# Patient Record
Sex: FEMALE | Race: WHITE | NOT HISPANIC OR LATINO | Employment: FULL TIME | URBAN - NONMETROPOLITAN AREA
[De-identification: names, ages, dates, MRNs, and addresses within clinical notes are randomized per-mention and may not be internally consistent; named-entity substitution may affect disease eponyms.]

---

## 2017-04-07 ENCOUNTER — OFFICE VISIT (OUTPATIENT)
Dept: FAMILY MEDICINE | Facility: OTHER | Age: 32
End: 2017-04-07
Attending: FAMILY MEDICINE
Payer: COMMERCIAL

## 2017-04-07 VITALS
BODY MASS INDEX: 23.64 KG/M2 | HEART RATE: 66 BPM | HEIGHT: 68 IN | TEMPERATURE: 96.3 F | SYSTOLIC BLOOD PRESSURE: 121 MMHG | DIASTOLIC BLOOD PRESSURE: 70 MMHG | OXYGEN SATURATION: 99 % | RESPIRATION RATE: 17 BRPM | WEIGHT: 156 LBS

## 2017-04-07 DIAGNOSIS — N92.6 IRREGULAR MENSTRUAL CYCLE: ICD-10-CM

## 2017-04-07 DIAGNOSIS — Z23 NEED FOR VACCINATION: Primary | ICD-10-CM

## 2017-04-07 PROCEDURE — 90715 TDAP VACCINE 7 YRS/> IM: CPT | Performed by: FAMILY MEDICINE

## 2017-04-07 PROCEDURE — 99202 OFFICE O/P NEW SF 15 MIN: CPT | Mod: 25 | Performed by: FAMILY MEDICINE

## 2017-04-07 PROCEDURE — 90471 IMMUNIZATION ADMIN: CPT | Performed by: FAMILY MEDICINE

## 2017-04-07 ASSESSMENT — ANXIETY QUESTIONNAIRES
2. NOT BEING ABLE TO STOP OR CONTROL WORRYING: NOT AT ALL
6. BECOMING EASILY ANNOYED OR IRRITABLE: NOT AT ALL
5. BEING SO RESTLESS THAT IT IS HARD TO SIT STILL: NOT AT ALL
1. FEELING NERVOUS, ANXIOUS, OR ON EDGE: NOT AT ALL
3. WORRYING TOO MUCH ABOUT DIFFERENT THINGS: NOT AT ALL
GAD7 TOTAL SCORE: 0
7. FEELING AFRAID AS IF SOMETHING AWFUL MIGHT HAPPEN: NOT AT ALL
IF YOU CHECKED OFF ANY PROBLEMS ON THIS QUESTIONNAIRE, HOW DIFFICULT HAVE THESE PROBLEMS MADE IT FOR YOU TO DO YOUR WORK, TAKE CARE OF THINGS AT HOME, OR GET ALONG WITH OTHER PEOPLE: NOT DIFFICULT AT ALL

## 2017-04-07 ASSESSMENT — PAIN SCALES - GENERAL: PAINLEVEL: NO PAIN (0)

## 2017-04-07 ASSESSMENT — PATIENT HEALTH QUESTIONNAIRE - PHQ9: 5. POOR APPETITE OR OVEREATING: NOT AT ALL

## 2017-04-07 NOTE — PROGRESS NOTES
"  SUBJECTIVE:                                                    Viki Busch is a 31 year old female who presents to clinic today for the following health issues:        Vaginal Bleeding (Dysmenorrhea)      Onset: tracking x 1 year, worse last 3 years, after getting off birth control     Description:  Duration of bleeding episodes: 5-6 days of spotting leading up to cycle, 1 week   Frequency between periods:  27-28 cycle   Describe bleeding/flow:   Clots: YES-   Number of pads/hour: 2, for first 2 days, and lightens after that   Cramping: during- first 2 days     Intensity:  mild    Accompanying signs and symptoms:     History (similar episodes/previous evaluation): None    Precipitating or alleviating factors: None    Therapies tried and outcome: None       Problem list and histories reviewed & adjusted, as indicated.  Additional history: as documented    There is no problem list on file for this patient.    Past Surgical History:   Procedure Laterality Date     HC TOOTH EXTRACTION W/FORCEP Bilateral 06/01/2016       Social History   Substance Use Topics     Smoking status: Former Smoker     Packs/day: 0.20     Years: 6.00     Types: Cigarettes     Smokeless tobacco: Not on file      Comment: quit 2010     Alcohol use Yes      Comment: 2-3x/month     Family History   Problem Relation Age of Onset     Thyroid Cancer Mother      Hyperlipidemia Father      Other - See Comments Maternal Grandmother      depression     Other - See Comments Maternal Grandfather      old age     Other - See Comments Paternal Grandmother      old age     Other - See Comments Paternal Grandfather      old age         No current outpatient prescriptions on file.     No Known Allergies    ROS:  Constitutional, HEENT, cardiovascular, pulmonary, gi and gu systems are negative, except as otherwise noted.    OBJECTIVE:                                                    /70  Pulse 66  Temp 96.3  F (35.7  C)  Resp 17  Ht 5' 8\" (1.727 " m)  Wt 156 lb (70.8 kg)  LMP 04/07/2017 (Exact Date)  SpO2 99%  Breastfeeding? No  BMI 23.72 kg/m2  Body mass index is 23.72 kg/(m^2).  GENERAL APPEARANCE: healthy, alert and no distress  RESP: lungs clear to auscultation - no rales, rhonchi or wheezes  CV: regular rates and rhythm, normal S1 S2, no S3 or S4 and no murmur, click or rub  SKIN: acne  PSYCH: mentation appears normal and affect normal/bright       ASSESSMENT/PLAN:                                                    1. Need for vaccination    - 1st  Administration  [76857]  - TDAP VACCINE (ADACEL) [79548.002]    2. Irregular menstrual cycle  F/u 10 days for results and will likely schedule THP  - Lutropin; Future  - Follicle stimulating hormone; Future  - Testosterone Free and Total; Future  - DHEA sulfate; Future  - Androstenedione; Future  - Prolactin; Future  - TSH with free T4 reflex; Future  - Insulin level; Future  - US Pelvis Complete without Transvaginal; Future  - US Transvaginal Non OB; Future  - US Pelvis Complete without Transvaginal  - US Transvaginal Non OB    3.  Establish care -- will obtain records from [facility] for gerardo crawford    Patient was agreeable to this plan and had no further questions.  See Patient Instructions    Ludy Reza MD  [facility]

## 2017-04-07 NOTE — NURSING NOTE
"Chief Complaint   Patient presents with     Establish Care     Abnormal Bleeding Problem       Initial /70  Pulse 66  Temp 96.3  F (35.7  C)  Resp 17  Ht 5' 8\" (1.727 m)  Wt 156 lb (70.8 kg)  LMP 04/07/2017 (Exact Date)  SpO2 99%  Breastfeeding? No  BMI 23.72 kg/m2 Estimated body mass index is 23.72 kg/(m^2) as calculated from the following:    Height as of this encounter: 5' 8\" (1.727 m).    Weight as of this encounter: 156 lb (70.8 kg).  Medication Reconciliation: complete  "

## 2017-04-07 NOTE — MR AVS SNAPSHOT
After Visit Summary   4/7/2017    Viki Busch    MRN: [id number]           Patient Information     Date Of Birth          1985        Visit Information        Provider Department      4/7/2017 10:30 AM Ludy Re[facility] MD [facility]        Today's Diagnoses     Need for vaccination    -  1    Irregular menstrual cycle          Care Instructions    1.  optivite PMT  1 tablet 2x/day  2.  Fish oil 2000mg daily  Nordic naturals is most pure  3.  Probiotic or kefir daily  4oz/day  Or kombucha        Follow-ups after your visit        Your next 10 appointments already scheduled     Apr 19, 2017 11:00 AM CDT   (Arrive by 10:45 AM)   SHORT with Ludy Reza MD   [facility] [place] ([facility] [place] [facility])    [address]  [place] [address]   [phone]              Future tests that were ordered for you today     Open Future Orders        Priority Expected Expires Ordered    Lutropin Routine 4/9/2017 4/6/2018 4/7/2017    Follicle stimulating hormone Routine 4/9/2017 4/6/2018 4/7/2017    Testosterone Free and Total Routine 4/9/2017 4/6/2018 4/7/2017    DHEA sulfate Routine 4/9/2017 4/6/2018 4/7/2017    Androstenedione Routine 4/9/2017 4/6/2018 4/7/2017    Prolactin Routine 4/9/2017 4/6/2018 4/7/2017    TSH with free T4 reflex Routine 4/9/2017 4/6/2018 4/7/2017    Insulin level Routine 4/9/2017 4/6/2018 4/7/2017            Who to contact     If you have questions or need follow up information about today's clinic visit or your schedule please contact [facility] directly at [phone].  Normal or non-critical lab and imaging results will be communicated to you by MyChart, letter or phone within 4 business days after the clinic has received the results. If you do not hear from us within 7 days, please contact the clinic through MyChart or phone. If you have a critical or abnormal lab result, we will notify you by phone as soon as possible.  Submit refill  "requests through GroupStream or call your pharmacy and they will forward the refill request to us. Please allow 3 business days for your refill to be completed.          Additional Information About Your Visit        oragenicshart Information     GroupStream gives you secure access to your electronic health record. If you see a primary care provider, you can also send messages to your care team and make appointments. If you have questions, please call your primary care clinic.  If you do not have a primary care provider, please call [phone] and they will assist you.        Care EveryWhere ID     This is your Care EveryWhere ID. This could be used by other organizations to access your [place] medical records  [id number]        Your Vitals Were     Pulse Temperature Respirations Height Last Period Pulse Oximetry    66 96.3  F (35.7  C) 17 5' 8\" (1.727 m) 04/07/2017 (Exact Date) 99%    Breastfeeding? BMI (Body Mass Index)                No 23.72 kg/m2           Blood Pressure from Last 3 Encounters:   04/07/17 121/70    Weight from Last 3 Encounters:   04/07/17 156 lb (70.8 kg)              We Performed the Following     1st  Administration  [98576]     TDAP VACCINE (ADACEL) [16004.002]     US Pelvis Complete without Transvaginal     US Transvaginal Non OB        Primary Care Provider    None Specified       No primary provider on file.        Thank you!     [facility]  [facility]. Our goal is always to provide you with excellent care. Hearing back from our patients is one way we can continue to improve our services. Please take a few minutes to complete the written survey that you may receive in the mail after your visit with us. Thank you!             Your Updated Medication List - Protect others around you: Learn how to safely use, store and throw away your medicines at www.disposemymeds.org.      Notice  As of 4/7/2017 11:16 AM    You have not been prescribed any medications.      "

## 2017-04-07 NOTE — PATIENT INSTRUCTIONS
1.  optivite PMT  1 tablet 2x/day  2.  Fish oil 2000mg daily  Nordic naturals is most pure  3.  Probiotic or kefir daily  4oz/day  Or kombucha

## 2017-04-08 ASSESSMENT — PATIENT HEALTH QUESTIONNAIRE - PHQ9: SUM OF ALL RESPONSES TO PHQ QUESTIONS 1-9: 3

## 2017-04-08 ASSESSMENT — ANXIETY QUESTIONNAIRES: GAD7 TOTAL SCORE: 0

## 2017-04-09 DIAGNOSIS — N92.6 IRREGULAR MENSTRUAL CYCLE: ICD-10-CM

## 2017-04-09 LAB — TSH SERPL DL<=0.005 MIU/L-ACNC: 3.61 MU/L (ref 0.4–4)

## 2017-04-09 PROCEDURE — 82627 DEHYDROEPIANDROSTERONE: CPT | Performed by: FAMILY MEDICINE

## 2017-04-09 PROCEDURE — 84443 ASSAY THYROID STIM HORMONE: CPT | Performed by: FAMILY MEDICINE

## 2017-04-09 PROCEDURE — 84270 ASSAY OF SEX HORMONE GLOBUL: CPT | Performed by: FAMILY MEDICINE

## 2017-04-09 PROCEDURE — 83002 ASSAY OF GONADOTROPIN (LH): CPT | Performed by: FAMILY MEDICINE

## 2017-04-09 PROCEDURE — 82157 ASSAY OF ANDROSTENEDIONE: CPT | Performed by: FAMILY MEDICINE

## 2017-04-09 PROCEDURE — 83525 ASSAY OF INSULIN: CPT | Performed by: FAMILY MEDICINE

## 2017-04-09 PROCEDURE — 83001 ASSAY OF GONADOTROPIN (FSH): CPT | Performed by: FAMILY MEDICINE

## 2017-04-09 PROCEDURE — 36415 COLL VENOUS BLD VENIPUNCTURE: CPT | Performed by: FAMILY MEDICINE

## 2017-04-09 PROCEDURE — 84403 ASSAY OF TOTAL TESTOSTERONE: CPT | Performed by: FAMILY MEDICINE

## 2017-04-09 PROCEDURE — 84146 ASSAY OF PROLACTIN: CPT | Performed by: FAMILY MEDICINE

## 2017-04-10 LAB
FSH SERPL-ACNC: 4.5 IU/L
INSULIN SERPL-ACNC: 10.7 MU/L (ref 3–25)
LH SERPL-ACNC: 8 IU/L
PROLACTIN SERPL-MCNC: 18 UG/L (ref 3–27)

## 2017-04-11 LAB — DHEA-S SERPL-MCNC: 83 UG/DL (ref 35–430)

## 2017-04-13 LAB — ANDROST SERPL-MCNC: 0.91 NG/ML

## 2017-04-14 LAB
SHBG SERPL-SCNC: 117 NMOL/L (ref 30–135)
TESTOST FREE SERPL-MCNC: 0.21 NG/DL (ref 0.13–0.92)
TESTOST SERPL-MCNC: 34 NG/DL (ref 8–60)

## 2017-04-17 ENCOUNTER — HOSPITAL ENCOUNTER (OUTPATIENT)
Dept: ULTRASOUND IMAGING | Facility: HOSPITAL | Age: 32
Discharge: HOME | End: 2017-04-17
Attending: FAMILY MEDICINE | Admitting: FAMILY MEDICINE
Payer: COMMERCIAL

## 2017-04-17 PROCEDURE — 76856 US EXAM PELVIC COMPLETE: CPT | Mod: TC

## 2017-04-17 PROCEDURE — 76830 TRANSVAGINAL US NON-OB: CPT | Mod: TC

## 2017-04-19 ENCOUNTER — OFFICE VISIT (OUTPATIENT)
Dept: FAMILY MEDICINE | Facility: OTHER | Age: 32
End: 2017-04-19
Attending: FAMILY MEDICINE
Payer: COMMERCIAL

## 2017-04-19 VITALS
HEART RATE: 64 BPM | DIASTOLIC BLOOD PRESSURE: 63 MMHG | WEIGHT: 151 LBS | BODY MASS INDEX: 22.96 KG/M2 | OXYGEN SATURATION: 100 % | TEMPERATURE: 97.3 F | SYSTOLIC BLOOD PRESSURE: 108 MMHG | RESPIRATION RATE: 17 BRPM

## 2017-04-19 DIAGNOSIS — E28.2 PCOS (POLYCYSTIC OVARIAN SYNDROME): Primary | ICD-10-CM

## 2017-04-19 DIAGNOSIS — N94.3 PREMENSTRUAL SYNDROME: ICD-10-CM

## 2017-04-19 PROCEDURE — 99214 OFFICE O/P EST MOD 30 MIN: CPT | Performed by: FAMILY MEDICINE

## 2017-04-19 ASSESSMENT — PAIN SCALES - GENERAL: PAINLEVEL: NO PAIN (0)

## 2017-04-19 NOTE — MR AVS SNAPSHOT
After Visit Summary   4/19/2017    Viki Busch    MRN: [id number]           Patient Information     Date Of Birth          1985        Visit Information        Provider Department      4/19/2017 11:00 AM Ludy Reza MD [facility]        Care Instructions    1.  ashwaghanda  (jessica)  1 capsule   2.  N acetyl cysteine -- 1 tablet (500-600mg)  2x/day        Follow-ups after your visit        Who to contact     If you have questions or need follow up information about today's clinic visit or your schedule please contact [facility] directly at [phone].  Normal or non-critical lab and imaging results will be communicated to you by MyChart, letter or phone within 4 business days after the clinic has received the results. If you do not hear from us within 7 days, please contact the clinic through Rootlesshart or phone. If you have a critical or abnormal lab result, we will notify you by phone as soon as possible.  Submit refill requests through Rentabilities or call your pharmacy and they will forward the refill request to us. Please allow 3 business days for your refill to be completed.          Additional Information About Your Visit        MyChart Information     Rentabilities gives you secure access to your electronic health record. If you see a primary care provider, you can also send messages to your care team and make appointments. If you have questions, please call your primary care clinic.  If you do not have a primary care provider, please call [phone] and they will assist you.        Care EveryWhere ID     This is your Care EveryWhere ID. This could be used by other organizations to access your [place] medical records  [id number]        Your Vitals Were     Pulse Temperature Respirations Last Period Pulse Oximetry BMI (Body Mass Index)    64 97.3  F (36.3  C) 17 04/07/2017 (Exact Date) 100% 22.96 kg/m2       Blood Pressure from Last 3 Encounters:   04/19/17  108/63   04/07/17 121/70    Weight from Last 3 Encounters:   04/19/17 151 lb (68.5 kg)   04/07/17 156 lb (70.8 kg)              Today, you had the following     No orders found for display       Primary Care Provider Office Phone # Fax #    Ludy Reza [phone]       [facility] [place] [address] [place], [place] [address] [address]        Thank you!     Thank you for choosing [facility]  for your care. Our goal is always to provide you with excellent care. Hearing back from our patients is one way we can continue to improve our services. Please take a few minutes to complete the written survey that you may receive in the mail after your visit with us. Thank you!             Your Updated Medication List - Protect others around you: Learn how to safely use, store and throw away your medicines at www.disposemymeds.org.      Notice  As of 4/19/2017 11:30 AM    You have not been prescribed any medications.

## 2017-04-19 NOTE — PROGRESS NOTES
SUBJECTIVE:                                                    Viki Busch is a 31 year old female who presents to clinic today for the following health issues:        dysmenorrhea       Duration: 10 day follow up     Description (location/character/radiation): n/a    Intensity:  mild    Accompanying signs and symptoms:     History (similar episodes/previous evaluation): None    Precipitating or alleviating factors: None    Therapies tried and outcome: None     Has PMS and pre menstrual spotting  Did u/s and labs recently    Problem list and histories reviewed & adjusted, as indicated.  Additional history: as documented    Current Outpatient Prescriptions   Medication Sig Dispense Refill     acetylcysteine (N-ACETYL CYSTEINE) 600 MG CAPS capsule Take 1 capsule (600 mg) by mouth 2 times daily 60 capsule 1     Labs reviewed in EPIC    ROS:  Constitutional, HEENT, cardiovascular, pulmonary, gi and gu systems are negative, except as otherwise noted.    OBJECTIVE:                                                    /63  Pulse 64  Temp 97.3  F (36.3  C)  Resp 17  Wt 151 lb (68.5 kg)  LMP 04/07/2017 (Exact Date)  SpO2 100%  BMI 22.96 kg/m2  Body mass index is 22.96 kg/(m^2).  GENERAL APPEARANCE: healthy, alert and no distress  RESP: lungs clear to auscultation - no rales, rhonchi or wheezes  CV: regular rates and rhythm, normal S1 S2, no S3 or S4 and no murmur, click or rub  PSYCH: mentation appears normal and affect normal/bright       ASSESSMENT/PLAN:                                                    1. PCOS (polycystic ovarian syndrome)  F/u 1 week after THP  Also recommend ashwaanda for sub hypothyroidism  - acetylcysteine (N-ACETYL CYSTEINE) 600 MG CAPS capsule; Take 1 capsule (600 mg) by mouth 2 times daily  Dispense: 60 capsule; Refill: 1    2. Premenstrual syndrome  THP in next 1-2 weeks and f/u 1 week after  May need prometrium supplementation.    Patient was agreeable to this plan and had no  further questions.  See Patient Instructions    Ludy Reza MD  [facility]

## 2017-04-19 NOTE — NURSING NOTE
"Chief Complaint   Patient presents with     Abnormal Bleeding Problem       Initial /63  Pulse 64  Temp 97.3  F (36.3  C)  Resp 17  Wt 151 lb (68.5 kg)  LMP 04/07/2017 (Exact Date)  SpO2 100%  BMI 22.96 kg/m2 Estimated body mass index is 22.96 kg/(m^2) as calculated from the following:    Height as of 4/7/17: 5' 8\" (1.727 m).    Weight as of this encounter: 151 lb (68.5 kg).  Medication Reconciliation: complete  "

## 2017-04-25 ENCOUNTER — TELEPHONE (OUTPATIENT)
Dept: FAMILY MEDICINE | Facility: OTHER | Age: 32
End: 2017-04-25
Payer: COMMERCIAL

## 2017-04-25 ENCOUNTER — APPOINTMENT (OUTPATIENT)
Dept: LAB | Facility: OTHER | Age: 32
End: 2017-04-25
Attending: FAMILY MEDICINE
Payer: COMMERCIAL

## 2017-04-25 DIAGNOSIS — E28.2 PCOS (POLYCYSTIC OVARIAN SYNDROME): Primary | ICD-10-CM

## 2017-04-25 DIAGNOSIS — N94.3 PREMENSTRUAL SYNDROME: ICD-10-CM

## 2017-04-25 LAB — ESTRADIOL SERPL-MCNC: 100 PG/ML

## 2017-04-25 PROCEDURE — 36415 COLL VENOUS BLD VENIPUNCTURE: CPT | Performed by: FAMILY MEDICINE

## 2017-04-25 PROCEDURE — 82670 ASSAY OF TOTAL ESTRADIOL: CPT | Mod: 90 | Performed by: FAMILY MEDICINE

## 2017-04-25 PROCEDURE — 99000 SPECIMEN HANDLING OFFICE-LAB: CPT | Performed by: FAMILY MEDICINE

## 2017-04-25 PROCEDURE — 84144 ASSAY OF PROGESTERONE: CPT | Mod: 90 | Performed by: FAMILY MEDICINE

## 2017-04-26 LAB — PROGEST SERPL-MCNC: 9.8 NG/ML

## 2017-04-27 ENCOUNTER — MYC MEDICAL ADVICE (OUTPATIENT)
Dept: FAMILY MEDICINE | Facility: OTHER | Age: 32
End: 2017-04-27

## 2017-04-27 DIAGNOSIS — E28.2 PCOS (POLYCYSTIC OVARIAN SYNDROME): ICD-10-CM

## 2017-04-27 DIAGNOSIS — N94.3 PREMENSTRUAL SYNDROME: ICD-10-CM

## 2017-04-27 DIAGNOSIS — E28.2 PCOS (POLYCYSTIC OVARIAN SYNDROME): Primary | ICD-10-CM

## 2017-04-27 LAB
ESTRADIOL SERPL-MCNC: 133 PG/ML
PROGEST SERPL-MCNC: 12.7 NG/ML

## 2017-04-27 PROCEDURE — 84144 ASSAY OF PROGESTERONE: CPT | Mod: 90 | Performed by: FAMILY MEDICINE

## 2017-04-27 PROCEDURE — 82670 ASSAY OF TOTAL ESTRADIOL: CPT | Mod: 90 | Performed by: FAMILY MEDICINE

## 2017-04-27 PROCEDURE — 36415 COLL VENOUS BLD VENIPUNCTURE: CPT | Performed by: FAMILY MEDICINE

## 2017-04-27 PROCEDURE — 99000 SPECIMEN HANDLING OFFICE-LAB: CPT | Performed by: FAMILY MEDICINE

## 2017-04-29 DIAGNOSIS — N94.3 PREMENSTRUAL SYNDROME: ICD-10-CM

## 2017-04-29 DIAGNOSIS — E28.2 PCOS (POLYCYSTIC OVARIAN SYNDROME): ICD-10-CM

## 2017-04-29 PROCEDURE — 82670 ASSAY OF TOTAL ESTRADIOL: CPT | Performed by: FAMILY MEDICINE

## 2017-04-29 PROCEDURE — 84144 ASSAY OF PROGESTERONE: CPT | Performed by: FAMILY MEDICINE

## 2017-04-29 PROCEDURE — 36415 COLL VENOUS BLD VENIPUNCTURE: CPT | Performed by: FAMILY MEDICINE

## 2017-05-01 DIAGNOSIS — E28.2 PCOS (POLYCYSTIC OVARIAN SYNDROME): ICD-10-CM

## 2017-05-01 DIAGNOSIS — N94.3 PREMENSTRUAL SYNDROME: ICD-10-CM

## 2017-05-01 LAB
ESTRADIOL SERPL-MCNC: 100 PG/ML
ESTRADIOL SERPL-MCNC: 111 PG/ML
PROGEST SERPL-MCNC: 11.5 NG/ML
PROGEST SERPL-MCNC: 8.4 NG/ML

## 2017-05-01 PROCEDURE — 82670 ASSAY OF TOTAL ESTRADIOL: CPT | Mod: 90 | Performed by: FAMILY MEDICINE

## 2017-05-01 PROCEDURE — 36415 COLL VENOUS BLD VENIPUNCTURE: CPT | Performed by: FAMILY MEDICINE

## 2017-05-01 PROCEDURE — 99000 SPECIMEN HANDLING OFFICE-LAB: CPT | Performed by: FAMILY MEDICINE

## 2017-05-01 PROCEDURE — 84144 ASSAY OF PROGESTERONE: CPT | Mod: 90 | Performed by: FAMILY MEDICINE

## 2017-05-03 DIAGNOSIS — N94.3 PREMENSTRUAL SYNDROME: ICD-10-CM

## 2017-05-03 DIAGNOSIS — E28.2 PCOS (POLYCYSTIC OVARIAN SYNDROME): ICD-10-CM

## 2017-05-03 LAB
ESTRADIOL SERPL-MCNC: 100 PG/ML
PROGEST SERPL-MCNC: 6 NG/ML

## 2017-05-03 PROCEDURE — 82670 ASSAY OF TOTAL ESTRADIOL: CPT | Mod: 90 | Performed by: FAMILY MEDICINE

## 2017-05-03 PROCEDURE — 84144 ASSAY OF PROGESTERONE: CPT | Mod: 90 | Performed by: FAMILY MEDICINE

## 2017-05-03 PROCEDURE — 36415 COLL VENOUS BLD VENIPUNCTURE: CPT | Performed by: FAMILY MEDICINE

## 2017-05-03 PROCEDURE — 99000 SPECIMEN HANDLING OFFICE-LAB: CPT | Performed by: FAMILY MEDICINE

## 2017-05-10 ENCOUNTER — OFFICE VISIT (OUTPATIENT)
Dept: FAMILY MEDICINE | Facility: OTHER | Age: 32
End: 2017-05-10
Attending: FAMILY MEDICINE
Payer: COMMERCIAL

## 2017-05-10 VITALS
HEART RATE: 62 BPM | DIASTOLIC BLOOD PRESSURE: 70 MMHG | RESPIRATION RATE: 17 BRPM | BODY MASS INDEX: 22.81 KG/M2 | TEMPERATURE: 96.3 F | OXYGEN SATURATION: 100 % | WEIGHT: 150 LBS | SYSTOLIC BLOOD PRESSURE: 116 MMHG

## 2017-05-10 DIAGNOSIS — R79.89 LOW SERUM PROGESTERONE: ICD-10-CM

## 2017-05-10 DIAGNOSIS — N94.3 PMS (PREMENSTRUAL SYNDROME): Primary | ICD-10-CM

## 2017-05-10 PROCEDURE — 99213 OFFICE O/P EST LOW 20 MIN: CPT | Performed by: FAMILY MEDICINE

## 2017-05-10 RX ORDER — EVE PRIMROSE/LINOLEIC/G-LENIC 1000 MG
0.5 CAPSULE ORAL 2 TIMES DAILY
COMMUNITY
End: 2017-07-07

## 2017-05-10 RX ORDER — CHLORAL HYDRATE 500 MG
2 CAPSULE ORAL 2 TIMES DAILY
COMMUNITY
End: 2019-08-05

## 2017-05-10 ASSESSMENT — PAIN SCALES - GENERAL: PAINLEVEL: NO PAIN (0)

## 2017-05-10 NOTE — PROGRESS NOTES
SUBJECTIVE:                                                    Viki Busch is a 31 year old female who presents to clinic today for the following health issues:        PCOS       Duration: 3-4 week follow up     Description (location/character/radiation): n/a     Intensity:  mild    Accompanying signs and symptoms:     History (similar episodes/previous evaluation): None    Precipitating or alleviating factors: NAC    Therapies tried and outcome: NAC     Had THP done    Problem list and histories reviewed & adjusted, as indicated.  Additional history: as documented    Current Outpatient Prescriptions   Medication Sig Dispense Refill     Multiple Vitamins-Minerals (OPTIVITE PO) Take 1 tablet by mouth 2 times daily       fish oil-omega-3 fatty acids 1000 MG capsule Take 2 g by mouth 2 times daily       Ashwagandha 125 MG CAPS Take 1 tablet by mouth daily       Evening Primrose Oil 1000 MG CAPS Take 0.5 tablets by mouth 2 times daily       progesterone (PROMETRIUM) 200 MG capsule Take 1 capsule (200 mg) by mouth At Bedtime for 10 days 30 capsule 1     acetylcysteine (N-ACETYL CYSTEINE) 600 MG CAPS capsule Take 1 capsule (600 mg) by mouth 2 times daily 60 capsule 1     Labs reviewed in EPIC    ROS:  Constitutional, HEENT, cardiovascular, pulmonary, gi and gu systems are negative, except as otherwise noted.    OBJECTIVE:                                                    /70  Pulse 62  Temp 96.3  F (35.7  C)  Resp 17  Wt 150 lb (68 kg)  SpO2 100%  BMI 22.81 kg/m2  Body mass index is 22.81 kg/(m^2).  GENERAL APPEARANCE: healthy, alert and no distress  PSYCH: mentation appears normal and affect normal/bright       ASSESSMENT/PLAN:                                                    1. PMS (premenstrual syndrome)  Start prometrium and recheck peak +7 labs in 2 mos  - progesterone (PROMETRIUM) 200 MG capsule; Take 1 capsule (200 mg) by mouth At Bedtime for 10 days  Dispense: 30 capsule; Refill: 1  -  Estradiol; Standing  - Progesterone; Standing    2. Low serum progesterone    - Estradiol; Standing  - Progesterone; Standing    Patient was agreeable to this plan and had no further questions.  See Patient Instructions    Ludy Reza MD  [facility]

## 2017-05-10 NOTE — NURSING NOTE
"No chief complaint on file.      Initial /70  Pulse 62  Temp 96.3  F (35.7  C)  Resp 17  Wt 150 lb (68 kg)  SpO2 100%  BMI 22.81 kg/m2 Estimated body mass index is 22.81 kg/(m^2) as calculated from the following:    Height as of 4/7/17: 5' 8\" (1.727 m).    Weight as of this encounter: 150 lb (68 kg).  Medication Reconciliation: complete  "

## 2017-05-10 NOTE — MR AVS SNAPSHOT
After Visit Summary   5/10/2017    Viki Busch    MRN: [id number]           Patient Information     Date Of Birth          1985        Visit Information        Provider Department      5/10/2017 9:45 AM [facility]        Today's Diagnoses     PMS (premenstrual syndrome)    -  1    Low serum progesterone           Follow-ups after your visit        Future tests that were ordered for you today     Open Standing Orders        Priority Remaining Interval Expires Ordered    Estradiol Routine 5/5  5/9/2018 5/10/2017    Progesterone Routine 5/5  5/9/2018 5/10/2017            Who to contact     If you have questions or need follow up information about today's clinic visit or your schedule please [facility] at [phone].  Normal or non-critical lab and imaging results will be communicated to you by MyChart, letter or phone within 4 business days after the clinic has received the results. If you do not hear from us within 7 days, please contact the clinic through MyChart or phone. If you have a critical or abnormal lab result, we will notify you by phone as soon as possible.  Submit refill requests through Exie or call your pharmacy and they will forward the refill request to us. Please allow 3 business days for your refill to be completed.          Additional Information About Your Visit        MyChart Information     Exie gives you secure access to your electronic health record. If you see a primary care provider, you can also send messages to your care team and make appointments. If you have questions, please call your primary care clinic.  If you do not have a primary care provider, please call [phone] and they will assist you.        Care EveryWhere ID     This is your Care EveryWhere ID. This could be used by other organizations to access your [place] medical records  [id number]        Your Vitals Were     Pulse  Temperature Respirations Pulse Oximetry BMI (Body Mass Index)       62 96.3  F (35.7  C) 17 100% 22.81 kg/m2        Blood Pressure from Last 3 Encounters:   05/10/17 116/70   04/19/17 108/63   04/07/17 121/70    Weight from Last 3 Encounters:   05/10/17 150 lb (68 kg)   04/19/17 151 lb (68.5 kg)   04/07/17 156 lb (70.8 kg)                 Today's Medication Changes          These changes are accurate as of: 5/10/17 10:36 AM.  If you have any questions, ask your nurse or doctor.               Start taking these medicines.        Dose/Directions    progesterone 200 MG capsule   Commonly known as:  PROMETRIUM   Used for:  PMS (premenstrual syndrome)   Started by:  Ludy Reza MD        Dose:  200 mg   Take 1 capsule (200 mg) by mouth At Bedtime for 10 days   Quantity:  30 capsule   Refills:  1            Where to get your medicines      These medications were sent to [facility] [address] [place] [address] [place] - [address] AT [facility] [address], [place] [address]     Phone:  [phone]     progesterone 200 MG capsule                Primary Care Provider Office Phone # Fax #    Ludy Reza [phone]       [facility] [place] [address]  [place] [address]        Thank you!     Thank you for choosing [facility]  for your care. Our goal is always to provide you with excellent care. Hearing back from our patients is one way we can continue to improve our services. Please take a few minutes to complete the written survey that you may receive in the mail after your visit with us. Thank you!             Your Updated Medication List - Protect others around you: Learn how to safely use, store and throw away your medicines at www.disposemymeds.org.          This list is accurate as of: 5/10/17 10:36 AM.  Always use your most recent med list.                   Brand Name Dispense Instructions for use    acetylcysteine 600 MG Caps capsule    N-ACETYL  CYSTEINE    60 capsule    Take 1 capsule (600 mg) by mouth 2 times daily       Ashwagandha 125 MG Caps      Take 1 tablet by mouth daily       Evening Primrose Oil 1000 MG Caps      Take 0.5 tablets by mouth 2 times daily       fish oil-omega-3 fatty acids 1000 MG capsule      Take 2 g by mouth 2 times daily       OPTIVITE PO      Take 1 tablet by mouth 2 times daily       progesterone 200 MG capsule    PROMETRIUM    30 capsule    Take 1 capsule (200 mg) by mouth At Bedtime for 10 days

## 2017-06-03 ENCOUNTER — MYC MEDICAL ADVICE (OUTPATIENT)
Dept: FAMILY MEDICINE | Facility: OTHER | Age: 32
End: 2017-06-03

## 2017-06-29 ENCOUNTER — TELEPHONE (OUTPATIENT)
Dept: FAMILY MEDICINE | Facility: OTHER | Age: 32
End: 2017-06-29

## 2017-06-29 NOTE — TELEPHONE ENCOUNTER
10:30 AM    Reason for Call: Phone Call    Description: Patient is supposed to come in to do labs but it would have to be on 7-1-17, so should she go to the hospital lab or just wait until next month? If you could call back at your earliest convenience [phone]    Was an appointment offered for this call? No    Preferred method for responding to this message: Telephone Call    If we cannot reach you directly, may we leave a detailed response at the number you provided? Yes    Can this message wait until your PCP/provider returns, if available today? YES    Sumi Luna

## 2017-07-03 DIAGNOSIS — R79.89 LOW SERUM PROGESTERONE: ICD-10-CM

## 2017-07-03 DIAGNOSIS — N94.3 PMS (PREMENSTRUAL SYNDROME): ICD-10-CM

## 2017-07-03 LAB
ESTRADIOL SERPL-MCNC: 125 PG/ML
PROGEST SERPL-MCNC: 26.2 NG/ML

## 2017-07-03 PROCEDURE — 99000 SPECIMEN HANDLING OFFICE-LAB: CPT | Performed by: FAMILY MEDICINE

## 2017-07-03 PROCEDURE — 36415 COLL VENOUS BLD VENIPUNCTURE: CPT | Performed by: FAMILY MEDICINE

## 2017-07-03 PROCEDURE — 84144 ASSAY OF PROGESTERONE: CPT | Mod: 90 | Performed by: FAMILY MEDICINE

## 2017-07-03 PROCEDURE — 82670 ASSAY OF TOTAL ESTRADIOL: CPT | Mod: 90 | Performed by: FAMILY MEDICINE

## 2017-07-07 ENCOUNTER — OFFICE VISIT (OUTPATIENT)
Dept: FAMILY MEDICINE | Facility: OTHER | Age: 32
End: 2017-07-07
Attending: FAMILY MEDICINE
Payer: COMMERCIAL

## 2017-07-07 VITALS
RESPIRATION RATE: 16 BRPM | HEIGHT: 68 IN | WEIGHT: 154 LBS | DIASTOLIC BLOOD PRESSURE: 82 MMHG | SYSTOLIC BLOOD PRESSURE: 114 MMHG | OXYGEN SATURATION: 96 % | HEART RATE: 67 BPM | TEMPERATURE: 98.6 F | BODY MASS INDEX: 23.34 KG/M2

## 2017-07-07 DIAGNOSIS — N91.2 AMENORRHEA: ICD-10-CM

## 2017-07-07 DIAGNOSIS — N91.2 AMENORRHEA: Primary | ICD-10-CM

## 2017-07-07 DIAGNOSIS — R79.89 LOW SERUM PROGESTERONE: ICD-10-CM

## 2017-07-07 DIAGNOSIS — O26.90: ICD-10-CM

## 2017-07-07 DIAGNOSIS — Z34.80 SUPERVISION OF OTHER NORMAL PREGNANCY, ANTEPARTUM: ICD-10-CM

## 2017-07-07 LAB
HCG UR QL: POSITIVE
PROGEST SERPL-MCNC: 31.9 NG/ML

## 2017-07-07 PROCEDURE — 99000 SPECIMEN HANDLING OFFICE-LAB: CPT | Performed by: FAMILY MEDICINE

## 2017-07-07 PROCEDURE — 81025 URINE PREGNANCY TEST: CPT | Performed by: FAMILY MEDICINE

## 2017-07-07 PROCEDURE — 36415 COLL VENOUS BLD VENIPUNCTURE: CPT | Performed by: FAMILY MEDICINE

## 2017-07-07 PROCEDURE — 99213 OFFICE O/P EST LOW 20 MIN: CPT | Performed by: FAMILY MEDICINE

## 2017-07-07 PROCEDURE — 84144 ASSAY OF PROGESTERONE: CPT | Mod: 90 | Performed by: FAMILY MEDICINE

## 2017-07-07 ASSESSMENT — ANXIETY QUESTIONNAIRES
2. NOT BEING ABLE TO STOP OR CONTROL WORRYING: NOT AT ALL
GAD7 TOTAL SCORE: 1
4. TROUBLE RELAXING: NOT AT ALL
IF YOU CHECKED OFF ANY PROBLEMS ON THIS QUESTIONNAIRE, HOW DIFFICULT HAVE THESE PROBLEMS MADE IT FOR YOU TO DO YOUR WORK, TAKE CARE OF THINGS AT HOME, OR GET ALONG WITH OTHER PEOPLE: NOT DIFFICULT AT ALL
3. WORRYING TOO MUCH ABOUT DIFFERENT THINGS: NOT AT ALL
5. BEING SO RESTLESS THAT IT IS HARD TO SIT STILL: NOT AT ALL
7. FEELING AFRAID AS IF SOMETHING AWFUL MIGHT HAPPEN: NOT AT ALL
1. FEELING NERVOUS, ANXIOUS, OR ON EDGE: NOT AT ALL
6. BECOMING EASILY ANNOYED OR IRRITABLE: SEVERAL DAYS

## 2017-07-07 ASSESSMENT — PAIN SCALES - GENERAL: PAINLEVEL: NO PAIN (0)

## 2017-07-07 NOTE — NURSING NOTE
"Chief Complaint   Patient presents with     Prenatal Care     amenorrhea       Initial /82 (BP Location: Left arm, Patient Position: Sitting, Cuff Size: Adult Regular)  Pulse 67  Temp 98.6  F (37  C) (Tympanic)  Resp 16  Ht 5' 8\" (1.727 m)  Wt 154 lb (69.9 kg)  SpO2 96%  BMI 23.42 kg/m2 Estimated body mass index is 23.42 kg/(m^2) as calculated from the following:    Height as of this encounter: 5' 8\" (1.727 m).    Weight as of this encounter: 154 lb (69.9 kg).  Medication Reconciliation: complete   Effie Hendrickson      "

## 2017-07-07 NOTE — MR AVS SNAPSHOT
After Visit Summary   7/7/2017    Viki Busch    MRN: [id number]           Patient Information     Date Of Birth          1985        Visit Information        Provider Department      7/7/2017 9:30 AM Ludy Reza MD [facility]        Today's Diagnoses     Amenorrhea    -  1    Complication of pregnancy, unspecified trimester        Supervision of other normal pregnancy, antepartum        Low serum progesterone           Follow-ups after your visit        Your next 10 appointments already scheduled     Jul 19, 2017  9:45 AM CDT   (Arrive by 9:30 AM)   SHORT with Ludy Reza MD   [facility] ([facility] )    [address] [address]   [phone]            Jul 31, 2017  8:00 AM CDT   Radiology with [facility] ULTRASOUND 2   [facility] ([facility] )    [address] [facility] [address]   [phone]              Future tests that were ordered for you today     Open Standing Orders        Priority Remaining Interval Expires Ordered    Progesterone Routine 10/10  7/7/2018 7/7/2017            Who to contact     If you have questions or need follow up information about today's clinic visit or your schedule please contact [facility] directly at [phone].  Normal or non-critical lab and imaging results will be communicated to you by MyChart, letter or phone within 4 business days after the clinic has received the results. If you do not hear from us within 7 days, please contact the clinic through MyChart or phone. If you have a critical or abnormal lab result, we will notify you by phone as soon as possible.  Submit refill requests through [employer] or call your pharmacy and they will forward the refill request to us. Please allow 3 business days for your refill to be completed.          Additional Information About Your Visit        KydaemosharASCENDANT MDX Information     [employer] gives you secure access to  "your electronic health record. If you see a primary care provider, you can also send messages to your care team and make appointments. If you have questions, please call your primary care clinic.  If you do not have a primary care provider, please call [phone] and they will assist you.        Care EveryWhere ID     This is your Care EveryWhere ID. This could be used by other organizations to access your [place] medical records  [id number]        Your Vitals Were     Pulse Temperature Respirations Height Pulse Oximetry BMI (Body Mass Index)    67 98.6  F (37  C) (Tympanic) 16 5' 8\" (1.727 m) 96% 23.42 kg/m2       Blood Pressure from Last 3 Encounters:   07/07/17 114/82   05/10/17 116/70   04/19/17 108/63    Weight from Last 3 Encounters:   07/07/17 154 lb (69.9 kg)   05/10/17 150 lb (68 kg)   04/19/17 151 lb (68.5 kg)              We Performed the Following     US OB < 14 Weeks Single     US OB Transvaginal Only          Today's Medication Changes          These changes are accurate as of: 7/7/17  3:28 PM.  If you have any questions, ask your nurse or doctor.               Start taking these medicines.        Dose/Directions    PRENATAL FORMULA 27-1 MG Tabs   Used for:  Supervision of other normal pregnancy, antepartum   Started by:  Ludy Reza MD        Take 1 tablet daily   Quantity:  90 tablet   Refills:  3         These medicines have changed or have updated prescriptions.        Dose/Directions    progesterone 100 MG capsule   Commonly known as:  PROMETRIUM   This may have changed:    - how much to take  - when to take this  - additional instructions   Used for:  Supervision of other normal pregnancy, antepartum, Low serum progesterone   Changed by:  Ludy Reza MD        Dose:  100 mg   Take 1 capsule (100 mg) by mouth At Bedtime And take 1 capsule vaginally at bedtime   Quantity:  60 capsule   Refills:  1            Where to get your medicines      These medications were sent to " [id number] - [place], [place] - [address] AT [facility] [address]  [address], [place] [address]     Phone:  [phone]     PRENATAL FORMULA 27-1 MG Tabs    progesterone 100 MG capsule                Primary Care Provider Office Phone # Fax #    Ludy CONCEPCION Reza [phone]       [facility] [place] [address]  [place] [address]        Equal Access to Services     LORRAINE SELF AH: Hadii aad ku hadasho Soomaali, waaxda luqadaha, qaybta kaalmada adeegyada, waxay idiin hayaan adeeg kharash chauncey . So [facility] [phone].    ATENCIÓN: Si habla español, tiene a guerrero disposición servicios gratuitos de asistencia lingüística. [facility] [phone].    We comply with applicable federal civil rights laws and [place] laws. We do not discriminate on the basis of race, color, national origin, age, disability sex, sexual orientation or gender identity.            Thank you!     Thank you for choosing [facility]  for your care. Our goal is always to provide you with excellent care. Hearing back from our patients is one way we can continue to improve our services. Please take a few minutes to complete the written survey that you may receive in the mail after your visit with us. Thank you!             Your Updated Medication List - Protect others around you: Learn how to safely use, store and throw away your medicines at www.disposemymeds.org.          This list is accurate as of: 7/7/17  3:28 PM.  Always use your most recent med list.                   Brand Name Dispense Instructions for use Diagnosis    acetylcysteine 600 MG Caps capsule    N-ACETYL CYSTEINE    60 capsule    Take 1 capsule (600 mg) by mouth 2 times daily    PCOS (polycystic ovarian syndrome)       fish oil-omega-3 fatty acids 1000 MG capsule      Take 2 g by mouth 2 times daily        PRENATAL FORMULA 27-1 MG Tabs     90 tablet    Take 1 tablet daily    Supervision of other normal pregnancy,  antepartum       progesterone 100 MG capsule    PROMETRIUM    60 capsule    Take 1 capsule (100 mg) by mouth At Bedtime And take 1 capsule vaginally at bedtime    Supervision of other normal pregnancy, antepartum, Low serum progesterone

## 2017-07-07 NOTE — PROGRESS NOTES
"  SUBJECTIVE:                                                    Viki Busch is a 32 year old female who presents to clinic today for the following health issues:      Amenorrhea      Duration: 2 days    Description (location/character/radiation): 2 positive pregnancy tests at home    Intensity:      Accompanying signs and symptoms: slightly nauseous    History (similar episodes/previous evaluation): previous 2 pregnancies    Precipitating or alleviating factors:     Therapies tried and outcome:      Problem list and histories reviewed & adjusted, as indicated.  Additional history: as documented    Current Outpatient Prescriptions   Medication Sig Dispense Refill     progesterone (PROMETRIUM) 100 MG capsule Take 1 capsule (100 mg) by mouth At Bedtime And take 1 capsule vaginally at bedtime 60 capsule 1     Prenatal Vit-Fe Fumarate-FA (PRENATAL FORMULA) 27-1 MG TABS Take 1 tablet daily 90 tablet 3     fish oil-omega-3 fatty acids 1000 MG capsule Take 2 g by mouth 2 times daily       acetylcysteine (N-ACETYL CYSTEINE) 600 MG CAPS capsule Take 1 capsule (600 mg) by mouth 2 times daily 60 capsule 1     Labs reviewed in EPIC    Reviewed and updated as needed this visit by clinical staff  Tobacco  Allergies  Meds  Med Hx  Surg Hx  Fam Hx  Soc Hx      Reviewed and updated as needed this visit by Provider         ROS:  Constitutional, HEENT, cardiovascular, pulmonary, gi and gu systems are negative, except as otherwise noted.    OBJECTIVE:                                                    /82 (BP Location: Left arm, Patient Position: Sitting, Cuff Size: Adult Regular)  Pulse 67  Temp 98.6  F (37  C) (Tympanic)  Resp 16  Ht 5' 8\" (1.727 m)  Wt 154 lb (69.9 kg)  SpO2 96%  BMI 23.42 kg/m2  Body mass index is 23.42 kg/(m^2).  GENERAL APPEARANCE: healthy, alert and no distress  RESP: lungs clear to auscultation - no rales, rhonchi or wheezes  CV: regular rates and rhythm, normal S1 S2, no S3 or S4 and no " murmur, click or rub  PSYCH: mentation appears normal and affect normal/bright       ASSESSMENT/PLAN:                                                    1. Amenorrhea    - HCG qualitative urine; Future    2. Complication of pregnancy, unspecified trimester    - Progesterone; Future    3. Supervision of other normal pregnancy, antepartum  approx 4 w 3d  LUCINDA 3/13/17  - progesterone (PROMETRIUM) 100 MG capsule; Take 1 capsule (100 mg) by mouth At Bedtime And take 1 capsule vaginally at bedtime  Dispense: 60 capsule; Refill: 1  - Prenatal Vit-Fe Fumarate-FA (PRENATAL FORMULA) 27-1 MG TABS; Take 1 tablet daily  Dispense: 90 tablet; Refill: 3  - US OB < 14 Weeks Single  - US OB Transvaginal Only    4. Low serum progesterone  Repeat labs one month  - progesterone (PROMETRIUM) 100 MG capsule; Take 1 capsule (100 mg) by mouth At Bedtime And take 1 capsule vaginally at bedtime  Dispense: 60 capsule; Refill: 1    Patient was agreeable to this plan and had no further questions.  See Patient Instructions    Ludy Reza MD  [facility]

## 2017-07-08 ASSESSMENT — PATIENT HEALTH QUESTIONNAIRE - PHQ9: SUM OF ALL RESPONSES TO PHQ QUESTIONS 1-9: 7

## 2017-07-08 ASSESSMENT — ANXIETY QUESTIONNAIRES: GAD7 TOTAL SCORE: 1

## 2017-07-31 ENCOUNTER — HOSPITAL ENCOUNTER (OUTPATIENT)
Dept: ULTRASOUND IMAGING | Facility: HOSPITAL | Age: 32
Discharge: HOME | End: 2017-07-31
Attending: FAMILY MEDICINE | Admitting: FAMILY MEDICINE
Payer: COMMERCIAL

## 2017-07-31 PROCEDURE — 76817 TRANSVAGINAL US OBSTETRIC: CPT | Mod: TC

## 2017-07-31 PROCEDURE — 76801 OB US < 14 WKS SINGLE FETUS: CPT | Mod: TC

## 2017-08-04 DIAGNOSIS — Z34.80 ENCOUNTER FOR SUPERVISION OF OTHER NORMAL PREGNANCY: Primary | ICD-10-CM

## 2017-08-07 DIAGNOSIS — Z34.80 ENCOUNTER FOR SUPERVISION OF OTHER NORMAL PREGNANCY: ICD-10-CM

## 2017-08-07 LAB
ABO + RH BLD: NORMAL
ABO + RH BLD: NORMAL
ALBUMIN UR-MCNC: 10 MG/DL
APPEARANCE UR: CLEAR
BILIRUB UR QL STRIP: NEGATIVE
BLD GP AB SCN SERPL QL: NORMAL
BLOOD BANK CMNT PATIENT-IMP: NORMAL
COLOR UR AUTO: YELLOW
ERYTHROCYTE [DISTWIDTH] IN BLOOD BY AUTOMATED COUNT: 12.1 % (ref 10–15)
GLUCOSE UR STRIP-MCNC: NEGATIVE MG/DL
HBV SURFACE AG SERPL QL IA: NONREACTIVE
HCT VFR BLD AUTO: 40.3 % (ref 35–47)
HGB BLD-MCNC: 14.4 G/DL (ref 11.7–15.7)
HGB UR QL STRIP: NEGATIVE
HIV 1+2 AB+HIV1 P24 AG SERPL QL IA: NORMAL
KETONES UR STRIP-MCNC: NEGATIVE MG/DL
LEUKOCYTE ESTERASE UR QL STRIP: NEGATIVE
MCH RBC QN AUTO: 30.9 PG (ref 26.5–33)
MCHC RBC AUTO-ENTMCNC: 35.7 G/DL (ref 31.5–36.5)
MCV RBC AUTO: 87 FL (ref 78–100)
NITRATE UR QL: NEGATIVE
PH UR STRIP: 5.5 PH (ref 4.7–8)
PLATELET # BLD AUTO: 226 10E9/L (ref 150–450)
RBC # BLD AUTO: 4.66 10E12/L (ref 3.8–5.2)
SP GR UR STRIP: 1.02 (ref 1–1.03)
SPECIMEN EXP DATE BLD: NORMAL
TSH SERPL DL<=0.005 MIU/L-ACNC: 2.23 MU/L (ref 0.4–4)
URN SPEC COLLECT METH UR: ABNORMAL
UROBILINOGEN UR STRIP-MCNC: NORMAL MG/DL (ref 0–2)
WBC # BLD AUTO: 5.5 10E9/L (ref 4–11)

## 2017-08-07 PROCEDURE — 86850 RBC ANTIBODY SCREEN: CPT | Performed by: FAMILY MEDICINE

## 2017-08-07 PROCEDURE — 86780 TREPONEMA PALLIDUM: CPT | Mod: 90 | Performed by: FAMILY MEDICINE

## 2017-08-07 PROCEDURE — 84443 ASSAY THYROID STIM HORMONE: CPT | Performed by: FAMILY MEDICINE

## 2017-08-07 PROCEDURE — 85027 COMPLETE CBC AUTOMATED: CPT | Performed by: FAMILY MEDICINE

## 2017-08-07 PROCEDURE — 99000 SPECIMEN HANDLING OFFICE-LAB: CPT | Performed by: FAMILY MEDICINE

## 2017-08-07 PROCEDURE — 86900 BLOOD TYPING SEROLOGIC ABO: CPT | Performed by: FAMILY MEDICINE

## 2017-08-07 PROCEDURE — 86901 BLOOD TYPING SEROLOGIC RH(D): CPT | Performed by: FAMILY MEDICINE

## 2017-08-07 PROCEDURE — 86762 RUBELLA ANTIBODY: CPT | Mod: 90 | Performed by: FAMILY MEDICINE

## 2017-08-07 PROCEDURE — 86787 VARICELLA-ZOSTER ANTIBODY: CPT | Mod: 90 | Performed by: FAMILY MEDICINE

## 2017-08-07 PROCEDURE — 87340 HEPATITIS B SURFACE AG IA: CPT | Mod: 90 | Performed by: FAMILY MEDICINE

## 2017-08-07 PROCEDURE — 87086 URINE CULTURE/COLONY COUNT: CPT | Performed by: FAMILY MEDICINE

## 2017-08-07 PROCEDURE — 36415 COLL VENOUS BLD VENIPUNCTURE: CPT | Performed by: FAMILY MEDICINE

## 2017-08-07 PROCEDURE — 87389 HIV-1 AG W/HIV-1&-2 AB AG IA: CPT | Mod: 90 | Performed by: FAMILY MEDICINE

## 2017-08-07 PROCEDURE — 81003 URINALYSIS AUTO W/O SCOPE: CPT | Performed by: FAMILY MEDICINE

## 2017-08-08 LAB — T PALLIDUM IGG+IGM SER QL: NEGATIVE

## 2017-08-09 LAB
BACTERIA SPEC CULT: ABNORMAL
MICRO REPORT STATUS: ABNORMAL
SPECIMEN SOURCE: ABNORMAL

## 2017-08-10 LAB
RUBV IGG SERPL IA-ACNC: 8 IU/ML
VZV IGG SER QL IA: 2.9 AI (ref 0–0.8)

## 2017-08-31 ENCOUNTER — PRENATAL OFFICE VISIT (OUTPATIENT)
Dept: FAMILY MEDICINE | Facility: OTHER | Age: 32
End: 2017-08-31
Attending: FAMILY MEDICINE
Payer: COMMERCIAL

## 2017-08-31 VITALS
HEART RATE: 82 BPM | HEIGHT: 68 IN | WEIGHT: 151 LBS | TEMPERATURE: 96.5 F | BODY MASS INDEX: 22.88 KG/M2 | DIASTOLIC BLOOD PRESSURE: 68 MMHG | OXYGEN SATURATION: 100 % | SYSTOLIC BLOOD PRESSURE: 104 MMHG

## 2017-08-31 DIAGNOSIS — Z34.80 ENCOUNTER FOR SUPERVISION OF OTHER NORMAL PREGNANCY: ICD-10-CM

## 2017-08-31 DIAGNOSIS — R79.89 LOW SERUM PROGESTERONE: ICD-10-CM

## 2017-08-31 LAB
PROGEST SERPL-MCNC: NORMAL NG/ML
SPECIMEN SOURCE: NORMAL
WET PREP SPEC: NORMAL

## 2017-08-31 PROCEDURE — 99207 ZZC FIRST OB VISIT: CPT | Performed by: FAMILY MEDICINE

## 2017-08-31 PROCEDURE — 87591 N.GONORRHOEAE DNA AMP PROB: CPT | Mod: 90 | Performed by: FAMILY MEDICINE

## 2017-08-31 PROCEDURE — 99000 SPECIMEN HANDLING OFFICE-LAB: CPT | Performed by: FAMILY MEDICINE

## 2017-08-31 PROCEDURE — 87210 SMEAR WET MOUNT SALINE/INK: CPT | Performed by: FAMILY MEDICINE

## 2017-08-31 PROCEDURE — 88142 CYTOPATH C/V THIN LAYER: CPT | Performed by: FAMILY MEDICINE

## 2017-08-31 PROCEDURE — 87491 CHLMYD TRACH DNA AMP PROBE: CPT | Mod: 90 | Performed by: FAMILY MEDICINE

## 2017-08-31 PROCEDURE — 87624 HPV HI-RISK TYP POOLED RSLT: CPT | Mod: 90 | Performed by: FAMILY MEDICINE

## 2017-08-31 ASSESSMENT — ANXIETY QUESTIONNAIRES
3. WORRYING TOO MUCH ABOUT DIFFERENT THINGS: NOT AT ALL
1. FEELING NERVOUS, ANXIOUS, OR ON EDGE: NOT AT ALL
5. BEING SO RESTLESS THAT IT IS HARD TO SIT STILL: NOT AT ALL
4. TROUBLE RELAXING: NOT AT ALL
IF YOU CHECKED OFF ANY PROBLEMS ON THIS QUESTIONNAIRE, HOW DIFFICULT HAVE THESE PROBLEMS MADE IT FOR YOU TO DO YOUR WORK, TAKE CARE OF THINGS AT HOME, OR GET ALONG WITH OTHER PEOPLE: NOT DIFFICULT AT ALL
7. FEELING AFRAID AS IF SOMETHING AWFUL MIGHT HAPPEN: NOT AT ALL
6. BECOMING EASILY ANNOYED OR IRRITABLE: SEVERAL DAYS
2. NOT BEING ABLE TO STOP OR CONTROL WORRYING: NOT AT ALL
GAD7 TOTAL SCORE: 1

## 2017-08-31 ASSESSMENT — PATIENT HEALTH QUESTIONNAIRE - PHQ9: SUM OF ALL RESPONSES TO PHQ QUESTIONS 1-9: 2

## 2017-08-31 ASSESSMENT — PAIN SCALES - GENERAL: PAINLEVEL: NO PAIN (0)

## 2017-08-31 NOTE — NURSING NOTE
"Chief Complaint   Patient presents with     Prenatal Care       Initial /68 (BP Location: Left arm, Patient Position: Chair, Cuff Size: Adult Regular)  Pulse 82  Temp 96.5  F (35.8  C) (Tympanic)  Ht 5' 8\" (1.727 m)  Wt 151 lb (68.5 kg)  LMP 04/07/2017 (Exact Date)  SpO2 100%  BMI 22.96 kg/m2 Estimated body mass index is 22.96 kg/(m^2) as calculated from the following:    Height as of this encounter: 5' 8\" (1.727 m).    Weight as of this encounter: 151 lb (68.5 kg).  Medication Reconciliation: complete     Lena Monterroso     "

## 2017-08-31 NOTE — PROGRESS NOTES
Female History and Physical for New OB     Viki Busch MRN# [id number]   YOB: 1985 Age: 32 year old     Primary care provider: Ludy Reza                 Chief Complaint:     History is obtained from the patient         History of Present Illness:   This patient is a 32 year old female who presents for NOB exam             Past Medical History:     Past Medical History:   Diagnosis Date     Abnormal cervical Papanicolaou smear 2010     PCOS (polycystic ovarian syndrome) 2017     Premenstrual syndrome 2017             Past Surgical History:     Past Surgical History:   Procedure Laterality Date     HC TOOTH EXTRACTION W/FORCEP Bilateral 06/01/2016              Gynecologic History:   Patient's last menstrual period was 06/10/2017.          Obstetrical History:   See Epic         Social History:     Social History   Substance Use Topics     Smoking status: Former Smoker     Packs/day: 0.20     Years: 6.00     Types: Cigarettes     Smokeless tobacco: Not on file      Comment: quit 2010     Alcohol use Yes      Comment: 2-3x/month             Family History:     Family History   Problem Relation Age of Onset     Thyroid Cancer Mother      Hyperlipidemia Father      Other - See Comments Maternal Grandmother      depression     Other - See Comments Maternal Grandfather      old age     Other - See Comments Paternal Grandmother      old age     Other - See Comments Paternal Grandfather      old age             Immunizations:     Immunization History   Administered Date(s) Administered     TDAP Vaccine (Adacel) 04/07/2017            Allergies:   No Known Allergies          Medications:     Current Outpatient Prescriptions:      progesterone (PROMETRIUM) 100 MG capsule, Take 1 capsule (100 mg) by mouth At Bedtime And take 1 capsule vaginally at bedtime, Disp: 60 capsule, Rfl: 1     Prenatal Vit-Fe Fumarate-FA (PRENATAL FORMULA) 27-1 MG TABS, Take 1 tablet daily, Disp: 90 tablet, Rfl: 3     fish  "oil-omega-3 fatty acids 1000 MG capsule, Take 2 g by mouth 2 times daily, Disp: , Rfl:      acetylcysteine (N-ACETYL CYSTEINE) 600 MG CAPS capsule, Take 1 capsule (600 mg) by mouth 2 times daily, Disp: 60 capsule, Rfl: 1            Review of Systems:   A comprehensive, 10 point review of systems was performed and found to be negative               Physical Exam:   Vitals were reviewed  Blood pressure 104/68, pulse 82, temperature 96.5  F (35.8  C), temperature source Tympanic, height 5' 8\" (1.727 m), weight 151 lb (68.5 kg), last menstrual period 06/10/2017, SpO2 100 %, not currently breastfeeding.  Constitutional:   awake, alert, cooperative, no apparent distress, and appears stated age   Eyes:   Lids and lashes normal, pupils equal, round and reactive to light, extra ocular muscles intact, sclera clear, conjunctiva normal   ENT:   Normocephalic, without obvious abnormality, atramatic, external ears without lesions, oral pharynx with moist mucus membranes, tonsils without erythema or exudates, gums normal and good dentition.   Neck:   Supple, symmetrical, trachea midline, no adenopathy, thyroid symmetric, not enlarged and no tenderness, skin normal   Hematologic / Lymphatic:   no cervical lymphadenopathy   Lungs:   No increased work of breathing, good air exchange, clear to auscultation bilaterally, no crackles or wheezing   Cardiovascular:   Normal apical impulse, regular rate and rhythm, normal S1 and S2, no S3 or S4, and no murmur noted   Abdomen:   No scars, normal bowel sounds, soft, non-distended, non-tender, no masses palpated, no hepatosplenomegally   Chest / Breast:   Breasts symmetrical, skin without lesion(s), no nipple retraction or dimpling, no nipple discharge, no masses palpated, no axillary or supraclavicular adenopathy   Genitounirinary:   External Genitalia:  General appearance; normal  Urethra:  Fullness absent  Bladder:  Fullness absent, Masses absent  Vagina:  General appearance normal  Cervix: "  General appearance normal  Uterus:  Size normal, for 12 wk GA  Adenexa:  Masses absent, Tenderness absent   Musculoskeletal:   There is no redness, warmth, or swelling of the joints.  Full range of motion noted.  Motor strength is 5 out of 5 all extremities bilaterally.  Tone is normal.   Neurologic:   Awake, alert, oriented to name, place and time.  Cranial nerves II-XII are grossly intact.  Motor is 5 out of 5 bilaterally.  Sensory is intact.  and gait is normal.   Neuropsychiatric:   General: normal, calm and normal eye contact  Level of consciousness: alert / normal  Affect: normal and pleasant  Orientation: oriented to self, place, time and situation  Memory and insight: normal, memory for past and recent events intact and thought process normal   Skin:   no bruising or bleeding, Body Locations:   No rashes          Data:     Prenatal Office Visit on 08/31/2017   Component Date Value Ref Range Status     Progesterone 08/31/2017 Canceled, Test credited  ng/mL Final    NOT NEEDED PER DR ZAPATA     Specimen Description 08/31/2017 Vagina   Final     Wet Prep 08/31/2017    Final                    Value:Few  WBC'S seen       Wet Prep 08/31/2017 No Trichomonas seen   Final     Wet Prep 08/31/2017 No clue cells seen   Final     Wet Prep 08/31/2017 No yeast seen   Final   ]         Assessment and Plan:   (R79.89) Low serum progesterone  Comment:   Plan: will do labs next week    (Z34.80) Encounter for supervision of other normal pregnancy  Comment:   Plan: A pap thin layer screen with  HPV - recommended        age 30 - 65 years (select HPV order below), HPV        High Risk Types DNA Cervical, NEISSERIA         GONORRHOEA PCR, CHLAMYDIA TRACHOMATIS PCR        F/u 4 wks  Did not discuss quad screen       Patient was agreeable to this plan and had no further questions.    Ludy Zapata MD  8/31/2017  1:13 PM

## 2017-08-31 NOTE — MR AVS SNAPSHOT
After Visit Summary   8/31/2017    Viki Busch    MRN: [id number]           Patient Information     Date Of Birth          1985        Visit Information        Provider Department      8/31/2017 8:00 AM Ludy Reza MD [facility] [place]        Today's Diagnoses     Low serum progesterone        Encounter for supervision of other normal pregnancy           Follow-ups after your visit        Your next 10 appointments already scheduled     Sep 29, 2017  9:30 AM CDT   (Arrive by 9:15 AM)   ESTABLISHED PRENATAL with Ludy Reza MD   [facility] [place] ([facility] )    [address] [place] [address]   [phone]              Who to contact     If you have questions or need follow up information about today's clinic visit or your schedule please contact [facility] [place] directly at [phone].  Normal or non-critical lab and imaging results will be communicated to you by MyChart, letter or phone within 4 business days after the clinic has received the results. If you do not hear from us within 7 days, please contact the clinic through [employer] or phone. If you have a critical or abnormal lab result, we will notify you by phone as soon as possible.  Submit refill requests through [employer] or call your pharmacy and they will forward the refill request to us. Please allow 3 business days for your refill to be completed.          Additional Information About Your Visit        MyChart Information     [employer] gives you secure access to your electronic health record. If you see a primary care provider, you can also send messages to your care team and make appointments. If you have questions, please call your primary care clinic.  If you do not have a primary care provider, please call [phone] and they will assist you.        Care EveryWhere ID     This is your Care EveryWhere ID. This could be used by other organizations to access your  "[place] medical records  [id number]        Your Vitals Were     Pulse Temperature Height Last Period Pulse Oximetry BMI (Body Mass Index)    82 96.5  F (35.8  C) (Tympanic) 5' 8\" (1.727 m) 06/10/2017 100% 22.96 kg/m2       Blood Pressure from Last 3 Encounters:   08/31/17 104/68   07/07/17 114/82   05/10/17 116/70    Weight from Last 3 Encounters:   08/31/17 151 lb (68.5 kg)   07/07/17 154 lb (69.9 kg)   05/10/17 150 lb (68 kg)              We Performed the Following     A pap thin layer screen with  HPV - recommended age 30 - 65 years (select HPV order below)     CHLAMYDIA TRACHOMATIS PCR     HPV High Risk Types DNA Cervical     NEISSERIA GONORRHOEA PCR     Progesterone        Primary Care Provider Office Phone # Fax #    Ludy Reza [phone]       [facility] [place] [address]  [place] [address]        Equal Access to Services     [facility] AH: Hadii aad ku hadasho Soomaali, waaxda luqadaha, qaybta kaalmada adeegyada, waxay idiin hayvandanan ryan grossman . So [facility] [phone].    ATENCIÓN: Si habla español, tiene a guerrero disposición servicios gratuitos de asistencia lingüística. Llame al [phone].    We comply with applicable federal civil rights laws and [place] laws. We do not discriminate on the basis of race, color, national origin, age, disability sex, sexual orientation or gender identity.            Thank you!     Thank you for choosing [facility] [place]  for your care. Our goal is always to provide you with excellent care. Hearing back from our patients is one way we can continue to improve our services. Please take a few minutes to complete the written survey that you may receive in the mail after your visit with us. Thank you!             Your Updated Medication List - Protect others around you: Learn how to safely use, store and throw away your medicines at www.disposemymeds.org.          This list is accurate as of: 8/31/17  8:43 AM.  Always use " your most recent med list.                   Brand Name Dispense Instructions for use Diagnosis    acetylcysteine 600 MG Caps capsule    N-ACETYL CYSTEINE    60 capsule    Take 1 capsule (600 mg) by mouth 2 times daily    PCOS (polycystic ovarian syndrome)       fish oil-omega-3 fatty acids 1000 MG capsule      Take 2 g by mouth 2 times daily        PRENATAL FORMULA 27-1 MG Tabs     90 tablet    Take 1 tablet daily    Supervision of other normal pregnancy, antepartum       progesterone 100 MG capsule    PROMETRIUM    60 capsule    Take 1 capsule (100 mg) by mouth At Bedtime And take 1 capsule vaginally at bedtime    Supervision of other normal pregnancy, antepartum, Low serum progesterone

## 2017-09-01 LAB
C TRACH DNA SPEC QL NAA+PROBE: NEGATIVE
N GONORRHOEA DNA SPEC QL NAA+PROBE: NEGATIVE
SPECIMEN SOURCE: NORMAL
SPECIMEN SOURCE: NORMAL

## 2017-09-01 ASSESSMENT — ANXIETY QUESTIONNAIRES: GAD7 TOTAL SCORE: 1

## 2017-09-05 DIAGNOSIS — R79.89 LOW SERUM PROGESTERONE: ICD-10-CM

## 2017-09-05 LAB
COPATH REPORT: NORMAL
PAP: NORMAL
PROGEST SERPL-MCNC: 38.3 NG/ML

## 2017-09-05 PROCEDURE — 99000 SPECIMEN HANDLING OFFICE-LAB: CPT | Performed by: FAMILY MEDICINE

## 2017-09-05 PROCEDURE — 36415 COLL VENOUS BLD VENIPUNCTURE: CPT | Performed by: FAMILY MEDICINE

## 2017-09-05 PROCEDURE — 84144 ASSAY OF PROGESTERONE: CPT | Mod: 90 | Performed by: FAMILY MEDICINE

## 2017-09-06 LAB
FINAL DIAGNOSIS: NORMAL
HPV HR 12 DNA CVX QL NAA+PROBE: NEGATIVE
HPV16 DNA SPEC QL NAA+PROBE: NEGATIVE
HPV18 DNA SPEC QL NAA+PROBE: NEGATIVE
SPECIMEN DESCRIPTION: NORMAL

## 2017-09-29 ENCOUNTER — PRENATAL OFFICE VISIT (OUTPATIENT)
Dept: FAMILY MEDICINE | Facility: OTHER | Age: 32
End: 2017-09-29
Attending: FAMILY MEDICINE
Payer: COMMERCIAL

## 2017-09-29 VITALS
SYSTOLIC BLOOD PRESSURE: 118 MMHG | OXYGEN SATURATION: 100 % | HEIGHT: 68 IN | TEMPERATURE: 97.3 F | DIASTOLIC BLOOD PRESSURE: 62 MMHG | HEART RATE: 62 BPM | BODY MASS INDEX: 24.74 KG/M2 | WEIGHT: 163.25 LBS

## 2017-09-29 DIAGNOSIS — Z34.80 SUPERVISION OF OTHER NORMAL PREGNANCY, ANTEPARTUM: Primary | ICD-10-CM

## 2017-09-29 PROCEDURE — 99207 ZZC PRENATAL VISIT: CPT | Performed by: FAMILY MEDICINE

## 2017-09-29 ASSESSMENT — ANXIETY QUESTIONNAIRES
1. FEELING NERVOUS, ANXIOUS, OR ON EDGE: NOT AT ALL
GAD7 TOTAL SCORE: 0
5. BEING SO RESTLESS THAT IT IS HARD TO SIT STILL: NOT AT ALL
4. TROUBLE RELAXING: NOT AT ALL
2. NOT BEING ABLE TO STOP OR CONTROL WORRYING: NOT AT ALL
7. FEELING AFRAID AS IF SOMETHING AWFUL MIGHT HAPPEN: NOT AT ALL
IF YOU CHECKED OFF ANY PROBLEMS ON THIS QUESTIONNAIRE, HOW DIFFICULT HAVE THESE PROBLEMS MADE IT FOR YOU TO DO YOUR WORK, TAKE CARE OF THINGS AT HOME, OR GET ALONG WITH OTHER PEOPLE: NOT DIFFICULT AT ALL
3. WORRYING TOO MUCH ABOUT DIFFERENT THINGS: NOT AT ALL
6. BECOMING EASILY ANNOYED OR IRRITABLE: NOT AT ALL

## 2017-09-29 ASSESSMENT — PATIENT HEALTH QUESTIONNAIRE - PHQ9: SUM OF ALL RESPONSES TO PHQ QUESTIONS 1-9: 0

## 2017-09-29 ASSESSMENT — PAIN SCALES - GENERAL: PAINLEVEL: NO PAIN (0)

## 2017-09-29 NOTE — NURSING NOTE
"Chief Complaint   Patient presents with     Prenatal Care       Initial /62 (BP Location: Right arm, Patient Position: Chair, Cuff Size: Adult Large)  Pulse 62  Temp 97.3  F (36.3  C) (Tympanic)  Ht 5' 8\" (1.727 m)  Wt 163 lb 4 oz (74 kg)  LMP 06/10/2017  SpO2 100%  BMI 24.82 kg/m2 Estimated body mass index is 24.82 kg/(m^2) as calculated from the following:    Height as of this encounter: 5' 8\" (1.727 m).    Weight as of this encounter: 163 lb 4 oz (74 kg).  Medication Reconciliation: complete     Sarah Holman    "

## 2017-09-29 NOTE — MR AVS SNAPSHOT
After Visit Summary   9/29/2017    Viki Busch    MRN: [id number]           Patient Information     Date Of Birth          1985        Visit Information        Provider Department      9/29/2017 9:30 AM Ludy Reza MD [facility]        Today's Diagnoses     Supervision of other normal pregnancy, antepartum    -  1       Follow-ups after your visit        Your next 10 appointments already scheduled     Oct 27, 2017  2:30 PM CDT   (Arrive by 2:15 PM)   ESTABLISHED PRENATAL with Ludy Reza MD   [facility] [place] [facility]    [address] [place] [address]   [phone]              Who to contact     If you have questions or need follow up information about today's clinic visit or your schedule please contact [facility] directly at [phone].  Normal or non-critical lab and imaging results will be communicated to you by MyChart, letter or phone within 4 business days after the clinic has received the results. If you do not hear from us within 7 days, please contact the clinic through [email or web address] or phone. If you have a critical or abnormal lab result, we will notify you by phone as soon as possible.  Submit refill requests through [email or web address] or call your pharmacy and they will forward the refill request to us. Please allow 3 business days for your refill to be completed.          Additional Information About Your Visit        MyChart Information     [email or web address] gives you secure access to your electronic health record. If you see a primary care provider, you can also send messages to your care team and make appointments. If you have questions, please call your primary care clinic.  If you do not have a primary care provider, please call [phone] and they will assist you.        Care EveryWhere ID     This is your Care EveryWhere ID. This could be used by other organizations to access your [facility]  "records  [id number]        Your Vitals Were     Pulse Temperature Height Last Period Pulse Oximetry BMI (Body Mass Index)    62 97.3  F (36.3  C) (Tympanic) 5' 8\" (1.727 m) 06/06/2017 100% 24.82 kg/m2       Blood Pressure from Last 3 Encounters:   09/29/17 118/62   08/31/17 104/68   07/07/17 114/82    Weight from Last 3 Encounters:   09/29/17 163 lb 4 oz (74 kg)   08/31/17 151 lb (68.5 kg)   07/07/17 154 lb (69.9 kg)              We Performed the Following     US OB > 14 Weeks Complete Single        Primary Care Provider Office Phone # Fax #    Ludy Reza [phone]       [facility] [place] [address]  [place] [address]        Equal Access to Services     [facility]: Hadii aad ku hadasho Soomaali, waaxda luqadaha, qaybta kaalmada adeegyada, waxay idiin hayaan adegeri grossman . So [facility] [phone].    ATENCIÓN: Si habla español, tiene a guerrero disposición servicios gratuitos de asistencia lingüística. Llame al [phone].    We comply with applicable federal civil rights laws and [place] laws. We do not discriminate on the basis of race, color, national origin, age, disability sex, sexual orientation or gender identity.            Thank you!     Thank you for choosing [facility]  for your care. Our goal is always to provide you with excellent care. Hearing back from our patients is one way we can continue to improve our services. Please take a few minutes to complete the written survey that you may receive in the mail after your visit with us. Thank you!             Your Updated Medication List - Protect others around you: Learn how to safely use, store and throw away your medicines at www.disposemymeds.org.          This list is accurate as of: 9/29/17 10:24 AM.  Always use your most recent med list.                   Brand Name Dispense Instructions for use Diagnosis    acetylcysteine 600 MG Caps capsule    N-ACETYL CYSTEINE    60 capsule    Take 1 " capsule (600 mg) by mouth 2 times daily    PCOS (polycystic ovarian syndrome)       fish oil-omega-3 fatty acids 1000 MG capsule      Take 2 g by mouth 2 times daily        PRENATAL FORMULA 27-1 MG Tabs     90 tablet    Take 1 tablet daily    Supervision of other normal pregnancy, antepartum       progesterone 100 MG capsule    PROMETRIUM    60 capsule    TAKE 1 CAPSULE BY MOUTH EVERY NIGHT AT BEDTIME AND INSERT 1 CAPSULE VAGINALLY EVERY NIGHT AT BEDTIME    Supervision of other normal pregnancy, antepartum, Low serum progesterone

## 2017-09-29 NOTE — PROGRESS NOTES
Is thinking about quad screen  Will set up for u/s  Discussed LUCINDA  Feeling really good, discussed weight gain

## 2017-09-30 ASSESSMENT — ANXIETY QUESTIONNAIRES: GAD7 TOTAL SCORE: 0

## 2017-10-23 ENCOUNTER — HOSPITAL ENCOUNTER (OUTPATIENT)
Dept: ULTRASOUND IMAGING | Facility: HOSPITAL | Age: 32
End: 2017-10-23
Attending: FAMILY MEDICINE
Payer: COMMERCIAL

## 2017-10-23 DIAGNOSIS — Z34.80 ENCOUNTER FOR SUPERVISION OF OTHER NORMAL PREGNANCY: ICD-10-CM

## 2017-10-23 PROCEDURE — 76805 OB US >/= 14 WKS SNGL FETUS: CPT | Mod: TC

## 2017-10-24 ENCOUNTER — MYC MEDICAL ADVICE (OUTPATIENT)
Dept: FAMILY MEDICINE | Facility: OTHER | Age: 32
End: 2017-10-24

## 2017-10-27 ENCOUNTER — PRENATAL OFFICE VISIT (OUTPATIENT)
Dept: FAMILY MEDICINE | Facility: OTHER | Age: 32
End: 2017-10-27
Attending: FAMILY MEDICINE
Payer: COMMERCIAL

## 2017-10-27 VITALS — DIASTOLIC BLOOD PRESSURE: 58 MMHG | SYSTOLIC BLOOD PRESSURE: 118 MMHG | BODY MASS INDEX: 25.7 KG/M2 | WEIGHT: 169 LBS

## 2017-10-27 DIAGNOSIS — R79.89 LOW SERUM PROGESTERONE: ICD-10-CM

## 2017-10-27 DIAGNOSIS — Z34.80 SUPERVISION OF OTHER NORMAL PREGNANCY, ANTEPARTUM: Primary | ICD-10-CM

## 2017-10-27 LAB
ALBUMIN UR-MCNC: 10 MG/DL
APPEARANCE UR: CLEAR
BILIRUB UR QL STRIP: NEGATIVE
COLOR UR AUTO: YELLOW
GLUCOSE UR STRIP-MCNC: NEGATIVE MG/DL
HGB UR QL STRIP: NEGATIVE
KETONES UR STRIP-MCNC: 10 MG/DL
LEUKOCYTE ESTERASE UR QL STRIP: NEGATIVE
NITRATE UR QL: NEGATIVE
PH UR STRIP: 6 PH (ref 4.7–8)
SOURCE: ABNORMAL
SP GR UR STRIP: 1.02 (ref 1–1.03)
UROBILINOGEN UR STRIP-MCNC: NORMAL MG/DL (ref 0–2)

## 2017-10-27 PROCEDURE — 87086 URINE CULTURE/COLONY COUNT: CPT | Performed by: FAMILY MEDICINE

## 2017-10-27 PROCEDURE — 99000 SPECIMEN HANDLING OFFICE-LAB: CPT | Performed by: FAMILY MEDICINE

## 2017-10-27 PROCEDURE — 36415 COLL VENOUS BLD VENIPUNCTURE: CPT | Performed by: FAMILY MEDICINE

## 2017-10-27 PROCEDURE — 81003 URINALYSIS AUTO W/O SCOPE: CPT | Performed by: FAMILY MEDICINE

## 2017-10-27 PROCEDURE — 99207 ZZC PRENATAL VISIT: CPT | Performed by: FAMILY MEDICINE

## 2017-10-27 PROCEDURE — 84144 ASSAY OF PROGESTERONE: CPT | Mod: 90 | Performed by: FAMILY MEDICINE

## 2017-10-27 NOTE — MR AVS SNAPSHOT
After Visit Summary   10/27/2017    Viki Busch    MRN: [id number]           Patient Information     Date Of Birth          1985        Visit Information        Provider Department      10/27/2017 2:30 PM Ludy Reza MD [facility] [place]        Today's Diagnoses     Supervision of other normal pregnancy, antepartum    -  1    Low serum progesterone           Follow-ups after your visit        Your next 10 appointments already scheduled     Nov 27, 2017  3:30 PM CST   (Arrive by 3:15 PM)   ESTABLISHED PRENATAL with Ludy Reza MD   [facility] [place] ([facility] [place] )    [address]  [place] [address]   [phone]              Future tests that were ordered for you today     Open Future Orders        Priority Expected Expires Ordered    Glucose tolerance gest screen 1 hour Routine 11/10/2017 10/2/2018 10/27/2017    CBC with platelets Routine 11/10/2017 10/2/2018 10/27/2017            Who to contact     If you have questions or need follow up information about today's clinic visit or your schedule please contact [facility] directly at [phone].  Normal or non-critical lab and imaging results will be communicated to you by MyChart, letter or phone within 4 business days after the clinic has received the results. If you do not hear from us within 7 days, please contact the clinic through Ixchelsishart or phone. If you have a critical or abnormal lab result, we will notify you by phone as soon as possible.  Submit refill requests through [employer] or call your pharmacy and they will forward the refill request to us. Please allow 3 business days for your refill to be completed.          Additional Information About Your Visit        MyChart Information     [employer] gives you secure access to your electronic health record. If you see a primary care provider, you can also send messages to your care team and make appointments. If you have  questions, please call your primary care clinic.  If you do not have a primary care provider, please call [phone] and they will assist you.        Care EveryWhere ID     This is your Care EveryWhere ID. This could be used by other organizations to access your [place] medical records  [id number]        Your Vitals Were     Last Period BMI (Body Mass Index)                06/06/2017 25.7 kg/m2           Blood Pressure from Last 3 Encounters:   10/27/17 118/58   09/29/17 118/62   08/31/17 104/68    Weight from Last 3 Encounters:   10/27/17 169 lb (76.7 kg)   09/29/17 163 lb 4 oz (74 kg)   08/31/17 151 lb (68.5 kg)              We Performed the Following     Progesterone     UA without Microscopic     Urine Culture Aerobic Bacterial        Primary Care Provider Office Phone # Fax #    Ludy Reza [phone]       [facility] [place] [address]  [place] [address]        Equal Access to Services     [facility] : Hadii aad ku hadasho Soomaali, waaxda luqadaha, qaybta kaalmada adeegyada, waxay idiin hayaan adeeg srinivas la'deloris . So [facility] [phone].    ATENCIÓN: Si habla español, tiene a guerrero disposición servicios gratuitos de asistencia lingüística. Llame al [phone].    We comply with applicable federal civil rights laws and [place] laws. We do not discriminate on the basis of race, color, national origin, age, disability, sex, sexual orientation, or gender identity.            Thank you!     Thank you for choosing [facility] [place]  for your care. Our goal is always to provide you with excellent care. Hearing back from our patients is one way we can continue to improve our services. Please take a few minutes to complete the written survey that you may receive in the mail after your visit with us. Thank you!             Your Updated Medication List - Protect others around you: Learn how to safely use, store and throw away your medicines at www.disposemymeds.org.           This list is accurate as of: 10/27/17  3:13 PM.  Always use your most recent med list.                   Brand Name Dispense Instructions for use Diagnosis    acetylcysteine 600 MG Caps capsule    N-ACETYL CYSTEINE    60 capsule    Take 1 capsule (600 mg) by mouth 2 times daily    PCOS (polycystic ovarian syndrome)       fish oil-omega-3 fatty acids 1000 MG capsule      Take 2 g by mouth 2 times daily        PRENATAL FORMULA 27-1 MG Tabs     90 tablet    Take 1 tablet daily    Supervision of other normal pregnancy, antepartum       progesterone 100 MG capsule    PROMETRIUM    60 capsule    TAKE 1 CAPSULE BY MOUTH EVERY NIGHT AT BEDTIME AND INSERT 1 CAPSULE VAGINALLY EVERY NIGHT AT BEDTIME    Supervision of other normal pregnancy, antepartum, Low serum progesterone

## 2017-10-27 NOTE — PROGRESS NOTES
Doing well, feeling good  progest level today  Fetal survey was wnl, education done  gct and cbc next visit

## 2017-10-29 LAB
BACTERIA SPEC CULT: NORMAL
SPECIMEN SOURCE: NORMAL

## 2017-10-30 LAB — PROGEST SERPL-MCNC: 42.9 NG/ML

## 2017-11-27 ENCOUNTER — PRENATAL OFFICE VISIT (OUTPATIENT)
Dept: FAMILY MEDICINE | Facility: OTHER | Age: 32
End: 2017-11-27
Attending: FAMILY MEDICINE
Payer: COMMERCIAL

## 2017-11-27 VITALS
WEIGHT: 181 LBS | SYSTOLIC BLOOD PRESSURE: 118 MMHG | HEIGHT: 68 IN | DIASTOLIC BLOOD PRESSURE: 60 MMHG | BODY MASS INDEX: 27.43 KG/M2

## 2017-11-27 DIAGNOSIS — R79.89 LOW SERUM PROGESTERONE: ICD-10-CM

## 2017-11-27 DIAGNOSIS — Z34.80 SUPERVISION OF OTHER NORMAL PREGNANCY, ANTEPARTUM: ICD-10-CM

## 2017-11-27 DIAGNOSIS — Z34.80 SUPERVISION OF OTHER NORMAL PREGNANCY, ANTEPARTUM: Primary | ICD-10-CM

## 2017-11-27 LAB
ERYTHROCYTE [DISTWIDTH] IN BLOOD BY AUTOMATED COUNT: 12.8 % (ref 10–15)
GLUCOSE 1H P 50 G GLC PO SERPL-MCNC: 125 MG/DL (ref 60–129)
HCT VFR BLD AUTO: 36.4 % (ref 35–47)
HGB BLD-MCNC: 12.7 G/DL (ref 11.7–15.7)
MCH RBC QN AUTO: 31.8 PG (ref 26.5–33)
MCHC RBC AUTO-ENTMCNC: 34.9 G/DL (ref 31.5–36.5)
MCV RBC AUTO: 91 FL (ref 78–100)
PLATELET # BLD AUTO: 203 10E9/L (ref 150–450)
PROGEST SERPL-MCNC: 80 NG/ML
RBC # BLD AUTO: 4 10E12/L (ref 3.8–5.2)
WBC # BLD AUTO: 7.9 10E9/L (ref 4–11)

## 2017-11-27 PROCEDURE — 99000 SPECIMEN HANDLING OFFICE-LAB: CPT | Performed by: FAMILY MEDICINE

## 2017-11-27 PROCEDURE — 99207 ZZC PRENATAL VISIT: CPT | Performed by: FAMILY MEDICINE

## 2017-11-27 PROCEDURE — 36415 COLL VENOUS BLD VENIPUNCTURE: CPT | Performed by: FAMILY MEDICINE

## 2017-11-27 PROCEDURE — 82950 GLUCOSE TEST: CPT | Performed by: FAMILY MEDICINE

## 2017-11-27 PROCEDURE — 85027 COMPLETE CBC AUTOMATED: CPT | Performed by: FAMILY MEDICINE

## 2017-11-27 PROCEDURE — 84144 ASSAY OF PROGESTERONE: CPT | Mod: 90 | Performed by: FAMILY MEDICINE

## 2017-11-27 ASSESSMENT — PAIN SCALES - GENERAL: PAINLEVEL: NO PAIN (0)

## 2017-11-27 NOTE — MR AVS SNAPSHOT
After Visit Summary   11/27/2017    Viki Busch    MRN: [id number]           Patient Information     Date Of Birth          1985        Visit Information        Provider Department      11/27/2017 3:30 PM Ludy Reza MD [facility]        Today's Diagnoses     Supervision of other normal pregnancy, antepartum    -  1       Follow-ups after your visit        Your next 10 appointments already scheduled     Dec 20, 2017 11:00 AM CST   (Arrive by 10:45 AM)   ESTABLISHED PRENATAL with Ludy Reza MD   [facility] [place] ([facility] [place] )    [address]  [place] [address]   [phone]              Who to contact     If you have questions or need follow up information about today's clinic visit or your schedule please contact [facility] directly at [phone].  Normal or non-critical lab and imaging results will be communicated to you by MyChart, letter or phone within 4 business days after the clinic has received the results. If you do not hear from us within 7 days, please contact the clinic through MyChart or phone. If you have a critical or abnormal lab result, we will notify you by phone as soon as possible.  Submit refill requests through MartMania or call your pharmacy and they will forward the refill request to us. Please allow 3 business days for your refill to be completed.          Additional Information About Your Visit        MyChart Information     MartMania gives you secure access to your electronic health record. If you see a primary care provider, you can also send messages to your care team and make appointments. If you have questions, please call your primary care clinic.  If you do not have a primary care provider, please call [phone] and they will assist you.        Care EveryWhere ID     This is your Care EveryWhere ID. This could be used by other organizations to access your [facility]  "records  [id number]        Your Vitals Were     Height Last Period BMI (Body Mass Index)             5' 8\" (1.727 m) 06/06/2017 27.52 kg/m2          Blood Pressure from Last 3 Encounters:   11/27/17 118/60   10/27/17 118/58   09/29/17 118/62    Weight from Last 3 Encounters:   11/27/17 181 lb (82.1 kg)   10/27/17 169 lb (76.7 kg)   09/29/17 163 lb 4 oz (74 kg)              Today, you had the following     No orders found for display       Primary Care Provider Office Phone # Fax #    Ludy CONCEPCION Reza [phone]       [facility] [place] [address]  [place] [address]        Equal Access to Services     [facility] S[facility] : Hadii julianna huffman hadasho Soomaali, waaxda luqadaha, qaybta kaalmada adeegyada, sergo grossman . So [facility] [phone].    ATENCIÓN: Si habla español, tiene a guerrero disposición servicios gratuitos de asistencia lingüística. Llame al [phone].    We comply with applicable federal civil rights laws and [place] laws. We do not discriminate on the basis of race, color, national origin, age, disability, sex, sexual orientation, or gender identity.            Thank you!     Thank you for choosing [facility]  for your care. Our goal is always to provide you with excellent care. Hearing back from our patients is one way we can continue to improve our services. Please take a few minutes to complete the written survey that you may receive in the mail after your visit with us. Thank you!             Your Updated Medication List - Protect others around you: Learn how to safely use, store and throw away your medicines at www.disposemymeds.org.          This list is accurate as of: 11/27/17  4:11 PM.  Always use your most recent med list.                   Brand Name Dispense Instructions for use Diagnosis    acetylcysteine 600 MG Caps capsule    N-ACETYL CYSTEINE    60 capsule    Take 1 capsule (600 mg) by mouth 2 times daily    PCOS (polycystic " ovarian syndrome)       fish oil-omega-3 fatty acids 1000 MG capsule      Take 2 g by mouth 2 times daily        PRENATAL FORMULA 27-1 MG Tabs     90 tablet    Take 1 tablet daily    Supervision of other normal pregnancy, antepartum       progesterone 100 MG capsule    PROMETRIUM    60 capsule    TAKE 1 CAPSULE BY MOUTH EVERY NIGHT AT BEDTIME AND INSERT 1 CAPSULE VAGINALLY EVERY NIGHT AT BEDTIME    Supervision of other normal pregnancy, antepartum, Low serum progesterone

## 2017-11-27 NOTE — NURSING NOTE
"Chief Complaint   Patient presents with     Prenatal Care       Initial /60 (BP Location: Left arm, Patient Position: Chair, Cuff Size: Adult Regular)  Ht 5' 8\" (1.727 m)  Wt 181 lb (82.1 kg)  LMP 06/06/2017  BMI 27.52 kg/m2 Estimated body mass index is 27.52 kg/(m^2) as calculated from the following:    Height as of this encounter: 5' 8\" (1.727 m).    Weight as of this encounter: 181 lb (82.1 kg).  Medication Reconciliation: complete     Lena Monterroso     "

## 2017-12-20 ENCOUNTER — PRENATAL OFFICE VISIT (OUTPATIENT)
Dept: FAMILY MEDICINE | Facility: OTHER | Age: 32
End: 2017-12-20
Attending: FAMILY MEDICINE
Payer: COMMERCIAL

## 2017-12-20 VITALS
HEART RATE: 75 BPM | DIASTOLIC BLOOD PRESSURE: 58 MMHG | WEIGHT: 177.38 LBS | SYSTOLIC BLOOD PRESSURE: 108 MMHG | BODY MASS INDEX: 26.97 KG/M2 | TEMPERATURE: 97.6 F | OXYGEN SATURATION: 100 %

## 2017-12-20 DIAGNOSIS — Z34.80 SUPERVISION OF OTHER NORMAL PREGNANCY, ANTEPARTUM: Primary | ICD-10-CM

## 2017-12-20 DIAGNOSIS — R79.89 LOW SERUM PROGESTERONE: ICD-10-CM

## 2017-12-20 DIAGNOSIS — Z23 NEED FOR VACCINATION: ICD-10-CM

## 2017-12-20 LAB — PROGEST SERPL-MCNC: 92.3 NG/ML

## 2017-12-20 PROCEDURE — 90471 IMMUNIZATION ADMIN: CPT | Performed by: FAMILY MEDICINE

## 2017-12-20 PROCEDURE — 99207 ZZC PRENATAL VISIT: CPT | Performed by: FAMILY MEDICINE

## 2017-12-20 PROCEDURE — 36415 COLL VENOUS BLD VENIPUNCTURE: CPT | Performed by: FAMILY MEDICINE

## 2017-12-20 PROCEDURE — 99000 SPECIMEN HANDLING OFFICE-LAB: CPT | Performed by: FAMILY MEDICINE

## 2017-12-20 PROCEDURE — 90715 TDAP VACCINE 7 YRS/> IM: CPT | Performed by: FAMILY MEDICINE

## 2017-12-20 PROCEDURE — 84144 ASSAY OF PROGESTERONE: CPT | Mod: 90 | Performed by: FAMILY MEDICINE

## 2017-12-20 ASSESSMENT — ANXIETY QUESTIONNAIRES
6. BECOMING EASILY ANNOYED OR IRRITABLE: NOT AT ALL
5. BEING SO RESTLESS THAT IT IS HARD TO SIT STILL: NOT AT ALL
2. NOT BEING ABLE TO STOP OR CONTROL WORRYING: NOT AT ALL
4. TROUBLE RELAXING: NOT AT ALL
1. FEELING NERVOUS, ANXIOUS, OR ON EDGE: NOT AT ALL
7. FEELING AFRAID AS IF SOMETHING AWFUL MIGHT HAPPEN: NOT AT ALL
GAD7 TOTAL SCORE: 0
IF YOU CHECKED OFF ANY PROBLEMS ON THIS QUESTIONNAIRE, HOW DIFFICULT HAVE THESE PROBLEMS MADE IT FOR YOU TO DO YOUR WORK, TAKE CARE OF THINGS AT HOME, OR GET ALONG WITH OTHER PEOPLE: NOT DIFFICULT AT ALL
3. WORRYING TOO MUCH ABOUT DIFFERENT THINGS: NOT AT ALL

## 2017-12-20 ASSESSMENT — PAIN SCALES - GENERAL: PAINLEVEL: NO PAIN (0)

## 2017-12-20 ASSESSMENT — PATIENT HEALTH QUESTIONNAIRE - PHQ9: SUM OF ALL RESPONSES TO PHQ QUESTIONS 1-9: 0

## 2017-12-20 NOTE — PROGRESS NOTES
Tdap today  Doing well, dsicussed labor  Progesterone labs today, may not need any longer though feeling more irritable lately  Education done

## 2017-12-20 NOTE — NURSING NOTE
"Chief Complaint   Patient presents with     Prenatal Care       Initial /58 (BP Location: Right arm, Patient Position: Chair, Cuff Size: Adult Large)  Pulse 75  Temp 97.6  F (36.4  C) (Tympanic)  Wt 177 lb 6 oz (80.5 kg)  LMP 06/06/2017  SpO2 100%  BMI 26.97 kg/m2 Estimated body mass index is 26.97 kg/(m^2) as calculated from the following:    Height as of 11/27/17: 5' 8\" (1.727 m).    Weight as of this encounter: 177 lb 6 oz (80.5 kg).  Medication Reconciliation: complete     Sarah Holman    "

## 2017-12-20 NOTE — MR AVS SNAPSHOT
After Visit Summary   12/20/2017    Viki Busch    MRN: [id number]           Patient Information     Date Of Birth          1985        Visit Information        Provider Department      12/20/2017 11:00 AM Ludy Reza MD [facility] [place]        Today's Diagnoses     Supervision of other normal pregnancy, antepartum    -  1       Follow-ups after your visit        Your next 10 appointments already scheduled     Jan 03, 2018  2:15 PM CST   (Arrive by 2:00 PM)   ESTABLISHED PRENATAL with Ludy Reza MD   [facility] [place] ([facility] [place] )    [address]  [place] [address]   [phone]            Jan 17, 2018  2:30 PM CST   (Arrive by 2:15 PM)   ESTABLISHED PRENATAL with Ludy Reza MD   [facility] [place] ([facility] [place] )    [address]  [place] [address]   [phone]            Jan 31, 2018  4:00 PM CST   (Arrive by 3:45 PM)   ESTABLISHED PRENATAL with Ludy Reza MD   [facility] [place] ([facility] [place] )    [address]  [place] [address]   [phone]            Feb 14, 2018  8:45 AM CST   (Arrive by 8:30 AM)   ESTABLISHED PRENATAL with Ludy Reza MD   [facility] [place] ([facility] [place] )    [address]  [place] [address]   [phone]            Feb 21, 2018 10:30 AM CST   (Arrive by 10:15 AM)   ESTABLISHED PRENATAL with Ludy Reza MD   [facility] [place] ([facility] [place] )    [address]  [place] [address]   [phone]            Feb 28, 2018  9:45 AM CST   (Arrive by 9:30 AM)   ESTABLISHED PRENATAL with Ludy Reza MD   [facility] [place] ([facility] [place] )    [address]  [place] [address]   [phone]            Mar 07, 2018  8:30 AM CST   (Arrive by 8:15 AM)   ESTABLISHED PRENATAL with Ludy Reza MD   [facility] [place]  ([facility] [place] )    [address]  [place] [address]   [phone]            Mar 14, 2018  8:45 AM CDT   (Arrive by 8:30 AM)   ESTABLISHED PRENATAL with [place] MD   [facility] [place] [facility]    [address] [place] [address]   [phone]              Who to contact     If you have questions or need follow up information about today's clinic visit or your schedule please contact [facility] directly at [phone].  Normal or non-critical lab and imaging results will be communicated to you by Innovegahart, letter or phone within 4 business days after the clinic has received the results. If you do not hear from us within 7 days, please contact the clinic through Innovegahart or phone. If you have a critical or abnormal lab result, we will notify you by phone as soon as possible.  Submit refill requests through Epiclist or call your pharmacy and they will forward the refill request to us. Please allow 3 business days for your refill to be completed.          Additional Information About Your Visit        Innovegahart Information     Epiclist gives you secure access to your electronic health record. If you see a primary care provider, you can also send messages to your care team and make appointments. If you have questions, please call your primary care clinic.  If you do not have a primary care provider, please call [phone] and they will assist you.        Care EveryWhere ID     This is your Care EveryWhere ID. This could be used by other organizations to access your [place] medical records  [id number]        Your Vitals Were     Pulse Temperature Last Period Pulse Oximetry BMI (Body Mass Index)       75 97.6  F (36.4  C) (Tympanic) 06/06/2017 100% 26.97 kg/m2        Blood Pressure from Last 3 Encounters:   12/20/17 108/58   11/27/17 118/60   10/27/17 118/58    Weight from Last 3 Encounters:   12/20/17 177 lb 6 oz (80.5 kg)   11/27/17  181 lb (82.1 kg)   10/27/17 169 lb (76.7 kg)              Today, you had the following     No orders found for display       Primary Care Provider Office Phone # Fax #    Ludy Reza [phone]       [facility] [place] [address]  [place] [address]        Equal Access to Services     [facility]: Hadii aad ku hadasho Soomaali, waaxda luqadaha, qaybta kaalmada adeegyada, waxay idiin hayaan adeeg kharash laKaminiaan . So [facility] [phone].    ATENCIÓN: Si habla español, tiene a guerrero disposición servicios gratuitos de asistencia lingüística. Llame al [phone].    We comply with applicable federal civil rights laws and [place] laws. We do not discriminate on the basis of race, color, national origin, age, disability, sex, sexual orientation, or gender identity.            Thank you!     Thank you for choosing [facility]  for your care. Our goal is always to provide you with excellent care. Hearing back from our patients is one way we can continue to improve our services. Please take a few minutes to complete the written survey that you may receive in the mail after your visit with us. Thank you!             Your Updated Medication List - Protect others around you: Learn how to safely use, store and throw away your medicines at www.disposemymeds.org.          This list is accurate as of: 12/20/17 11:47 AM.  Always use your most recent med list.                   Brand Name Dispense Instructions for use Diagnosis    acetylcysteine 600 MG Caps capsule    N-ACETYL CYSTEINE    60 capsule    Take 1 capsule (600 mg) by mouth 2 times daily    PCOS (polycystic ovarian syndrome)       fish oil-omega-3 fatty acids 1000 MG capsule      Take 2 g by mouth 2 times daily        PRENATAL FORMULA 27-1 MG Tabs     90 tablet    Take 1 tablet daily    Supervision of other normal pregnancy, antepartum       progesterone 100 MG capsule    PROMETRIUM    60 capsule    TAKE 1 CAPSULE BY MOUTH  EVERY NIGHT AT BEDTIME AND INSERT 1 CAPSULE VAGINALLY EVERY NIGHT AT BEDTIME    Supervision of other normal pregnancy, antepartum, Low serum progesterone

## 2017-12-21 ASSESSMENT — ANXIETY QUESTIONNAIRES: GAD7 TOTAL SCORE: 0

## 2018-01-03 ENCOUNTER — PRENATAL OFFICE VISIT (OUTPATIENT)
Dept: FAMILY MEDICINE | Facility: OTHER | Age: 33
End: 2018-01-03
Attending: FAMILY MEDICINE
Payer: COMMERCIAL

## 2018-01-03 VITALS
WEIGHT: 181.13 LBS | TEMPERATURE: 97.3 F | SYSTOLIC BLOOD PRESSURE: 116 MMHG | BODY MASS INDEX: 27.54 KG/M2 | DIASTOLIC BLOOD PRESSURE: 62 MMHG

## 2018-01-03 DIAGNOSIS — Z34.80 SUPERVISION OF OTHER NORMAL PREGNANCY, ANTEPARTUM: Primary | ICD-10-CM

## 2018-01-03 PROCEDURE — 99207 ZZC PRENATAL VISIT: CPT | Performed by: FAMILY MEDICINE

## 2018-01-03 ASSESSMENT — PAIN SCALES - GENERAL: PAINLEVEL: NO PAIN (0)

## 2018-01-03 NOTE — MR AVS SNAPSHOT
After Visit Summary   1/3/2018    Viki Busch    MRN: [id number]           Patient Information     Date Of Birth          1985        Visit Information        Provider Department      1/3/2018 2:15 PM Ludy Reza MD [facility] [place]        Today's Diagnoses     Supervision of other normal pregnancy, antepartum    -  1       Follow-ups after your visit        Your next 10 appointments already scheduled     Jan 17, 2018  2:30 PM CST   (Arrive by 2:15 PM)   [facility] [place] [facility] [place] [facility]    [address]  [place] [address]   [phone]            Jan 31, 2018  4:00 PM CST   (Arrive by 3:45 PM)   [facility] [place] [facility] [place] [facility]    [address]  [place] [address]   [phone]            Feb 14, 2018  8:45 AM CST   (Arrive by 8:30 AM)   ESTABLISHED PRENATAL with Ludy Reza MD   [facility] [place] ([facility] [place] [facility]    [address]  [place] [address]   [phone]            Feb 21, 2018 10:30 AM CST   (Arrive by 10:15 AM)   ESTABLISHED PRENATAL with Ludy Reza MD   [facility] [place] ([facility] [place] )    [address]  [place] [address]   [phone]            Feb 28, 2018  9:45 AM CST   (Arrive by 9:30 AM)   ESTABLISHED PRENATAL with Ludy Reza MD   [facility] [place] ([facility] [place] [facility]    [address]  [place] [address]   [phone]            Mar 07, 2018  8:30 AM CST   (Arrive by 8:15 AM)   ESTABLISHED PRENATAL with Ludy Reza MD   [facility] [place] ([facility] [place] )    [address]  [place] [address]   [phone]            Mar 14, 2018  8:45 AM CDT   (Arrive by 8:30 AM)   ESTABLISHED PRENATAL with Ludy Reza MD   [facility] [place] ([place]  [facility] [place] )    [address] [place] [address]   [phone]              Who to contact     If you have questions or need follow up information about today's clinic visit or your schedule please contact [facility] directly at [phone].  Normal or non-critical lab and imaging results will be communicated to you by MyChart, letter or phone within 4 business days after the clinic has received the results. If you do not hear from us within 7 days, please contact the clinic through MyChart or phone. If you have a critical or abnormal lab result, we will notify you by phone as soon as possible.  Submit refill requests through Navita or call your pharmacy and they will forward the refill request to us. Please allow 3 business days for your refill to be completed.          Additional Information About Your Visit        MyChart Information     Navita gives you secure access to your electronic health record. If you see a primary care provider, you can also send messages to your care team and make appointments. If you have questions, please call your primary care clinic.  If you do not have a primary care provider, please call [phone] and they will assist you.        Care EveryWhere ID     This is your Care EveryWhere ID. This could be used by other organizations to access your [place] medical records  [id number]        Your Vitals Were     Temperature Last Period BMI (Body Mass Index)             97.3  F (36.3  C) (Tympanic) 06/06/2017 27.54 kg/m2          Blood Pressure from Last 3 Encounters:   01/03/18 116/62   12/20/17 108/58   11/27/17 118/60    Weight from Last 3 Encounters:   01/03/18 181 lb 2 oz (82.2 kg)   12/20/17 177 lb 6 oz (80.5 kg)   11/27/17 181 lb (82.1 kg)              Today, you had the following     No orders found for display       Primary Care Provider Office Phone # Fax #    Ludy Reza [phone]       [facility] [address]  [facility] [address]        Equal Access to Services     BRAEDENVIJI CLAIR : Hadii aad ku hadaltondonald Soemelyali, walidada luqadaha, qagelyta kasteffetta davisharryetta, sergo wolfemikeltong bojorquez. So [facility] [phone].    ATENCIÓN: Si habla español, tiene a guerrero disposición servicios gratuitos de asistencia lingüística. Llame al [phone].    We comply with applicable federal civil rights laws and [place] laws. We do not discriminate on the basis of race, color, national origin, age, disability, sex, sexual orientation, or gender identity.            Thank you!     Thank you for choosing [facility]  for your care. Our goal is always to provide you with excellent care. Hearing back from our patients is one way we can continue to improve our services. Please take a few minutes to complete the written survey that you may receive in the mail after your visit with us. Thank you!             Your Updated Medication List - Protect others around you: Learn how to safely use, store and throw away your medicines at www.disposemymeds.org.          This list is accurate as of: 1/3/18  3:08 PM.  Always use your most recent med list.                   Brand Name Dispense Instructions for use Diagnosis    acetylcysteine 600 MG Caps capsule    N-ACETYL CYSTEINE    60 capsule    Take 1 capsule (600 mg) by mouth 2 times daily    PCOS (polycystic ovarian syndrome)       fish oil-omega-3 fatty acids 1000 MG capsule      Take 2 g by mouth 2 times daily        PRENATAL FORMULA 27-1 MG Tabs     90 tablet    Take 1 tablet daily    Supervision of other normal pregnancy, antepartum       progesterone 100 MG capsule    PROMETRIUM    60 capsule    TAKE 1 CAPSULE BY MOUTH EVERY NIGHT AT BEDTIME AND INSERT 1 CAPSULE VAGINALLY EVERY NIGHT AT BEDTIME    Supervision of other normal pregnancy, antepartum, Low serum progesterone

## 2018-01-03 NOTE — NURSING NOTE
"Chief Complaint   Patient presents with     Prenatal Care       Initial /62 (BP Location: Right arm, Patient Position: Chair, Cuff Size: Adult Large)  Temp 97.3  F (36.3  C) (Tympanic)  Wt 181 lb 2 oz (82.2 kg)  LMP 06/06/2017  BMI 27.54 kg/m2 Estimated body mass index is 27.54 kg/(m^2) as calculated from the following:    Height as of 11/27/17: 5' 8\" (1.727 m).    Weight as of this encounter: 181 lb 2 oz (82.2 kg).  Medication Reconciliation: complete     Sarah Holman    "

## 2018-01-17 ENCOUNTER — PRENATAL OFFICE VISIT (OUTPATIENT)
Dept: FAMILY MEDICINE | Facility: OTHER | Age: 33
End: 2018-01-17
Attending: FAMILY MEDICINE
Payer: COMMERCIAL

## 2018-01-17 VITALS
TEMPERATURE: 97.3 F | OXYGEN SATURATION: 99 % | RESPIRATION RATE: 18 BRPM | DIASTOLIC BLOOD PRESSURE: 68 MMHG | WEIGHT: 185 LBS | HEIGHT: 68 IN | HEART RATE: 77 BPM | BODY MASS INDEX: 28.04 KG/M2 | SYSTOLIC BLOOD PRESSURE: 120 MMHG

## 2018-01-17 DIAGNOSIS — Z34.80 SUPERVISION OF OTHER NORMAL PREGNANCY, ANTEPARTUM: Primary | ICD-10-CM

## 2018-01-17 LAB
ALBUMIN UR-MCNC: NEGATIVE MG/DL
APPEARANCE UR: CLEAR
BILIRUB UR QL STRIP: NEGATIVE
COLOR UR AUTO: NORMAL
ERYTHROCYTE [DISTWIDTH] IN BLOOD BY AUTOMATED COUNT: 13.1 % (ref 10–15)
GLUCOSE UR STRIP-MCNC: NEGATIVE MG/DL
HCT VFR BLD AUTO: 35 % (ref 35–47)
HGB BLD-MCNC: 12.1 G/DL (ref 11.7–15.7)
HGB UR QL STRIP: NEGATIVE
KETONES UR STRIP-MCNC: NEGATIVE MG/DL
LEUKOCYTE ESTERASE UR QL STRIP: NEGATIVE
MCH RBC QN AUTO: 31.2 PG (ref 26.5–33)
MCHC RBC AUTO-ENTMCNC: 34.6 G/DL (ref 31.5–36.5)
MCV RBC AUTO: 90 FL (ref 78–100)
NITRATE UR QL: NEGATIVE
PH UR STRIP: 6.5 PH (ref 4.7–8)
PLATELET # BLD AUTO: 198 10E9/L (ref 150–450)
RBC # BLD AUTO: 3.88 10E12/L (ref 3.8–5.2)
SOURCE: NORMAL
SP GR UR STRIP: 1.01 (ref 1–1.03)
UROBILINOGEN UR STRIP-MCNC: NORMAL MG/DL (ref 0–2)
WBC # BLD AUTO: 8.2 10E9/L (ref 4–11)

## 2018-01-17 PROCEDURE — 85027 COMPLETE CBC AUTOMATED: CPT | Performed by: FAMILY MEDICINE

## 2018-01-17 PROCEDURE — 36415 COLL VENOUS BLD VENIPUNCTURE: CPT | Performed by: FAMILY MEDICINE

## 2018-01-17 PROCEDURE — 99207 ZZC PRENATAL VISIT: CPT | Performed by: FAMILY MEDICINE

## 2018-01-17 PROCEDURE — 81003 URINALYSIS AUTO W/O SCOPE: CPT | Performed by: FAMILY MEDICINE

## 2018-01-17 ASSESSMENT — ANXIETY QUESTIONNAIRES
1. FEELING NERVOUS, ANXIOUS, OR ON EDGE: NOT AT ALL
3. WORRYING TOO MUCH ABOUT DIFFERENT THINGS: NOT AT ALL
IF YOU CHECKED OFF ANY PROBLEMS ON THIS QUESTIONNAIRE, HOW DIFFICULT HAVE THESE PROBLEMS MADE IT FOR YOU TO DO YOUR WORK, TAKE CARE OF THINGS AT HOME, OR GET ALONG WITH OTHER PEOPLE: NOT DIFFICULT AT ALL
6. BECOMING EASILY ANNOYED OR IRRITABLE: NOT AT ALL
GAD7 TOTAL SCORE: 0
5. BEING SO RESTLESS THAT IT IS HARD TO SIT STILL: NOT AT ALL
2. NOT BEING ABLE TO STOP OR CONTROL WORRYING: NOT AT ALL
7. FEELING AFRAID AS IF SOMETHING AWFUL MIGHT HAPPEN: NOT AT ALL

## 2018-01-17 ASSESSMENT — PAIN SCALES - GENERAL: PAINLEVEL: NO PAIN (0)

## 2018-01-17 ASSESSMENT — PATIENT HEALTH QUESTIONNAIRE - PHQ9
SUM OF ALL RESPONSES TO PHQ QUESTIONS 1-9: 0
5. POOR APPETITE OR OVEREATING: NOT AT ALL

## 2018-01-17 NOTE — MR AVS SNAPSHOT
After Visit Summary   1/17/2018    Viki Busch    MRN: [id number]           Patient Information     Date Of Birth          1985        Visit Information        Provider Department      1/17/2018 2:30 PM Ludy Reza MD [facility] [place]        Today's Diagnoses     Supervision of other normal pregnancy, antepartum    -  1       Follow-ups after your visit        Your next 10 appointments already scheduled     Jan 31, 2018  4:00 PM CST   (Arrive by 3:45 PM)   ESTABLISHED PRENATAL with Ludy Reza MD   [facility] [place] ([facility] [place] )    [address]  [place] [address]   [phone]            Feb 14, 2018  8:45 AM CST   (Arrive by 8:30 AM)   ESTABLISHED PRENATAL with Ludy Reza MD   [facility] [place] ([facility] [place] )    [address]  [place] [address]   [phone]            Feb 21, 2018 10:30 AM CST   (Arrive by 10:15 AM)   ESTABLISHED PRENATAL with Ludy Reza MD   [facility] [place] ([facility] [place] )    [address]  [place] [address]   [phone]            Feb 28, 2018  9:45 AM CST   (Arrive by 9:30 AM)   ESTABLISHED PRENATAL with Ludy Reza MD   [facility] [place] ([facility] [place] )    [address]  [place] [address]   [phone]            Mar 07, 2018  8:30 AM CST   (Arrive by 8:15 AM)   ESTABLISHED PRENATAL with Ludy Reza MD   [facility] [place] ([facility] [place] )    [address]  [place] [address]   [phone]            Mar 14, 2018  8:45 AM CDT   (Arrive by 8:30 AM)   ESTABLISHED PRENATAL with Ludy Reza MD   [facility] [place] ([facility] [place] )    [address]  [place] [address]   [phone]              Who to contact     If you have questions or need follow up information about today's clinic visit or your schedule please contact  "[facility] [place] directly at [phone].  Normal or non-critical lab and imaging results will be communicated to you by MyChart, letter or phone within 4 business days after the clinic has received the results. If you do not hear from us within 7 days, please contact the clinic through [facility] or phone. If you have a critical or abnormal lab result, we will notify you by phone as soon as possible.  Submit refill requests through [employer]Log[employer] or call your pharmacy and they will forward the refill request to us. Please allow 3 business days for your refill to be completed.          Additional Information About Your Visit        [facility] System[facility]As It Is Information     [employer] gives you secure access to your electronic health record. If you see a primary care provider, you can also send messages to your care team and make appointments. If you have questions, please call your primary care clinic.  If you do not have a primary care provider, please call [phone] and they will assist you.        Care EveryWhere ID     This is your Care EveryWhere ID. This could be used by other organizations to access your [place] medical records  [id number]        Your Vitals Were     Pulse Temperature Respirations Height Last Period Pulse Oximetry    77 97.3  F (36.3  C) (Tympanic) 18 5' 8\" (1.727 m) 06/06/2017 99%    BMI (Body Mass Index)                   28.13 kg/m2            Blood Pressure from Last 3 Encounters:   01/17/18 120/68   01/03/18 116/62   12/20/17 108/58    Weight from Last 3 Encounters:   01/17/18 185 lb (83.9 kg)   01/03/18 181 lb 2 oz (82.2 kg)   12/20/17 177 lb 6 oz (80.5 kg)              We Performed the Following     CBC with platelets     UA reflex to Microscopic and Culture        Primary Care Provider Office Phone # Fax #    Ludy Reza [phone]       [facility] [place] [address]  [place] [address]        Equal Access to Services     LORRAINE SELF AH: Liliya Mc, " king patel, kurtybta kamarshall manzano, sergo davis aidenorin lichristy ah. So [facility] [phone].    ATENCIÓN: Si sgla sebastien, tiene a guerrero disposición servicios gratuitos de asistencia lingüística. Paul al [phone].    We comply with applicable federal civil rights laws and [place] laws. We do not discriminate on the basis of race, color, national origin, age, disability, sex, sexual orientation, or gender identity.            Thank you!     Thank you [facility]  for your care. Our goal is always to provide you with excellent care. Hearing back from our patients is one way we can continue to improve our services. Please take a few minutes to complete the written survey that you may receive in the mail after your visit with us. Thank you!             Your Updated Medication List - Protect others around you: Learn how to safely use, store and throw away your medicines at www.disposemymeds.org.          This list is accurate as of: 1/17/18  3:26 PM.  Always use your most recent med list.                   Brand Name Dispense Instructions for use Diagnosis    acetylcysteine 600 MG Caps capsule    N-ACETYL CYSTEINE    60 capsule    Take 1 capsule (600 mg) by mouth 2 times daily    PCOS (polycystic ovarian syndrome)       fish oil-omega-3 fatty acids 1000 MG capsule      Take 2 g by mouth 2 times daily        PRENATAL FORMULA 27-1 MG Tabs     90 tablet    Take 1 tablet daily    Supervision of other normal pregnancy, antepartum       progesterone 100 MG capsule    PROMETRIUM    60 capsule    TAKE 1 CAPSULE BY MOUTH EVERY NIGHT AT BEDTIME AND INSERT 1 CAPSULE VAGINALLY EVERY NIGHT AT BEDTIME    Supervision of other normal pregnancy, antepartum, Low serum progesterone

## 2018-01-17 NOTE — NURSING NOTE
"Chief Complaint   Patient presents with     Prenatal Care       Initial /68 (BP Location: Left arm, Patient Position: Sitting, Cuff Size: Adult Regular)  Pulse 77  Temp 97.3  F (36.3  C) (Tympanic)  Resp 18  Ht 5' 8\" (1.727 m)  Wt 185 lb (83.9 kg)  LMP 06/06/2017  SpO2 99%  BMI 28.13 kg/m2 Estimated body mass index is 28.13 kg/(m^2) as calculated from the following:    Height as of this encounter: 5' 8\" (1.727 m).    Weight as of this encounter: 185 lb (83.9 kg).  Medication Reconciliation: complete   Starla Sow MA  "

## 2018-01-19 ASSESSMENT — ANXIETY QUESTIONNAIRES: GAD7 TOTAL SCORE: 0

## 2018-01-31 ENCOUNTER — PRENATAL OFFICE VISIT (OUTPATIENT)
Dept: FAMILY MEDICINE | Facility: OTHER | Age: 33
End: 2018-01-31
Attending: FAMILY MEDICINE
Payer: COMMERCIAL

## 2018-01-31 VITALS
HEIGHT: 68 IN | OXYGEN SATURATION: 98 % | HEART RATE: 71 BPM | SYSTOLIC BLOOD PRESSURE: 114 MMHG | BODY MASS INDEX: 28.61 KG/M2 | WEIGHT: 188.8 LBS | DIASTOLIC BLOOD PRESSURE: 64 MMHG

## 2018-01-31 DIAGNOSIS — Z34.80 SUPERVISION OF OTHER NORMAL PREGNANCY, ANTEPARTUM: Primary | ICD-10-CM

## 2018-01-31 PROCEDURE — 99207 ZZC PRENATAL VISIT: CPT | Performed by: FAMILY MEDICINE

## 2018-01-31 ASSESSMENT — PAIN SCALES - GENERAL: PAINLEVEL: NO PAIN (0)

## 2018-01-31 NOTE — MR AVS SNAPSHOT
After Visit Summary   1/31/2018    Viki Busch    MRN: [id number]           Patient Information     Date Of Birth          1985        Visit Information        Provider Department      1/31/2018 4:00 PM Ludy Reza MD [facility] [place]        Today's Diagnoses     Supervision of other normal pregnancy, antepartum    -  1       Follow-ups after your visit        Your next 10 appointments already scheduled     Feb 14, 2018  8:45 AM CST   (Arrive by 8:30 AM)   [facility] [place] [facility] [place] [facility]    [address]  [place] [address]   [phone]            Feb 21, 2018 10:30 AM CST   (Arrive by 10:15 AM)   ESTABLISHED PRENATAL with Ludy Reza MD   [facility] [place] ([facility] [place] )    [address]  [place] [address]   [phone]            Feb 28, 2018  9:45 AM CST   (Arrive by 9:30 AM)   ESTABLISHED PRENATAL with Ludy Reza MD   [facility] [place] ([facility] [place] )    [address]  [place] [address]   [phone]            Mar 07, 2018  8:30 AM CST   (Arrive by 8:15 AM)   ESTABLISHED PRENATAL with Ludy Reza MD   [facility] [place] ([facility] [place] )    [address]  [place] [address]   [phone]            Mar 14, 2018  8:45 AM CDT   (Arrive by 8:30 AM)   ESTABLISHED PRENATAL with Ludy Reza MD   [facility] [place] ([facility] [place] )    [address]  [place] [address]   [phone]              Who to contact     If you have questions or need follow up information about today's clinic visit or your schedule please contact [facility] [place] directly at [phone].  Normal or non-critical lab and imaging results will be communicated to you by MyChart, letter or phone within 4 business days after the clinic has received the results. If you do  "not hear from us within 7 days, please contact the clinic through [employer] or phone. If you have a critical or abnormal lab result, we will notify you by phone as soon as possible.  Submit refill requests through OpenDNS or call your pharmacy and they will forward the refill request to us. Please allow 3 business days for your refill to be completed.          Additional Information About Your Visit        RACTIVhart Information     OpenDNS gives you secure access to your electronic health record. If you see a primary care provider, you can also send messages to your care team and make appointments. If you have questions, please call your primary care clinic.  If you do not have a primary care provider, please call [phone] and they will assist you.        Care EveryWhere ID     This is your Care EveryWhere ID. This could be used by other organizations to access your [place] medical records  [id number]        Your Vitals Were     Pulse Height Last Period Pulse Oximetry BMI (Body Mass Index)       71 5' 8\" (1.727 m) 06/06/2017 98% 28.71 kg/m2        Blood Pressure from Last 3 Encounters:   01/31/18 114/64   01/17/18 120/68   01/03/18 116/62    Weight from Last 3 Encounters:   01/31/18 188 lb 12.8 oz (85.6 kg)   01/17/18 185 lb (83.9 kg)   01/03/18 181 lb 2 oz (82.2 kg)              Today, you had the following     No orders found for display       Primary Care Provider Office Phone # Fax #    Ludy CONCEPCION Reza [phone]       [facility] [place] [address]  [place] [address]        Equal Access to Services     [facility] AH: Hadii aad ku hadasho Soomaali, waaxda luqadaha, qaybta kaalmada adeegyada, sergo bojorquez. So [facility] [phone].    ATENCIÓN: Si habla español, tiene a guerrero disposición servicios gratuitos de asistencia lingüística. Llame al [phone].    We comply with applicable federal civil rights laws and [place] laws. We do not discriminate on the " basis of race, color, national origin, age, disability, sex, sexual orientation, or gender identity.            Thank you!     Thank you for choosing [facility] [place]  for your care. Our goal is always to provide you with excellent care. Hearing back from our patients is one way we can continue to improve our services. Please take a few minutes to complete the written survey that you may receive in the mail after your visit with us. Thank you!             Your Updated Medication List - Protect others around you: Learn how to safely use, store and throw away your medicines at www.disposemymeds.org.          This list is accurate as of 1/31/18  5:03 PM.  Always use your most recent med list.                   Brand Name Dispense Instructions for use Diagnosis    acetylcysteine 600 MG Caps capsule    N-ACETYL CYSTEINE    60 capsule    Take 1 capsule (600 mg) by mouth 2 times daily    PCOS (polycystic ovarian syndrome)       fish oil-omega-3 fatty acids 1000 MG capsule      Take 2 g by mouth 2 times daily        PRENATAL FORMULA 27-1 MG Tabs     90 tablet    Take 1 tablet daily    Supervision of other normal pregnancy, antepartum       progesterone 100 MG capsule    PROMETRIUM    60 capsule    TAKE 1 CAPSULE BY MOUTH EVERY NIGHT AT BEDTIME AND INSERT 1 CAPSULE VAGINALLY EVERY NIGHT AT BEDTIME    Supervision of other normal pregnancy, antepartum, Low serum progesterone

## 2018-01-31 NOTE — NURSING NOTE
"Chief Complaint   Patient presents with     Prenatal Care     34 weeks 1 day       Initial /64  Pulse 71  Ht 5' 8\" (1.727 m)  Wt 188 lb 12.8 oz (85.6 kg)  LMP 06/06/2017  SpO2 98%  BMI 28.71 kg/m2 Estimated body mass index is 28.71 kg/(m^2) as calculated from the following:    Height as of this encounter: 5' 8\" (1.727 m).    Weight as of this encounter: 188 lb 12.8 oz (85.6 kg).  Medication Reconciliation: complete     Krystina Alegria      "

## 2018-02-14 ENCOUNTER — PRENATAL OFFICE VISIT (OUTPATIENT)
Dept: FAMILY MEDICINE | Facility: OTHER | Age: 33
End: 2018-02-14
Attending: FAMILY MEDICINE
Payer: COMMERCIAL

## 2018-02-14 VITALS
OXYGEN SATURATION: 98 % | SYSTOLIC BLOOD PRESSURE: 120 MMHG | HEART RATE: 73 BPM | TEMPERATURE: 97.5 F | WEIGHT: 187 LBS | DIASTOLIC BLOOD PRESSURE: 62 MMHG | RESPIRATION RATE: 18 BRPM | HEIGHT: 68 IN | BODY MASS INDEX: 28.34 KG/M2

## 2018-02-14 DIAGNOSIS — Z34.80 SUPERVISION OF OTHER NORMAL PREGNANCY, ANTEPARTUM: Primary | ICD-10-CM

## 2018-02-14 DIAGNOSIS — Z23 NEED FOR PROPHYLACTIC VACCINATION AND INOCULATION AGAINST INFLUENZA: ICD-10-CM

## 2018-02-14 PROCEDURE — 99207 ZZC PRENATAL VISIT: CPT | Performed by: FAMILY MEDICINE

## 2018-02-14 PROCEDURE — 90471 IMMUNIZATION ADMIN: CPT | Performed by: FAMILY MEDICINE

## 2018-02-14 PROCEDURE — 90686 IIV4 VACC NO PRSV 0.5 ML IM: CPT | Performed by: FAMILY MEDICINE

## 2018-02-14 PROCEDURE — 87653 STREP B DNA AMP PROBE: CPT | Performed by: FAMILY MEDICINE

## 2018-02-14 RX ORDER — ERGOCALCIFEROL 1.25 MG/1
50000 CAPSULE, LIQUID FILLED ORAL
Qty: 4 CAPSULE | Refills: 1 | Status: SHIPPED | OUTPATIENT
Start: 2018-02-14 | End: 2018-04-05

## 2018-02-14 ASSESSMENT — PAIN SCALES - GENERAL: PAINLEVEL: NO PAIN (0)

## 2018-02-14 NOTE — NURSING NOTE
"Chief Complaint   Patient presents with     Prenatal Care       Initial /62 (BP Location: Right arm, Patient Position: Chair, Cuff Size: Adult Regular)  Pulse 73  Temp 97.5  F (36.4  C) (Tympanic)  Resp 18  Ht 5' 8\" (1.727 m)  Wt 187 lb (84.8 kg)  LMP 06/06/2017  SpO2 98%  BMI 28.43 kg/m2 Estimated body mass index is 28.43 kg/(m^2) as calculated from the following:    Height as of this encounter: 5' 8\" (1.727 m).    Weight as of this encounter: 187 lb (84.8 kg).  Medication Reconciliation: complete   Marah Hernandes    "

## 2018-02-14 NOTE — PROGRESS NOTES
Flu shot today, GBs today  Discussed family getting flu shots also  Vitamin d rx for some mood lability

## 2018-02-14 NOTE — MR AVS SNAPSHOT
After Visit Summary   2/14/2018    Viki Busch    MRN: [id number]           Patient Information     Date Of Birth          1985        Visit Information        Provider Department      2/14/2018 8:45 AM [facility] MD [facility]        Today's Diagnoses     Supervision of other normal pregnancy, antepartum    -  1    Need for prophylactic vaccination and inoculation against influenza           Follow-ups after your visit        Your next 10 appointments already scheduled     Feb 21, 2018 10:30 AM CST   (Arrive by 10:15 AM)   ESTABLISHED PRENATAL with Ludy Reza MD   [facility] [place] ([facility] [place] [facility]    [address]  [place] [address]   [phone]            Feb 28, 2018  9:45 AM CST   (Arrive by 9:30 AM)   ESTABLISHED PRENATAL with Ludy Reza MD   [facility] [place] ([facility] [place] [facility]    [address]  [place] [address]   [phone]            Mar 07, 2018  8:30 AM CST   (Arrive by 8:15 AM)   ESTABLISHED PRENATAL with Ludy Reza MD   [facility] [place] ([facility] [place] [facility]    [address]  [place] [address]   [phone]            Mar 14, 2018  8:45 AM CDT   (Arrive by 8:30 AM)   ESTABLISHED PRENATAL with Ludy Reza MD   [facility] [place] ([facility] [place] [facility]    [address]  [place] [address]   [phone]              Who to contact     If you have questions or need follow up information about today's clinic visit or your schedule please contact [facility] directly at [phone].  Normal or non-critical lab and imaging results will be communicated to you by MyChart, letter or phone within 4 business days after the clinic has received the results. If you do not hear from us within 7 days, please contact the clinic through MyChart or phone. If you have a critical or abnormal lab result, we will notify you  "by phone as soon as possible.  Submit refill requests through [employer] or call your pharmacy and they will forward the refill request to us. Please allow 3 business days for your refill to be completed.          Additional Information About Your Visit        [employer] Information     [employer] gives you secure access to your electronic health record. If you see a primary care provider, you can also send messages to your care team and make appointments. If you have questions, please call your primary care clinic.  If you do not have a primary care provider, please call [phone] and they will assist you.        Care EveryWhere ID     This is your Care EveryWhere ID. This could be used by other organizations to access your [place] medical records  [id number]        Your Vitals Were     Pulse Temperature Respirations Height Last Period Pulse Oximetry    73 97.5  F (36.4  C) (Tympanic) 18 5' 8\" (1.727 m) 06/06/2017 98%    BMI (Body Mass Index)                   28.43 kg/m2            Blood Pressure from Last 3 Encounters:   02/14/18 120/62   01/31/18 114/64   01/17/18 120/68    Weight from Last 3 Encounters:   02/14/18 187 lb (84.8 kg)   01/31/18 188 lb 12.8 oz (85.6 kg)   01/17/18 185 lb (83.9 kg)              We Performed the Following     ADMIN INFLUENZA (For MEDICARE Patients ONLY) []     HC FLU VAC PRESRV FREE QUAD SPLIT VIR 3+YRS IM     Strep, Group B by PCR          Today's Medication Changes          These changes are accurate as of 2/14/18  9:36 AM.  If you have any questions, ask your nurse or doctor.               Start taking these medicines.        Dose/Directions    vitamin D 12068 UNIT capsule   Commonly known as:  ERGOCALCIFEROL   Used for:  Supervision of other normal pregnancy, antepartum   Started by:  Ludy Reza MD        Dose:  39036 Units   Take 1 capsule (50,000 Units) by mouth every 7 days for 8 doses   Quantity:  4 capsule   Refills:  1            Where to get your medicines    "   These medications were sent to [id number] [place] - [address] AT [facility] [address]  [address], [place] [address]     Phone:  [phone]     vitamin D 42848 UNIT capsule                Primary Care Provider Office Phone # Fax #    Ludy CONCEPCION Reza [phone]       [facility] [place] [address]  [facility] [address]        Equal Access to Services     VIJI SELF : Hadii aad ku hadasho Soomaali, waaxda luqadaha, qaybta kaalmada adeegyada, waxay idiin hayaan adeeg kharatong lakerry . So [facility] [phone].    ATENCIÓN: Si habla español, tiene a guerrero disposición servicios gratuitos de asistencia lingüística. [facility] [phone].    We comply with applicable federal civil rights laws and [place] laws. We do not discriminate on the basis of race, color, national origin, age, disability, sex, sexual orientation, or gender identity.            Thank you!     Thank you for choosing [facility]  for your care. Our goal is always to provide you with excellent care. Hearing back from our patients is one way we can continue to improve our services. Please take a few minutes to complete the written survey that you may receive in the mail after your visit with us. Thank you!             Your Updated Medication List - Protect others around you: Learn how to safely use, store and throw away your medicines at www.disposemymeds.org.          This list is accurate as of 2/14/18  9:36 AM.  Always use your most recent med list.                   Brand Name Dispense Instructions for use Diagnosis    acetylcysteine 600 MG Caps capsule    N-ACETYL CYSTEINE    60 capsule    Take 1 capsule (600 mg) by mouth 2 times daily    PCOS (polycystic ovarian syndrome)       fish oil-omega-3 fatty acids 1000 MG capsule      Take 2 g by mouth 2 times daily        PRENATAL FORMULA 27-1 MG Tabs     90 tablet    Take 1 tablet daily    Supervision of other normal pregnancy,  antepartum       progesterone 100 MG capsule    PROMETRIUM    60 capsule    TAKE 1 CAPSULE BY MOUTH EVERY NIGHT AT BEDTIME AND INSERT 1 CAPSULE VAGINALLY EVERY NIGHT AT BEDTIME    Supervision of other normal pregnancy, antepartum, Low serum progesterone       vitamin D 50593 UNIT capsule    ERGOCALCIFEROL    4 capsule    Take 1 capsule (50,000 Units) by mouth every 7 days for 8 doses    Supervision of other normal pregnancy, antepartum

## 2018-02-14 NOTE — PROGRESS NOTES

## 2018-02-15 LAB
GP B STREP DNA SPEC QL NAA+PROBE: NEGATIVE
SPECIMEN SOURCE: NORMAL

## 2018-02-21 ENCOUNTER — PRENATAL OFFICE VISIT (OUTPATIENT)
Dept: FAMILY MEDICINE | Facility: OTHER | Age: 33
End: 2018-02-21
Attending: FAMILY MEDICINE
Payer: COMMERCIAL

## 2018-02-21 VITALS
HEIGHT: 68 IN | TEMPERATURE: 98.1 F | HEART RATE: 68 BPM | WEIGHT: 195.8 LBS | BODY MASS INDEX: 29.67 KG/M2 | RESPIRATION RATE: 16 BRPM | OXYGEN SATURATION: 98 % | SYSTOLIC BLOOD PRESSURE: 110 MMHG | DIASTOLIC BLOOD PRESSURE: 70 MMHG

## 2018-02-21 DIAGNOSIS — Z34.80 SUPERVISION OF OTHER NORMAL PREGNANCY, ANTEPARTUM: Primary | ICD-10-CM

## 2018-02-21 PROCEDURE — 99207 ZZC PRENATAL VISIT: CPT | Performed by: FAMILY MEDICINE

## 2018-02-21 ASSESSMENT — ANXIETY QUESTIONNAIRES
2. NOT BEING ABLE TO STOP OR CONTROL WORRYING: NOT AT ALL
1. FEELING NERVOUS, ANXIOUS, OR ON EDGE: NOT AT ALL
6. BECOMING EASILY ANNOYED OR IRRITABLE: NOT AT ALL
4. TROUBLE RELAXING: NOT AT ALL
5. BEING SO RESTLESS THAT IT IS HARD TO SIT STILL: NOT AT ALL
GAD7 TOTAL SCORE: 0
7. FEELING AFRAID AS IF SOMETHING AWFUL MIGHT HAPPEN: NOT AT ALL
3. WORRYING TOO MUCH ABOUT DIFFERENT THINGS: NOT AT ALL

## 2018-02-21 ASSESSMENT — PAIN SCALES - GENERAL: PAINLEVEL: NO PAIN (0)

## 2018-02-21 NOTE — NURSING NOTE
"Chief Complaint   Patient presents with     Prenatal Care       Initial /70 (BP Location: Left arm, Patient Position: Sitting, Cuff Size: Adult Regular)  Pulse 68  Temp 98.1  F (36.7  C) (Tympanic)  Resp 16  Ht 5' 8\" (1.727 m)  Wt 195 lb 12.8 oz (88.8 kg)  LMP 06/06/2017  SpO2 98%  BMI 29.77 kg/m2 Estimated body mass index is 29.77 kg/(m^2) as calculated from the following:    Height as of this encounter: 5' 8\" (1.727 m).    Weight as of this encounter: 195 lb 12.8 oz (88.8 kg).  Medication Reconciliation: complete   Sujata Edwards    "

## 2018-02-21 NOTE — MR AVS SNAPSHOT
After Visit Summary   2/21/2018    Viki Busch    MRN: [id number]           Patient Information     Date Of Birth          1985        Visit Information        Provider Department      2/21/2018 10:30 AM Ludy Reza MD [facility]        Today's Diagnoses     Supervision of other normal pregnancy, antepartum    -  1       Follow-ups after your visit        Your next 10 appointments already scheduled     Feb 28, 2018  9:45 AM CST   (Arrive by 9:30 AM)   ESTABLISHED PRENATAL with Ludy Reza MD   [facility] [place] [facility]    [address]  [place] [address]   [phone]            Mar 07, 2018  8:30 AM CST   (Arrive by 8:15 AM)   ESTABLISHED PRENATAL with Ludy Reza MD   [facility] ([facility] [place] [facility]    [address]  [place] [address]   [phone]            Mar 14, 2018  8:45 AM CDT   (Arrive by 8:30 AM)   ESTABLISHED PRENATAL with Ludy Reza MD   [facility] ([facility] [place] )    [address]  [place] [address]   [phone]              Who to contact     If you have questions or need follow up information about today's clinic visit or your schedule please contact [facility] directly at [phone].  Normal or non-critical lab and imaging results will be communicated to you by MyChart, letter or phone within 4 business days after the clinic has received the results. If you do not hear from us within 7 days, please contact the clinic through MyChart or phone. If you have a critical or abnormal lab result, we will notify you by phone as soon as possible.  Submit refill requests through [employer] or call your pharmacy and they will forward the refill request to us. Please allow 3 business days for your refill to be completed.          Additional Information About Your Visit        MyChart Information     [facility] gives you  "secure access to your electronic health record. If you see a primary care provider, you can also send messages to your care team and make appointments. If you have questions, please call your primary care clinic.  If you do not have a primary care provider, please call [phone] and they will assist you.        Care EveryWhere ID     This is your Care EveryWhere ID. This could be used by other organizations to access your [place] medical records  [id number]        Your Vitals Were     Pulse Temperature Respirations Height Last Period Pulse Oximetry    68 98.1  F (36.7  C) (Tympanic) 16 5' 8\" (1.727 m) 06/06/2017 98%    BMI (Body Mass Index)                   29.77 kg/m2            Blood Pressure from Last 3 Encounters:   02/21/18 110/70   02/14/18 120/62   01/31/18 114/64    Weight from Last 3 Encounters:   02/21/18 195 lb 12.8 oz (88.8 kg)   02/14/18 187 lb (84.8 kg)   01/31/18 188 lb 12.8 oz (85.6 kg)              Today, you had the following     No orders found for display       Primary Care Provider Office Phone # Fax #    Ludy JAVIER MD [place] [phone]       [facility] [place] [address]  [place] [address]        Equal Access to Services     VIJI SELF : Hadii aad ku hadasho Soomaali, waaxda luqadaha, qaybta kaalmada adeegyada, waxay idiin hayaan adegeri khorin la'deloris . So [facility] [phone].    ATENCIÓN: Si habla español, tiene a guerrero disposición servicios gratuitos de asistencia lingüística. Llame al [phone].    We comply with applicable federal civil rights laws and [place] laws. We do not discriminate on the basis of race, color, national origin, age, disability, sex, sexual orientation, or gender identity.            Thank you!     Thank you for choosing [facility] [place]  for your care. Our goal is always to provide you with excellent care. Hearing back from our patients is one way we can continue to improve our services. Please take a few minutes to complete " the written survey that you may receive in the mail after your visit with us. Thank you!             Your Updated Medication List - Protect others around you: Learn how to safely use, store and throw away your medicines at www.disposemymeds.org.          This list is accurate as of 2/21/18 12:37 PM.  Always use your most recent med list.                   Brand Name Dispense Instructions for use Diagnosis    acetylcysteine 600 MG Caps capsule    N-ACETYL CYSTEINE    60 capsule    Take 1 capsule (600 mg) by mouth 2 times daily    PCOS (polycystic ovarian syndrome)       fish oil-omega-3 fatty acids 1000 MG capsule      Take 2 g by mouth 2 times daily        PRENATAL FORMULA 27-1 MG Tabs     90 tablet    Take 1 tablet daily    Supervision of other normal pregnancy, antepartum       progesterone 100 MG capsule    PROMETRIUM    60 capsule    TAKE 1 CAPSULE BY MOUTH EVERY NIGHT AT BEDTIME AND INSERT 1 CAPSULE VAGINALLY EVERY NIGHT AT BEDTIME    Supervision of other normal pregnancy, antepartum, Low serum progesterone       vitamin D 49349 UNIT capsule    ERGOCALCIFEROL    4 capsule    Take 1 capsule (50,000 Units) by mouth every 7 days for 8 doses    Supervision of other normal pregnancy, antepartum

## 2018-02-22 ASSESSMENT — PATIENT HEALTH QUESTIONNAIRE - PHQ9: SUM OF ALL RESPONSES TO PHQ QUESTIONS 1-9: 0

## 2018-02-22 ASSESSMENT — ANXIETY QUESTIONNAIRES: GAD7 TOTAL SCORE: 0

## 2018-02-28 ENCOUNTER — PRENATAL OFFICE VISIT (OUTPATIENT)
Dept: FAMILY MEDICINE | Facility: OTHER | Age: 33
End: 2018-02-28
Attending: FAMILY MEDICINE
Payer: COMMERCIAL

## 2018-02-28 VITALS
TEMPERATURE: 98 F | OXYGEN SATURATION: 99 % | WEIGHT: 196.6 LBS | SYSTOLIC BLOOD PRESSURE: 110 MMHG | BODY MASS INDEX: 29.8 KG/M2 | DIASTOLIC BLOOD PRESSURE: 68 MMHG | HEIGHT: 68 IN | RESPIRATION RATE: 16 BRPM | HEART RATE: 81 BPM

## 2018-02-28 DIAGNOSIS — Z34.80 SUPERVISION OF OTHER NORMAL PREGNANCY, ANTEPARTUM: Primary | ICD-10-CM

## 2018-02-28 PROCEDURE — 99207 ZZC PRENATAL VISIT: CPT | Performed by: FAMILY MEDICINE

## 2018-02-28 ASSESSMENT — ANXIETY QUESTIONNAIRES
6. BECOMING EASILY ANNOYED OR IRRITABLE: NOT AT ALL
7. FEELING AFRAID AS IF SOMETHING AWFUL MIGHT HAPPEN: NOT AT ALL
5. BEING SO RESTLESS THAT IT IS HARD TO SIT STILL: NOT AT ALL
1. FEELING NERVOUS, ANXIOUS, OR ON EDGE: NOT AT ALL
3. WORRYING TOO MUCH ABOUT DIFFERENT THINGS: NOT AT ALL
4. TROUBLE RELAXING: NOT AT ALL
2. NOT BEING ABLE TO STOP OR CONTROL WORRYING: NOT AT ALL
GAD7 TOTAL SCORE: 0

## 2018-02-28 ASSESSMENT — PAIN SCALES - GENERAL: PAINLEVEL: NO PAIN (0)

## 2018-02-28 NOTE — NURSING NOTE
"Chief Complaint   Patient presents with     Prenatal Care       Initial /68 (BP Location: Left arm, Patient Position: Sitting, Cuff Size: Adult Regular)  Pulse 81  Temp 98  F (36.7  C) (Tympanic)  Resp 16  Ht 5' 8\" (1.727 m)  Wt 196 lb 9.6 oz (89.2 kg)  LMP 06/06/2017  SpO2 99%  BMI 29.89 kg/m2 Estimated body mass index is 29.89 kg/(m^2) as calculated from the following:    Height as of this encounter: 5' 8\" (1.727 m).    Weight as of this encounter: 196 lb 9.6 oz (89.2 kg).  Medication Reconciliation: complete   Sujata Edwards    "

## 2018-02-28 NOTE — PROGRESS NOTES
Dong well, having 5 or more ctxs per day, mild  Discussed induction if necessary but I think she will go before that.

## 2018-02-28 NOTE — MR AVS SNAPSHOT
After Visit Summary   2/28/2018    Viki Busch    MRN: [id number]           Patient Information     Date Of Birth          1985        Visit Information        Provider Department      2/28/2018 9:45 AM Ludy Reza MD [facility]        Today's Diagnoses     Supervision of other normal pregnancy, antepartum    -  1       Follow-ups after your visit        Your next 10 appointments already scheduled     Mar 07, 2018  8:30 AM CST   (Arrive by 8:15 AM)   ESTABLISHED PRENATAL with Ludy Reza MD   [facility] [place] [facility]    [address]  [place] [address]   [phone]            Mar 14, 2018  8:45 AM CDT   (Arrive by 8:30 AM)   ESTABLISHED PRENATAL with Ludy Reza MD   [facility] ([facility] [place] )    [address]  [place] [address]   [phone]              Who to contact     If you have questions or need follow up information about today's clinic visit or your schedule please contact [facility] directly at [phone].  Normal or non-critical lab and imaging results will be communicated to you by MyChart, letter or phone within 4 business days after the clinic has received the results. If you do not hear from us within 7 days, please contact the clinic through [employer] or phone. If you have a critical or abnormal lab result, we will notify you by phone as soon as possible.  Submit refill requests through [employer] or call your pharmacy and they will forward the refill request to us. Please allow 3 business days for your refill to be completed.          Additional Information About Your Visit        MyChart Information     [employer] gives you secure access to your electronic health record. If you see a primary care provider, you can also send messages to your care team and make appointments. If you have questions, please call your primary care clinic.  If you do not  "have a primary care provider, please call [phone] and they will assist you.        Care EveryWhere ID     This is your Care EveryWhere ID. This could be used by other organizations to access your [place] medical records  [id number]        Your Vitals Were     Pulse Temperature Respirations Height Last Period Pulse Oximetry    81 98  F (36.7  C) (Tympanic) 16 5' 8\" (1.727 m) 06/06/2017 99%    BMI (Body Mass Index)                   29.89 kg/m2            Blood Pressure from Last 3 Encounters:   02/28/18 110/68   02/21/18 110/70   02/14/18 120/62    Weight from Last 3 Encounters:   02/28/18 196 lb 9.6 oz (89.2 kg)   02/21/18 195 lb 12.8 oz (88.8 kg)   02/14/18 187 lb (84.8 kg)              Today, you had the following     No orders found for display       Primary Care Provider Office Phone # Fax #    Ludy Reza [phone]       [facility] [place] [address] [address]        Equal Access to Services     [facility] AH: Hadii aad ku hadasho Soomaali, waaxda luqadaha, qaybta kaalmada adeegyada, sergo kearney hayvandanan ryan grossman . So [facility] [phone].    ATENCIÓN: Si habla español, tiene a guerrero disposición servicios gratuitos de asistencia lingüística. [facility] [phone].    We comply with applicable federal civil rights laws and [place] laws. We do not discriminate on the basis of race, color, national origin, age, disability, sex, sexual orientation, or gender identity.            Thank you!     Thank you for choosing [facility]  for your care. Our goal is always to provide you with excellent care. Hearing back from our patients is one way we can continue to improve our services. Please take a few minutes to complete the written survey that you may receive in the mail after your visit with us. Thank you!             Your Updated Medication List - Protect others around you: Learn how to safely use, store and throw away your medicines at " www.disposemymeds.org.          This list is accurate as of 2/28/18 10:05 AM.  Always use your most recent med list.                   Brand Name Dispense Instructions for use Diagnosis    acetylcysteine 600 MG Caps capsule    N-ACETYL CYSTEINE    60 capsule    Take 1 capsule (600 mg) by mouth 2 times daily    PCOS (polycystic ovarian syndrome)       fish oil-omega-3 fatty acids 1000 MG capsule      Take 2 g by mouth 2 times daily        PRENATAL FORMULA 27-1 MG Tabs     90 tablet    Take 1 tablet daily    Supervision of other normal pregnancy, antepartum       progesterone 100 MG capsule    PROMETRIUM    60 capsule    TAKE 1 CAPSULE BY MOUTH EVERY NIGHT AT BEDTIME AND INSERT 1 CAPSULE VAGINALLY EVERY NIGHT AT BEDTIME    Supervision of other normal pregnancy, antepartum, Low serum progesterone       vitamin D 07556 UNIT capsule    ERGOCALCIFEROL    4 capsule    Take 1 capsule (50,000 Units) by mouth every 7 days for 8 doses    Supervision of other normal pregnancy, antepartum

## 2018-03-01 ASSESSMENT — ANXIETY QUESTIONNAIRES: GAD7 TOTAL SCORE: 0

## 2018-03-01 ASSESSMENT — PATIENT HEALTH QUESTIONNAIRE - PHQ9: SUM OF ALL RESPONSES TO PHQ QUESTIONS 1-9: 0

## 2018-03-07 ENCOUNTER — PRENATAL OFFICE VISIT (OUTPATIENT)
Dept: FAMILY MEDICINE | Facility: OTHER | Age: 33
End: 2018-03-07
Attending: FAMILY MEDICINE
Payer: COMMERCIAL

## 2018-03-07 VITALS
SYSTOLIC BLOOD PRESSURE: 110 MMHG | WEIGHT: 195.25 LBS | DIASTOLIC BLOOD PRESSURE: 72 MMHG | BODY MASS INDEX: 29.69 KG/M2 | TEMPERATURE: 97.1 F

## 2018-03-07 DIAGNOSIS — Z34.80 SUPERVISION OF OTHER NORMAL PREGNANCY, ANTEPARTUM: Primary | ICD-10-CM

## 2018-03-07 PROCEDURE — 99207 ZZC PRENATAL VISIT: CPT | Performed by: FAMILY MEDICINE

## 2018-03-07 ASSESSMENT — ANXIETY QUESTIONNAIRES
1. FEELING NERVOUS, ANXIOUS, OR ON EDGE: NOT AT ALL
3. WORRYING TOO MUCH ABOUT DIFFERENT THINGS: NOT AT ALL
7. FEELING AFRAID AS IF SOMETHING AWFUL MIGHT HAPPEN: NOT AT ALL
6. BECOMING EASILY ANNOYED OR IRRITABLE: NOT AT ALL
GAD7 TOTAL SCORE: 0
5. BEING SO RESTLESS THAT IT IS HARD TO SIT STILL: NOT AT ALL
4. TROUBLE RELAXING: NOT AT ALL
2. NOT BEING ABLE TO STOP OR CONTROL WORRYING: NOT AT ALL

## 2018-03-07 ASSESSMENT — PAIN SCALES - GENERAL: PAINLEVEL: NO PAIN (0)

## 2018-03-07 NOTE — NURSING NOTE
"Chief Complaint   Patient presents with     Prenatal Care       Initial /72  Temp 97.1  F (36.2  C) (Tympanic)  Wt 195 lb 4 oz (88.6 kg)  LMP 06/06/2017  BMI 29.69 kg/m2 Estimated body mass index is 29.69 kg/(m^2) as calculated from the following:    Height as of 2/28/18: 5' 8\" (1.727 m).    Weight as of this encounter: 195 lb 4 oz (88.6 kg).  Medication Reconciliation: complete     Sarah Holman    "

## 2018-03-08 ASSESSMENT — PATIENT HEALTH QUESTIONNAIRE - PHQ9: SUM OF ALL RESPONSES TO PHQ QUESTIONS 1-9: 0

## 2018-03-08 ASSESSMENT — ANXIETY QUESTIONNAIRES: GAD7 TOTAL SCORE: 0

## 2018-03-14 ENCOUNTER — HOSPITAL ENCOUNTER (OUTPATIENT)
Facility: HOSPITAL | Age: 33
Discharge: HOME | End: 2018-03-14
Attending: FAMILY MEDICINE | Admitting: FAMILY MEDICINE
Payer: COMMERCIAL

## 2018-03-14 ENCOUNTER — PRENATAL OFFICE VISIT (OUTPATIENT)
Dept: FAMILY MEDICINE | Facility: OTHER | Age: 33
End: 2018-03-14
Attending: FAMILY MEDICINE
Payer: COMMERCIAL

## 2018-03-14 ENCOUNTER — HOSPITAL ENCOUNTER (INPATIENT)
Facility: HOSPITAL | Age: 33
LOS: 2 days | Discharge: HOME | End: 2018-03-16
Attending: FAMILY MEDICINE | Admitting: FAMILY MEDICINE
Payer: COMMERCIAL

## 2018-03-14 VITALS
HEART RATE: 72 BPM | TEMPERATURE: 97.3 F | RESPIRATION RATE: 19 BRPM | DIASTOLIC BLOOD PRESSURE: 77 MMHG | SYSTOLIC BLOOD PRESSURE: 122 MMHG

## 2018-03-14 VITALS
WEIGHT: 197.2 LBS | TEMPERATURE: 97.8 F | SYSTOLIC BLOOD PRESSURE: 124 MMHG | HEIGHT: 68 IN | BODY MASS INDEX: 29.89 KG/M2 | HEART RATE: 73 BPM | DIASTOLIC BLOOD PRESSURE: 74 MMHG | RESPIRATION RATE: 20 BRPM | OXYGEN SATURATION: 100 %

## 2018-03-14 DIAGNOSIS — Z34.80 SUPERVISION OF OTHER NORMAL PREGNANCY, ANTEPARTUM: Primary | ICD-10-CM

## 2018-03-14 PROBLEM — Z36.89 ENCOUNTER FOR TRIAGE IN PREGNANT PATIENT: Status: ACTIVE | Noted: 2018-03-14

## 2018-03-14 LAB
ABO + RH BLD: NORMAL
ABO + RH BLD: NORMAL
SPECIMEN EXP DATE BLD: NORMAL

## 2018-03-14 PROCEDURE — 86900 BLOOD TYPING SEROLOGIC ABO: CPT | Performed by: FAMILY MEDICINE

## 2018-03-14 PROCEDURE — 59025 FETAL NON-STRESS TEST: CPT | Mod: 26 | Performed by: FAMILY MEDICINE

## 2018-03-14 PROCEDURE — G0463 HOSPITAL OUTPT CLINIC VISIT: HCPCS | Mod: 25

## 2018-03-14 PROCEDURE — G0463 HOSPITAL OUTPT CLINIC VISIT: HCPCS

## 2018-03-14 PROCEDURE — 12000027 ZZH R&B OB

## 2018-03-14 PROCEDURE — 36415 COLL VENOUS BLD VENIPUNCTURE: CPT | Performed by: FAMILY MEDICINE

## 2018-03-14 PROCEDURE — 59025 FETAL NON-STRESS TEST: CPT

## 2018-03-14 PROCEDURE — 86901 BLOOD TYPING SEROLOGIC RH(D): CPT | Performed by: FAMILY MEDICINE

## 2018-03-14 PROCEDURE — 85027 COMPLETE CBC AUTOMATED: CPT | Performed by: FAMILY MEDICINE

## 2018-03-14 PROCEDURE — 86780 TREPONEMA PALLIDUM: CPT | Performed by: FAMILY MEDICINE

## 2018-03-14 PROCEDURE — 99207 ZZC PRENATAL VISIT: CPT | Performed by: FAMILY MEDICINE

## 2018-03-14 RX ORDER — NALOXONE HYDROCHLORIDE 0.4 MG/ML
.1-.4 INJECTION, SOLUTION INTRAMUSCULAR; INTRAVENOUS; SUBCUTANEOUS
Status: DISCONTINUED | OUTPATIENT
Start: 2018-03-14 | End: 2018-03-15

## 2018-03-14 RX ORDER — ONDANSETRON 2 MG/ML
4 INJECTION INTRAMUSCULAR; INTRAVENOUS EVERY 6 HOURS PRN
Status: DISCONTINUED | OUTPATIENT
Start: 2018-03-14 | End: 2018-03-15

## 2018-03-14 RX ORDER — METHYLERGONOVINE MALEATE 0.2 MG/ML
200 INJECTION INTRAVENOUS
Status: DISCONTINUED | OUTPATIENT
Start: 2018-03-14 | End: 2018-03-15

## 2018-03-14 RX ORDER — SODIUM CHLORIDE, SODIUM LACTATE, POTASSIUM CHLORIDE, CALCIUM CHLORIDE 600; 310; 30; 20 MG/100ML; MG/100ML; MG/100ML; MG/100ML
INJECTION, SOLUTION INTRAVENOUS CONTINUOUS
Status: DISCONTINUED | OUTPATIENT
Start: 2018-03-14 | End: 2018-03-15

## 2018-03-14 RX ORDER — IBUPROFEN 800 MG/1
800 TABLET, FILM COATED ORAL
Status: DISCONTINUED | OUTPATIENT
Start: 2018-03-14 | End: 2018-03-15

## 2018-03-14 RX ORDER — ACETAMINOPHEN 325 MG/1
650 TABLET ORAL EVERY 4 HOURS PRN
Status: DISCONTINUED | OUTPATIENT
Start: 2018-03-14 | End: 2018-03-15

## 2018-03-14 RX ORDER — OXYTOCIN 10 [USP'U]/ML
10 INJECTION, SOLUTION INTRAMUSCULAR; INTRAVENOUS
Status: DISCONTINUED | OUTPATIENT
Start: 2018-03-14 | End: 2018-03-15

## 2018-03-14 RX ORDER — OXYCODONE AND ACETAMINOPHEN 5; 325 MG/1; MG/1
1 TABLET ORAL
Status: DISCONTINUED | OUTPATIENT
Start: 2018-03-14 | End: 2018-03-15

## 2018-03-14 RX ORDER — OXYTOCIN/0.9 % SODIUM CHLORIDE 30/500 ML
100-340 PLASTIC BAG, INJECTION (ML) INTRAVENOUS CONTINUOUS PRN
Status: COMPLETED | OUTPATIENT
Start: 2018-03-14 | End: 2018-03-15

## 2018-03-14 RX ORDER — CARBOPROST TROMETHAMINE 250 UG/ML
250 INJECTION, SOLUTION INTRAMUSCULAR
Status: DISCONTINUED | OUTPATIENT
Start: 2018-03-14 | End: 2018-03-15

## 2018-03-14 ASSESSMENT — PAIN SCALES - GENERAL: PAINLEVEL: NO PAIN (0)

## 2018-03-14 NOTE — MR AVS SNAPSHOT
"              After Visit Summary   3/14/2018    Viki Busch    MRN: [id number]           Patient Information     Date Of Birth          1985        Visit Information        Provider Department      3/14/2018 8:45 AM Ludy Reza MD [facility]        Today's Diagnoses     Supervision of other normal pregnancy, antepartum    -  1       Follow-ups after your visit        Who to contact     If you have questions or need follow up information about today's clinic visit or your schedule please contact [facility] directly at [phone].  Normal or non-critical lab and imaging results will be communicated to you by [employer], letter or phone within 4 business days after the clinic has received the results. If you do not hear from us within 7 days, please contact the clinic through [employer] or phone. If you have a critical or abnormal lab result, we will notify you by phone as soon as possible.  Submit refill requests through [employer] or call your pharmacy and they will forward the refill request to us. Please allow 3 business days for your refill to be completed.          Additional Information About Your Visit        MyChart Information     [employer] gives you secure access to your electronic health record. If you see a primary care provider, you can also send messages to your care team and make appointments. If you have questions, please call your primary care clinic.  If you do not have a primary care provider, please call [phone] and they will assist you.        Care EveryWhere ID     This is your Care EveryWhere ID. This could be used by other organizations to access your [place] medical records  [id number]        Your Vitals Were     Pulse Temperature Respirations Height Last Period Pulse Oximetry    73 97.8  F (36.6  C) (Tympanic) 20 5' 8\" (1.727 m) 06/06/2017 100%    BMI (Body Mass Index)                   29.98 kg/m2            Blood Pressure from Last 3 Encounters: "   03/14/18 124/74   03/07/18 110/72   02/28/18 110/68    Weight from Last 3 Encounters:   03/14/18 197 lb 3.2 oz (89.4 kg)   03/07/18 195 lb 4 oz (88.6 kg)   02/28/18 196 lb 9.6 oz (89.2 kg)              Today, you had the following     No orders found for display       Primary Care Provider Office Phone # Fax #    Ludy Reza [phone]       [facility] [place] [address]  [place] [address]        Equal Access to Services     [facility] : Hadii aad ku hadasho Soomaali, waaxda luqadaha, qaybta kaalmada adeegyada, sergo grossman . So [facility] [phone].    ATENCIÓN: Si habla español, tiene a guerrero disposición servicios gratuitos de asistencia lingüística. Llame al [phone].    We comply with applicable federal civil rights laws and [place] laws. We do not discriminate on the basis of race, color, national origin, age, disability, sex, sexual orientation, or gender identity.            Thank you!     Thank you for choosing [facility]  for your care. Our goal is always to provide you with excellent care. Hearing back from our patients is one way we can continue to improve our services. Please take a few minutes to complete the written survey that you may receive in the mail after your visit with us. Thank you!             Your Updated Medication List - Protect others around you: Learn how to safely use, store and throw away your medicines at www.disposemymeds.org.          This list is accurate as of 3/14/18  8:54 AM.  Always use your most recent med list.                   Brand Name Dispense Instructions for use Diagnosis    acetylcysteine 600 MG Caps capsule    N-ACETYL CYSTEINE    60 capsule    Take 1 capsule (600 mg) by mouth 2 times daily    PCOS (polycystic ovarian syndrome)       fish oil-omega-3 fatty acids 1000 MG capsule      Take 2 g by mouth 2 times daily        PRENATAL FORMULA 27-1 MG Tabs     90 tablet    Take 1 tablet daily     Supervision of other normal pregnancy, antepartum       progesterone 100 MG capsule    PROMETRIUM    60 capsule    TAKE 1 CAPSULE BY MOUTH EVERY NIGHT AT BEDTIME AND INSERT 1 CAPSULE VAGINALLY EVERY NIGHT AT BEDTIME    Supervision of other normal pregnancy, antepartum, Low serum progesterone       vitamin D 41285 UNIT capsule    ERGOCALCIFEROL    4 capsule    Take 1 capsule (50,000 Units) by mouth every 7 days for 8 doses    Supervision of other normal pregnancy, antepartum

## 2018-03-14 NOTE — NURSING NOTE
"Chief Complaint   Patient presents with     Prenatal Care       Initial /74 (BP Location: Right arm, Patient Position: Sitting, Cuff Size: Adult Regular)  Pulse 73  Temp 97.8  F (36.6  C) (Tympanic)  Resp 20  Ht 5' 8\" (1.727 m)  Wt 197 lb 3.2 oz (89.4 kg)  LMP 06/06/2017  SpO2 100%  BMI 29.98 kg/m2 Estimated body mass index is 29.98 kg/(m^2) as calculated from the following:    Height as of this encounter: 5' 8\" (1.727 m).    Weight as of this encounter: 197 lb 3.2 oz (89.4 kg).  Medication Reconciliation: complete   Starla Sow MA  "

## 2018-03-14 NOTE — DISCHARGE INSTRUCTIONS
Discharge Instructions for Undelivered Patients    Diet:  * Drink 8 to 12 glasses of liquids (milk, juice, water) every day  * You may eat meals and snacks.    Activity:  * Count fetal kicks every day.  * Call your doctor if your baby is moving less than usual.    Call your provider if you notice:  * Swelling in your face or increased swelling in your hands or legs.  * Headaches that are not relieved by Tylenol (acetaminophen).  * Changes in your vision (blurring; seeing spots or stars).  * Nausea (sick to your stomach) and vomiting (throwing up).  * Weight gain of 5 pounds per week.  * Heartburn that doesn't go away.  * Signs of bladder infection: Pain when you urinate (use the toilet), needing to go more often or more urgently.  * The bag of peters (membrane) breaks, or you notice leaking in your underwear.  * Bright red blood in your underwear.  * Abdominal (lower belly) or stomach pain.  * Second (plus) baby: Contractions (tightenings) less than 10 minutes apart and getting stronger.  * Increase or change in vaginal discharge (note the color and amount).        [facility]: [phone]

## 2018-03-14 NOTE — PLAN OF CARE
Viki Busch        NST:  reactive  Start:0906  Stop:0940    Physician: Dr Reza  Reason For Test: r/o labor  EDC:3/13/18  Gestational Age: 40 1/7    Comments:  Pt here from the clinic for a NST for r/o labor.   NST reactive.  Monitors removed for pt to ambulate in the halls until she is due for the SVE, which Dr Reza wants 1 hour after pt arrived on the unit.         Briana Mccormack

## 2018-03-14 NOTE — PLAN OF CARE
Viki Busch is a 32 year old  and 40w1d patient came in complaining of No chief complaint on file.    Patient Active Problem List   Diagnosis     PCOS (polycystic ovarian syndrome)     Premenstrual syndrome     Supervision of other normal pregnancy, antepartum       Pt discharged to home at 10:35 AM and encouraged to rest and drink plenty of fluids.  Pt told to call/return if bleeding more than spotting, water breaks, contractions 3-5 minutes apart that she has to breath through.     Nursing education on signs and symptoms to monitor and reasons to call provided. Self-management instructions reviewed. AVS given and signed. All questions answered and patient verbalizes understanding.    /77 (Cuff Size: Adult Regular)  Pulse 72  Temp 97.3  F (36.3  C) (Oral)  Resp 19  LMP 2017    Cervical status: 3/60/-2/posterior  Fetal Assessment: Reactive    Discharge support:  Family/Friend Support    Obstetric History       T0      L2     SAB0   TAB0   Ectopic0   Multiple0   Live Births0       # Outcome Date GA Lbr Chi/2nd Weight Sex Delivery Anes PTL Lv   3 Current            2 Para            1 Para               Obstetric Comments   Breast and bottle fed       Briana Mccormack

## 2018-03-14 NOTE — IP AVS SNAPSHOT
MRN:[id number]                      After Visit Summary   3/14/2018    Viki Busch    MRN: [id number]           Thank you!     Thank you for choosing [facility] for your care. Our goal is always to provide you with excellent care. Hearing back from our patients is one way we can continue to improve our services. Please take a few minutes to complete the written survey that you may receive in the mail after you visit with us. Thank you!        Patient Information     Date Of Birth          1985        About your hospital stay     You were admitted on:  March 14, 2018 You last received care in the:  [facility]    You were discharged on:  March 14, 2018       Who to Call     For medical emergencies, please call 911.  For non-urgent questions about your medical care, please call your primary care provider or clinic, [phone]          Attending Provider     Provider Specialty    Ludy Reza MD [facility]       Primary Care Provider Office Phone # Fax #    Ludy Reza [phone]      Further instructions from your care team       Discharge Instructions for Undelivered Patients    Diet:  * Drink 8 to 12 glasses of liquids (milk, juice, water) every day  * You may eat meals and snacks.    Activity:  * Count fetal kicks every day.  * Call your doctor if your baby is moving less than usual.    Call your provider if you notice:  * Swelling in your face or increased swelling in your hands or legs.  * Headaches that are not relieved by Tylenol (acetaminophen).  * Changes in your vision (blurring; seeing spots or stars).  * Nausea (sick to your stomach) and vomiting (throwing up).  * Weight gain of 5 pounds per week.  * Heartburn that doesn't go away.  * Signs of bladder infection: Pain when you urinate (use the toilet), needing to go more often or more urgently.  * The bag of peters (membrane) breaks, or you notice leaking in your underwear.  *  Bright red blood in your underwear.  * Abdominal (lower belly) or stomach pain.  * Second (plus) baby: Contractions (tightenings) less than 10 minutes apart and getting stronger.  * Increase or change in vaginal discharge (note the color and amount).        [facility]: [phone]        Pending Results     No orders found from 3/12/2018 to 3/15/2018.            Admission Information     Date & Time Provider Department Dept. Phone    3/14/2018 Ludy Reza MD [facility] [phone]      Your Vitals Were     Blood Pressure Pulse Temperature Respirations Last Period       122/77 (Cuff Size: Adult Regular) 72 97.3  F (36.3  C) (Oral) 19 06/06/2017       [employer] Information     [employer] gives you secure access to your electronic health record. If you see a primary care provider, you can also send messages to your care team and make appointments. If you have questions, please call your primary care clinic.  If you do not have a primary care provider, please call [phone] and they will assist you.        Care EveryWhere ID     This is your Care EveryWhere ID. This could be used by other organizations to access your [facility] [place] medical records  [id number]        Equal Access to Services     LORRAINE SELF : Liliya Mc, waedita patel, qagelyta kaalyadira manzano, sergo bojorquez. So [facility] [phone].    ATENCIÓN: Si habla español, tiene a guerrero disposición servicios gratuitos de asistencia lingüística. Llame al [phone].    We comply with applicable federal civil rights laws and [place] laws. We do not discriminate on the basis of race, color, national origin, age, disability, sex, sexual orientation, or gender identity.               Review of your medicines      UNREVIEWED medicines. Ask your doctor about these medicines        Dose / Directions    acetylcysteine 600 MG Caps capsule   Commonly known as:  N-ACETYL CYSTEINE   Used  for:  PCOS (polycystic ovarian syndrome)        Dose:  600 mg   Take 1 capsule (600 mg) by mouth 2 times daily   Quantity:  60 capsule   Refills:  1       fish oil-omega-3 fatty acids 1000 MG capsule        Dose:  2 g   Take 2 g by mouth 2 times daily   Refills:  0       PRENATAL FORMULA 27-1 MG Tabs   Used for:  Supervision of other normal pregnancy, antepartum        Take 1 tablet daily   Quantity:  90 tablet   Refills:  3       progesterone 100 MG capsule   Commonly known as:  PROMETRIUM   Used for:  Supervision of other normal pregnancy, antepartum, Low serum progesterone        TAKE 1 CAPSULE BY MOUTH EVERY NIGHT AT BEDTIME AND INSERT 1 CAPSULE VAGINALLY EVERY NIGHT AT BEDTIME   Quantity:  60 capsule   Refills:  5       vitamin D 30283 UNIT capsule   Commonly known as:  ERGOCALCIFEROL   Used for:  Supervision of other normal pregnancy, antepartum        Dose:  77835 Units   Take 1 capsule (50,000 Units) by mouth every 7 days for 8 doses   Quantity:  4 capsule   Refills:  1                Protect others around you: Learn how to safely use, store and throw away your medicines at www.disposemymeds.org.             Medication List: This is a list of all your medications and when to take them. Check marks below indicate your daily home schedule. Keep this list as a reference.      Medications           Morning Afternoon Evening Bedtime As Needed    acetylcysteine 600 MG Caps capsule   Commonly known as:  N-ACETYL CYSTEINE   Take 1 capsule (600 mg) by mouth 2 times daily                                fish oil-omega-3 fatty acids 1000 MG capsule   Take 2 g by mouth 2 times daily                                PRENATAL FORMULA 27-1 MG Tabs   Take 1 tablet daily                                progesterone 100 MG capsule   Commonly known as:  PROMETRIUM   TAKE 1 CAPSULE BY MOUTH EVERY NIGHT AT BEDTIME AND INSERT 1 CAPSULE VAGINALLY EVERY NIGHT AT BEDTIME                                vitamin D 53094 UNIT capsule    Commonly known as:  ERGOCALCIFEROL   Take 1 capsule (50,000 Units) by mouth every 7 days for 8 doses

## 2018-03-14 NOTE — IP AVS SNAPSHOT
[facility]    [address] [address]    Phone:  [phone]    Fax:  [phone]                                       After Visit Summary   3/14/2018    Viki Busch    MRN: [id number]           After Visit Summary Signature Page     I have received my discharge instructions, and my questions have been answered. I have discussed any challenges I see with this plan with the nurse or doctor.    ..........................................................................................................................................  Patient/Patient Representative Signature      ..........................................................................................................................................  Patient Representative Print Name and Relationship to Patient    ..................................................               ................................................  Date                                            Time    ..........................................................................................................................................  Reviewed by Signature/Title    ...................................................              ..............................................  Date                                                            Time

## 2018-03-14 NOTE — PROGRESS NOTES
Frequent contractions yesterday, q 10-20 min consistantly, woke up overnight btwn 2-4 and couldn't sleep  Very mild per her report but she has a history of quick labors  Significant bloody show with check  Report given to OB we will send her upstairs

## 2018-03-14 NOTE — IP AVS SNAPSHOT
[facility]    [address] [address]    Phone:  [phone]    Fax:  [phone]                                       After Visit Summary   3/14/2018    Viki Busch    MRN: [id number]           After Visit Summary Signature Page     I have received my discharge instructions, and my questions have been answered. I have discussed any challenges I see with this plan with the nurse or doctor.    ..........................................................................................................................................  Patient/Patient Representative Signature      ..........................................................................................................................................  Patient Representative Print Name and Relationship to Patient    ..................................................               ................................................  Date                                            Time    ..........................................................................................................................................  Reviewed by Signature/Title    ...................................................              ..............................................  Date                                                            Time

## 2018-03-14 NOTE — IP AVS SNAPSHOT
MRN:[id number]                      After Visit Summary   3/14/2018    Viki Busch    MRN: [id number]           Thank you!     Thank you for choosing [facility] for your care. Our goal is always to provide you with excellent care. Hearing back from our patients is one way we can continue to improve our services. Please take a few minutes to complete the written survey that you may receive in the mail after you visit with us. Thank you!        Patient Information     Date Of Birth          1985        Designated Caregiver       Most Recent Value    Caregiver    Will someone help with your care after discharge? no      About your hospital stay     You were admitted on:  March 14, 2018 You last received care in the:  [facility]    You were discharged on:  March 16, 2018       Who to Call     For medical emergencies, please call 911.  For non-urgent questions about your medical care, please call your primary care provider or clinic, [phone]          Attending Provider     Provider Specialty    Ludy Reza MD Family Practice       Primary Care Provider Office Phone # Fax #    Ludy Reza [phone]      Your next 10 appointments already scheduled     Apr 09, 2018  3:45 PM CDT   (Arrive by 3:30 PM)   SHORT with Ludy Reza MD   [facility] ([facility] )    [address] [place] [address]  [place] [address]   [phone]              Further instructions from your care team       Postpartum Vaginal Delivery Instructions    Activity    Ask family and friends for help when you need it.  Do not place anything in your vagina until approved by your Physician .  You are not restricted on other activities, but take it easy for a few weeks to allow your body to recover from delivery.  You are able to do any activities you feel up to that point.  No driving until you have stopped taking narcotic pain medications.    Call your  health care provider if you have any of these symptoms:    Increased pain, swelling, redness, or fluid around your stiches from an episiotomy or perineal tear.  A fever above 100.4 F (38 C) with or without chills when placing a thermometer under your tongue.  You soak a sanitary pad with blood within 1 hour, or you see blood clots larger than a golf ball.  Bleeding that lasts more than 6 weeks.  Vaginal discharge that smells bad.  Severe pain, cramping or tenderness in your lower belly area.  A need to urinate more frequently (use the toilet more often), more urgently (use the toilet very quickly), or it burns when you urinate.  Nausea and vomiting.  Redness, swelling or pain around a vein in your leg.  Problems breastfeeding or a red or painful area on your breast.  Chest pain and cough or are gasping for air.  Problems coping with sadness, anxiety, or depression.  If you have any concerns about hurting yourself or the baby, call your provider immediately. The Safe Place for Newborns law allows you to bring your baby to the hospital up to 7 days, no questions asked.  You have questions or concerns after you return home.    Keep your hands clean:  Always wash your hands before touching your perineal area and stitches.  This helps reduce your risk of infection.  If your hands aren't dirty, you may use an alcohol hand-rub to clean your hands. Keep your nails clean and short.      Pending Results     No orders found from 3/12/2018 to 3/15/2018.            Statement of Approval     Ordered          03/16/18 1310  I have reviewed and agree with all the recommendations and orders detailed in this document.  EFFECTIVE NOW     Approved and electronically signed by:  Ludy Reza MD             Admission Information     Date & Time Provider Department Dept. Phone    3/14/2018 Ludy Reza MD [facility] [phone]      Your Vitals Were     Blood Pressure Pulse Temperature Respirations Last Period  Pulse Oximetry    115/84 63 97.7  F (36.5  C) (Oral) 18 06/06/2017 99%      [employer] Information     [employer] gives you secure access to your electronic health record. If you see a primary care provider, you can also send messages to your care team and make appointments. If you have questions, please call your primary care clinic.  If you do not have a primary care provider, please call [phone] and they will assist you.        Care EveryWhere ID     This is your Care EveryWhere ID. This could be used by other organizations to access your [facility] medical records  [id number]        Equal Access to Services     VIJI SELF : Hadjairo Mc, king patel, trudy manzano, sergo grossman . [facility] [phone].    ATENCIÓN: Si habla español, tiene a guerrero disposición servicios gratuitos de asistencia lingüística. Llame al [phone].    We comply with applicable federal civil rights laws and [place] laws. We do not discriminate on the basis of race, color, national origin, age, disability, sex, sexual orientation, or gender identity.               Review of your medicines      UNREVIEWED medicines. Ask your doctor about these medicines        Dose / Directions    acetylcysteine 600 MG Caps capsule   Commonly known as:  N-ACETYL CYSTEINE   Used for:  PCOS (polycystic ovarian syndrome)        Dose:  600 mg   Take 1 capsule (600 mg) by mouth 2 times daily   Quantity:  60 capsule   Refills:  1       fish oil-omega-3 fatty acids 1000 MG capsule        Dose:  2 g   Take 2 g by mouth 2 times daily   Refills:  0       PRENATAL FORMULA 27-1 MG Tabs   Used for:  Supervision of other normal pregnancy, antepartum        Take 1 tablet daily   Quantity:  90 tablet   Refills:  3       progesterone 100 MG capsule   Commonly known as:  PROMETRIUM   Used for:  Supervision of other normal pregnancy, antepartum, Low serum progesterone        TAKE 1 CAPSULE BY MOUTH EVERY NIGHT AT  BEDTIME AND INSERT 1 CAPSULE VAGINALLY EVERY NIGHT AT BEDTIME   Quantity:  60 capsule   Refills:  5       vitamin D 51801 UNIT capsule   Commonly known as:  ERGOCALCIFEROL   Used for:  Supervision of other normal pregnancy, antepartum        Dose:  07834 Units   Take 1 capsule (50,000 Units) by mouth every 7 days for 8 doses   Quantity:  4 capsule   Refills:  1                Protect others around you: Learn how to safely use, store and throw away your medicines at www.disposemymeds.org.             Medication List: This is a list of all your medications and when to take them. Check marks below indicate your daily home schedule. Keep this list as a reference.      Medications           Morning Afternoon Evening Bedtime As Needed    acetylcysteine 600 MG Caps capsule   Commonly known as:  N-ACETYL CYSTEINE   Take 1 capsule (600 mg) by mouth 2 times daily                                fish oil-omega-3 fatty acids 1000 MG capsule   Take 2 g by mouth 2 times daily                                PRENATAL FORMULA 27-1 MG Tabs   Take 1 tablet daily                                progesterone 100 MG capsule   Commonly known as:  PROMETRIUM   TAKE 1 CAPSULE BY MOUTH EVERY NIGHT AT BEDTIME AND INSERT 1 CAPSULE VAGINALLY EVERY NIGHT AT BEDTIME                                vitamin D 75057 UNIT capsule   Commonly known as:  ERGOCALCIFEROL   Take 1 capsule (50,000 Units) by mouth every 7 days for 8 doses                                          More Information        Understanding Postpartum Depression  You ve just had a baby. You expected to be excited and happy. But instead you find yourself crying for no reason. You may have trouble coping with your daily tasks. You feel sad, tired, and hopeless most of the time. You may even feel ashamed or guilty. But what you re going through is not your fault and you can feel better. Talk to your healthcare provider. He or she can help.    What is depression?  Depression is a mood  "disorder that affects the way you think and feel. The most common symptom is a feeling of deep sadness. You may also feel as if you just can t cope with life. Other symptoms include:    Gaining or losing a lot of weight    Sleeping too much or too little    Feeling tired all the time    Feeling restless    Crying a lot    Having too little or too much appetite.    Withdrawing from friends and family    Having headaches, aches and pains, or stomach problems that won't go away.    Fears of harming your baby    Lack of interest in your baby    Feeling worthless or guilty    No longer finding pleasure in things you used to    Having trouble thinking clearly or making decisions    Thinking about death or suicide   Depression after childbirth  You may be weepy and tired right after giving birth. These feelings are normal. They re sometimes called the  baby blues.  These blues go away after 1 to 2 weeks. However, postpartum (meaning  after birth ) depression lasts much longer and is more severe than the \"baby blues.\" It can make you feel sad and hopeless. You may also fear that your baby will be harmed and worry about being a bad mother.  What causes postpartum depression?  The exact cause of postpartum depression is unknown. Changes in brain chemistry or structure are believed to play a big role in depression. It may be due to changes in your hormones during and after childbirth. You may also be tired from caring for your baby and adjusting to being a mother. All these factors may make you feel depressed. In some cases, your genes may also play a role.  Depression can be treated  There are many ways to treat postpartum depression. Talking to your healthcare provider is the first step toward feeling better.  When to call your healthcare provider  Call your healthcare provider if you:     Cry for no clear reason    Have trouble sleeping, eating, and making choices    Questions whether you can handle caring for a " "baby    Have intense feelings of sadness, anxiety, or despair that prevent you from being able to do your daily tasks  Resources    National [place] of Mental Sccbtl908-211-7896bwm.[facility].nih.gov    National [place] on Mental Rajhhqz164-412-6699sih.omid.org    Mental Health Qcaulpi911-857-6825nhp.[facility].org    National Suicide Trwfckd441-315-2171 (800-SUICIDE)   Date Last Reviewed: 8/16/2015 2000-2017 [employer]. [address] [place] [address]. All rights reserved. This information is not intended as a substitute for professional medical care. Always follow your healthcare professional's instructions.                Holding Your Baby While Breastfeeding      \"Laid-back\" position     Comfort and position are the keys to successful breastfeeding. Learn how to position your baby correctly at the breast. Choose the hold that works best for both of you. You may need to change holds as your baby grows.     Always make sure your baby is tummy-to-tummy with you.    Laid-back  baby-led natural position  Lie back on a sofa, bed, or reclining chair so that your body is at a comfortable 45-degree angle, but not flat. This may be more comfortable than sitting up and leaning over a breastfeeding pillow.  Here are some tips:    Place your baby on his or her tummy on your chest. When your baby feels your body with the whole front of his or her body, it triggers his or her senses to find your nipple. Let your baby move over to the breast and latch on without your help. Your arms will make a \"nest\" around your baby.    When your baby attaches, make sure you see more areola above the upper lip than below the lower lip. This should help protect your nipples from soreness.  Other positions you can try       Cradle hold \"Football\" hold Side-lying hold   Cradle hold or  cross-cradle  hold  Here are some tips:    Sit upright. Make sure you have back support and that you are comfortable and relaxed. Raise your " baby to breast height. Use a pillow under your baby s bottom. Put your baby on his or her side on the pillow so his or her tummy is touching your tummy. Use a chair with armrests for your arms.    Keep your knees level with your hips. Put a stool or pillow under your feet if needed.    Cradle your baby. Make sure your baby s body is well supported by your arm (cradle hold). Or use your hand to support the base of your baby's head and neck (cross-cradle hold).     Make sure your baby s body is facing and touching your body with your baby's head higher than his or her bottom. It is easier for your baby to swallow that way.   Football  hold  You can use the football hold to breastfeed two babies at once.  Here are some tips:    Place a pillow at your side. Lay the baby s bottom on the pillow so that your baby's bottom is lower than his or her head. Hold your baby's neck so that your fingers are below his or her ears.     Make sure your baby's body is on his or her side so the whole front of your baby's body is touching yours.    Tuck your baby s legs between your arm and body, as if you were clutching a football or purse at your side.  Side-lying hold  Here are some tips:    Stretch out on your side. Using pillows to support your head, neck, and back. Place your baby on his or her side facing you so that the front part of your baby's body is against your body.    Support your baby s head, neck, and back with your arm.    Let your baby find the nipple and attach without help.    Switch breasts. Gather your baby close to your chest. Then roll onto your other side to feed the same way from the other breast.    It is always possible to fall asleep while nursing, so make sure you are in a safe place when you use this hold. Do not use a couch. Follow your healthcare provider's advice about a safe sleep environment for your baby.  Date Last Reviewed: 3/1/2017    4918-5486 [employer]. [address] [facility],  [place] [address]. All rights reserved. This information is not intended as a substitute for professional medical care. Always follow your healthcare professional's instructions.                Breast Care After Birth  A few days after your baby s birth, your breasts will swell with milk. They are likely to feel tender and heavy. This is normal. To help prevent breast soreness and control irritation, follow these tips:     Moist heat, like a shower, helps to promote the release of breastmilk.   Coping with swelling  Here are tips to cope with swelling:    Use cold compresses or an ice pack to help reduce the ache or pain.    Breastfeed often to keep milk from clogging your breast ducts.    If your nipples are flat from breast swelling, hand express some milk. Squeeze out a few drops of milk by massaging and compressing your breasts.    If you have swelling with pain or fever, call your healthcare provider.  Preventing sore nipples  Here are tips to prevent sore nipples:    Make sure baby latches on to your breast correctly. The baby s mouth should be opened very wide and your entire areola should be in the baby's mouth.    You can let milk dry on your nipples. This dried milk can protect the skin on your nipple.    Do not use alcohol, soap, or scented cleansers on your breasts. These can cause the nipples to dry and crack.    Do not wear nursing pads that are lined with plastic. They hold in moisture and can cause chapping.    If you have cracked or bleeding nipples, consult your healthcare provider or a lactation consultant. He or she will make sure that your baby's latch is correct and may suggest topical treatment, like pure lanolin.  Choosing a good bra  Wearing the right-sized bra is especially important now. If a bra is too tight, it may cause a duct in your breast to clog and become irritated. If possible, have a salesperson help fit you for a new bra. Look for one that s 100% cotton and comfortable. Also,  choose a bra with wide straps that won t dig into your back and shoulders. If you re breastfeeding, find a nursing bra that allows you to uncover one breast at a time.  If you are not breastfeeding  Here are tips to avoid discomfort:    Avoid stimulation of nipples    Wear a tight-fitting bra    Apply cold compresses or ice packs for discomfort     Call your healthcare provider   Call your healthcare provider right away if you have any of the following:    A fever or chills    Extreme tiredness and body aches, as if you have the flu    Burning or pain in one or both breasts    Red streaks on a breast    Hard or lumpy spots in one or both breasts    A feeling of warmth or heat in one or both breasts    Breasts so swollen your baby cannot latch on to the nipples    Nipples that are cracked or bleeding    Low milk supply or your milk does not flow freely   Date Last Reviewed: 9/7/2015 2000-2017 [employer]. [address] [place] [address]. All rights reserved. This information is not intended as a substitute for professional medical care. Always follow your healthcare professional's instructions.                Expressing Your Milk  Work, school, or even a late-night movie can require you to be away from your baby. This doesn't mean you have to give up breastfeeding. You can transfer milk from your breast to a bottle (expression) and feed your baby breastmilk in a bottle. But remember, don t give your baby bottles or pacifiers until he or she is at least 4 to 6 weeks old. This helps you both get a good start on breastfeeding. Your baby can get used to your natural nipple first.      Expressing by hand Expressing with double pump      Always wash your hands before expressing milk from your breast to a bottle.   Stimulating letdown    Hold a washcloth under very warm water and wring it out.    Place one warm washcloth over each breast to warm them.     Gently massage your breasts to stimulate  "the milk flow.    Start under the arm and move around the entire breast.     Use the backs of your fingernails to gently scratch the skin of your breasts downward from the outside toward your nipples.     If you re away from your baby, looking at your baby s picture can help your milk let down.  Expressing by hand or pump  Your lactation consultant can help you choose the best method for your needs. Here are some tips:    Expressing by hand reduces pressure in swollen or leaky breasts. It may be a good way to start a pumping session. If you need to give expressed milk to your baby in the first few days after delivery, hand expression can often help get more colostrum than using a pump. Ask your nurse or midwife to teach you how to hand express.    Start expressing within 6 hours of separation from your baby in the hospital.    When  from your baby, it is best to express as often and as long as a baby would breastfeed. Newborns feed 8 to 12 times each 24 hours.    A pump gently pulls your nipple into the cup like a baby s suck and can be the fastest way to express after your milk comes in. Pumps come in manual, battery-operated, and electric styles. To protect your breasts and the milk you pump, follow the instructions that come with your pump.    For sick or premature babies who aren't feeding at the breast, \"hands-on pumping\" is a special way to help be sure you make enough milk. Hands-on pumping involves a combination of both hand expression and an electrical pump.     Hand expressing while using a pump can increase the amount of milk you can pump. It can also increase the fat content of the pumped milk.    You can usually buy or rent a pump from a drugstore or medical equipment store. Check with your hospital to find out where you can buy or rent a pump.  Working and breastfeeding    Breastfeed your baby all of the time during your maternity leave. This helps set up your milk supply for the whole " year.    When your baby is about 2 weeks old, start pumping after you feed the baby. You can freeze this expressed milk. It will help you build a supply for going back to work. Nursing plus pumping will help your breasts make more milk. Talk with your family or childcare provider about timing bottle feedings while you are at work. It s best if your baby is ready to breastfeed when you return from work.    Express milk during work breaks. This helps protect your milk supply. It also helps prevent engorged or leaking breasts.    Arrange to breastfeed at lunch if your childcare is nearby. If not, be sure to pump during your lunch break.    Breastfeed before you leave for work and soon after you return home. Your partner may be able to make dinner while you feed the baby.    Breastfeed at night and on weekends. This will keep up your milk supply. Your baby can have bottled breastmilk during the day while you are at work.  Date Last Reviewed: 3/1/2017    3966-4742 [employer]. [address] [place] [address], [place] [address]. All rights reserved. This information is not intended as a substitute for professional medical care. Always follow your healthcare professional's instructions.

## 2018-03-14 NOTE — PLAN OF CARE
OB Triage Note  Viki Busch  MRN: [id number]  Gestational Age: 40w1d      Viki Busch presents for rule out labor (sign/symptom/concern), pt was seen in the clinic and checked per Dr Reza, bloody show noted per Dr Reza and pt's cervix was 2-3cm.      States sirisha since yesterday, which slowed down and she was able to sleep.  Pt stated she is having occasional mild contractions now.  Rates pain at 4/10.  Bleeding: bloody show per MD report, with SVE at the clinic.   Denies LOF.      Oriented patient to surroundings. Call light within reach.     FHT: reassuring  Uterine Assessment:Contractions: frequency q 6-8 minutes, Strength mild and Duration 60-80 seconds.       Plan:  -Initial NST, then fetal/uterine monitoring per MD/patient plan.  -Sterile vaginal exam  -Nursing education on the plan of care provided.

## 2018-03-15 ENCOUNTER — ANESTHESIA (OUTPATIENT)
Dept: OBGYN | Facility: HOSPITAL | Age: 33
End: 2018-03-15
Payer: COMMERCIAL

## 2018-03-15 ENCOUNTER — ANESTHESIA EVENT (OUTPATIENT)
Dept: OBGYN | Facility: HOSPITAL | Age: 33
End: 2018-03-15
Payer: COMMERCIAL

## 2018-03-15 LAB
ERYTHROCYTE [DISTWIDTH] IN BLOOD BY AUTOMATED COUNT: 13.3 % (ref 10–15)
HCT VFR BLD AUTO: 36 % (ref 35–47)
HGB BLD-MCNC: 12.7 G/DL (ref 11.7–15.7)
MCH RBC QN AUTO: 31.8 PG (ref 26.5–33)
MCHC RBC AUTO-ENTMCNC: 35.3 G/DL (ref 31.5–36.5)
MCV RBC AUTO: 90 FL (ref 78–100)
PLATELET # BLD AUTO: 205 10E9/L (ref 150–450)
RBC # BLD AUTO: 3.99 10E12/L (ref 3.8–5.2)
WBC # BLD AUTO: 8.6 10E9/L (ref 4–11)

## 2018-03-15 PROCEDURE — 25000128 H RX IP 250 OP 636: Performed by: NURSE ANESTHETIST, CERTIFIED REGISTERED

## 2018-03-15 PROCEDURE — 72200001 ZZH LABOR CARE VAGINAL DELIVERY SINGLE

## 2018-03-15 PROCEDURE — 12000027 ZZH R&B OB

## 2018-03-15 PROCEDURE — 37000011 ZZH ANESTHESIA WARD SERVICE: Performed by: NURSE ANESTHETIST, CERTIFIED REGISTERED

## 2018-03-15 PROCEDURE — 25000132 ZZH RX MED GY IP 250 OP 250 PS 637: Performed by: FAMILY MEDICINE

## 2018-03-15 PROCEDURE — 40000275 ZZH STATISTIC RCP TIME EA 10 MIN

## 2018-03-15 PROCEDURE — 25000125 ZZHC RX 250: Performed by: NURSE ANESTHETIST, CERTIFIED REGISTERED

## 2018-03-15 PROCEDURE — 3E0R3BZ INTRODUCTION OF ANESTHETIC AGENT INTO SPINAL CANAL, PERCUTANEOUS APPROACH: ICD-10-PCS | Performed by: FAMILY MEDICINE

## 2018-03-15 PROCEDURE — 59400 OBSTETRICAL CARE: CPT | Performed by: FAMILY MEDICINE

## 2018-03-15 PROCEDURE — 25000128 H RX IP 250 OP 636

## 2018-03-15 PROCEDURE — 25000125 ZZHC RX 250: Performed by: FAMILY MEDICINE

## 2018-03-15 RX ORDER — OXYTOCIN/0.9 % SODIUM CHLORIDE 30/500 ML
100 PLASTIC BAG, INJECTION (ML) INTRAVENOUS CONTINUOUS
Status: DISCONTINUED | OUTPATIENT
Start: 2018-03-15 | End: 2018-03-16 | Stop reason: HOSPADM

## 2018-03-15 RX ORDER — EPHEDRINE SULFATE 50 MG/ML
5 INJECTION, SOLUTION INTRAMUSCULAR; INTRAVENOUS; SUBCUTANEOUS
Status: DISCONTINUED | OUTPATIENT
Start: 2018-03-15 | End: 2018-03-15

## 2018-03-15 RX ORDER — BUPIVACAINE HYDROCHLORIDE 2.5 MG/ML
1.2 INJECTION, SOLUTION EPIDURAL; INFILTRATION; INTRACAUDAL ONCE
Status: DISCONTINUED | OUTPATIENT
Start: 2018-03-15 | End: 2018-03-15

## 2018-03-15 RX ORDER — BUPIVACAINE HYDROCHLORIDE 2.5 MG/ML
INJECTION, SOLUTION EPIDURAL; INFILTRATION; INTRACAUDAL
Status: COMPLETED
Start: 2018-03-15 | End: 2018-03-15

## 2018-03-15 RX ORDER — FENTANYL CITRATE 50 UG/ML
50-100 INJECTION, SOLUTION INTRAMUSCULAR; INTRAVENOUS
Status: DISCONTINUED | OUTPATIENT
Start: 2018-03-15 | End: 2018-03-15

## 2018-03-15 RX ORDER — ACETAMINOPHEN 325 MG/1
650 TABLET ORAL EVERY 4 HOURS PRN
Status: DISCONTINUED | OUTPATIENT
Start: 2018-03-15 | End: 2018-03-16 | Stop reason: HOSPADM

## 2018-03-15 RX ORDER — AMOXICILLIN 250 MG
1 CAPSULE ORAL 2 TIMES DAILY PRN
Status: DISCONTINUED | OUTPATIENT
Start: 2018-03-15 | End: 2018-03-16 | Stop reason: HOSPADM

## 2018-03-15 RX ORDER — LANOLIN 100 %
OINTMENT (GRAM) TOPICAL
Status: DISCONTINUED | OUTPATIENT
Start: 2018-03-15 | End: 2018-03-16 | Stop reason: HOSPADM

## 2018-03-15 RX ORDER — DIPHENHYDRAMINE HYDROCHLORIDE 50 MG/ML
25 INJECTION INTRAMUSCULAR; INTRAVENOUS EVERY 6 HOURS PRN
Status: DISCONTINUED | OUTPATIENT
Start: 2018-03-15 | End: 2018-03-15

## 2018-03-15 RX ORDER — DIPHENHYDRAMINE HCL 25 MG
25 CAPSULE ORAL EVERY 6 HOURS PRN
Status: DISCONTINUED | OUTPATIENT
Start: 2018-03-15 | End: 2018-03-15

## 2018-03-15 RX ORDER — HYDROCORTISONE 2.5 %
CREAM (GRAM) TOPICAL 3 TIMES DAILY PRN
Status: DISCONTINUED | OUTPATIENT
Start: 2018-03-15 | End: 2018-03-16 | Stop reason: HOSPADM

## 2018-03-15 RX ORDER — NALOXONE HYDROCHLORIDE 0.4 MG/ML
.1-.4 INJECTION, SOLUTION INTRAMUSCULAR; INTRAVENOUS; SUBCUTANEOUS
Status: DISCONTINUED | OUTPATIENT
Start: 2018-03-15 | End: 2018-03-15

## 2018-03-15 RX ORDER — OXYTOCIN 10 [USP'U]/ML
10 INJECTION, SOLUTION INTRAMUSCULAR; INTRAVENOUS
Status: DISCONTINUED | OUTPATIENT
Start: 2018-03-15 | End: 2018-03-16 | Stop reason: HOSPADM

## 2018-03-15 RX ORDER — LIDOCAINE 40 MG/G
CREAM TOPICAL
Status: DISCONTINUED | OUTPATIENT
Start: 2018-03-15 | End: 2018-03-15

## 2018-03-15 RX ORDER — NALOXONE HYDROCHLORIDE 0.4 MG/ML
.1-.4 INJECTION, SOLUTION INTRAMUSCULAR; INTRAVENOUS; SUBCUTANEOUS
Status: DISCONTINUED | OUTPATIENT
Start: 2018-03-15 | End: 2018-03-16 | Stop reason: HOSPADM

## 2018-03-15 RX ORDER — BISACODYL 10 MG
10 SUPPOSITORY, RECTAL RECTAL DAILY PRN
Status: DISCONTINUED | OUTPATIENT
Start: 2018-03-17 | End: 2018-03-16 | Stop reason: HOSPADM

## 2018-03-15 RX ORDER — IBUPROFEN 800 MG/1
800 TABLET, FILM COATED ORAL EVERY 6 HOURS PRN
Status: DISCONTINUED | OUTPATIENT
Start: 2018-03-15 | End: 2018-03-16 | Stop reason: HOSPADM

## 2018-03-15 RX ORDER — FENTANYL CITRATE 50 UG/ML
INJECTION, SOLUTION INTRAMUSCULAR; INTRAVENOUS PRN
Status: DISCONTINUED | OUTPATIENT
Start: 2018-03-15 | End: 2018-03-15

## 2018-03-15 RX ORDER — MISOPROSTOL 200 UG/1
400 TABLET ORAL
Status: DISCONTINUED | OUTPATIENT
Start: 2018-03-15 | End: 2018-03-16 | Stop reason: HOSPADM

## 2018-03-15 RX ORDER — ERGOCALCIFEROL 1.25 MG/1
50000 CAPSULE, LIQUID FILLED ORAL
Status: DISCONTINUED | OUTPATIENT
Start: 2018-03-21 | End: 2018-03-16 | Stop reason: HOSPADM

## 2018-03-15 RX ORDER — OXYTOCIN/0.9 % SODIUM CHLORIDE 30/500 ML
340 PLASTIC BAG, INJECTION (ML) INTRAVENOUS CONTINUOUS PRN
Status: DISCONTINUED | OUTPATIENT
Start: 2018-03-15 | End: 2018-03-16 | Stop reason: HOSPADM

## 2018-03-15 RX ORDER — AMOXICILLIN 250 MG
2 CAPSULE ORAL 2 TIMES DAILY PRN
Status: DISCONTINUED | OUTPATIENT
Start: 2018-03-15 | End: 2018-03-16 | Stop reason: HOSPADM

## 2018-03-15 RX ORDER — BUPIVACAINE HYDROCHLORIDE 2.5 MG/ML
INJECTION, SOLUTION INFILTRATION; PERINEURAL PRN
Status: DISCONTINUED | OUTPATIENT
Start: 2018-03-15 | End: 2018-03-15

## 2018-03-15 RX ORDER — FENTANYL CITRATE 50 UG/ML
INJECTION, SOLUTION INTRAMUSCULAR; INTRAVENOUS
Status: COMPLETED
Start: 2018-03-15 | End: 2018-03-15

## 2018-03-15 RX ADMIN — FENTANYL CITRATE 50 MCG: 50 INJECTION, SOLUTION INTRAMUSCULAR; INTRAVENOUS at 03:57

## 2018-03-15 RX ADMIN — BUPIVACAINE HYDROCHLORIDE 1.2 ML: 2.5 INJECTION, SOLUTION INFILTRATION; PERINEURAL at 04:54

## 2018-03-15 RX ADMIN — IBUPROFEN 800 MG: 800 TABLET ORAL at 21:51

## 2018-03-15 RX ADMIN — IBUPROFEN 800 MG: 800 TABLET ORAL at 15:30

## 2018-03-15 RX ADMIN — OXYTOCIN-SODIUM CHLORIDE 0.9% IV SOLN 30 UNIT/500ML 340 ML/HR: 30-0.9/5 SOLUTION at 06:19

## 2018-03-15 RX ADMIN — FENTANYL CITRATE 25 MCG: 50 INJECTION, SOLUTION INTRAMUSCULAR; INTRAVENOUS at 04:54

## 2018-03-15 RX ADMIN — IBUPROFEN 800 MG: 800 TABLET ORAL at 07:00

## 2018-03-15 NOTE — ANESTHESIA PREPROCEDURE EVALUATION
Anesthesia Evaluation       history and physical reviewed .      No history of anesthetic complications          ROS/MED HX    ENT/Pulmonary:  - neg pulmonary ROS     Neurologic:  - neg neurologic ROS     Cardiovascular:  - neg cardiovascular ROS       METS/Exercise Tolerance:     Hematologic:         Musculoskeletal:         GI/Hepatic:         Renal/Genitourinary:         Endo:         Psychiatric:         Infectious Disease:         Malignancy:         Other:                     Physical Exam  Normal systems: cardiovascular, pulmonary and dental    Airway   Mallampati: II  TM distance: > 3 FB  Neck ROM: full  Mouth opening: > 3 cm    Dental     Cardiovascular       Pulmonary     Other findings: PCO      neg OB ROS                 Anesthesia Plan      History & Physical Review      ASA Status:  .  OB Epidural Asa: 2 and emergent       Plan for     Discussed risks and benefits with patient for intrathecal for labor including itching, sore back, infection, hematoma, spinal headache, CV complications, inability to place, and nerve damage. Pt wishes to proceed.       Postoperative Care      Consents  Anesthetic plan, risks, benefits and alternatives discussed with:  Patient..                          .

## 2018-03-15 NOTE — H&P
[facility] Labor and Delivery History and Physical    Viki Busch MRN# [id number]   Age: 32 year old YOB: 1985     Date of Admission:  3/14/2018    Primary care provider: Ludy Reza           Chief Complaint:   Viki Busch is a 32 year old female who is 40w2d pregnant and being admitted for active labor management.          Pregnancy history:     OBSTETRIC HISTORY:    Obstetric History       T0      L2     SAB0   TAB0   Ectopic0   Multiple0   Live Births0       # Outcome Date GA Lbr Chi/2nd Weight Sex Delivery Anes PTL Lv   3 Current            2 Para            1 Para               Obstetric Comments   Breast and bottle fed       EDC: Estimated Date of Delivery: Mar 13, 2018    Prenatal Labs:   Lab Results   Component Value Date    ABO A 2018    RH Pos 2018    AS Neg 2017    HEPBANG Nonreactive 2017    CHPCRT Negative 2017    GCPCRT Negative 2017    TREPAB Negative 2017    HGB 12.7 2018       GBS Status:   Lab Results   Component Value Date    GBS Negative 2018       Active Problem List  Patient Active Problem List   Diagnosis     PCOS (polycystic ovarian syndrome)     Premenstrual syndrome     Supervision of other normal pregnancy, antepartum     Encounter for triage in pregnant patient     Normal labor and delivery       Medication Prior to Admission  Prescriptions Prior to Admission   Medication Sig Dispense Refill Last Dose     vitamin D (ERGOCALCIFEROL) 49910 UNIT capsule Take 1 capsule (50,000 Units) by mouth every 7 days for 8 doses 4 capsule 1 Past Week at Unknown time     Prenatal Vit-Fe Fumarate-FA (PRENATAL FORMULA) 27-1 MG TABS Take 1 tablet daily 90 tablet 3 3/14/2018 at Unknown time     fish oil-omega-3 fatty acids 1000 MG capsule Take 2 g by mouth 2 times daily   3/14/2018 at Unknown time     acetylcysteine (N-ACETYL CYSTEINE) 600 MG CAPS capsule Take 1 capsule (600 mg) by mouth 2 times  daily 60 capsule 1 3/14/2018 at Unknown time     progesterone (PROMETRIUM) 100 MG capsule TAKE 1 CAPSULE BY MOUTH EVERY NIGHT AT BEDTIME AND INSERT 1 CAPSULE VAGINALLY EVERY NIGHT AT BEDTIME (Patient not taking: Reported on 2/21/2018) 60 capsule 5 More than a month at Unknown time   .        Maternal Past Medical History:     Past Medical History:   Diagnosis Date     Abnormal cervical Papanicolaou smear 2010     PCOS (polycystic ovarian syndrome) 2017     Premenstrual syndrome 2017                       Family History:     Family History   Problem Relation Age of Onset     Thyroid Cancer Mother      Hyperlipidemia Father      Family History Negative Sister      Family History Negative Brother      Other - See Comments Maternal Grandmother      depression     Other - See Comments Maternal Grandfather      old age     Other - See Comments Paternal Grandmother      old age     Family History Negative Brother      Other - See Comments Paternal Grandfather      old age               Social History:     Social History   Substance Use Topics     Smoking status: Former Smoker     Packs/day: 0.20     Years: 6.00     Types: Cigarettes     Smokeless tobacco: Never Used      Comment: quit 2010     Alcohol use Yes      Comment: 2-3x/month            Review of Systems:   Review of systems negative except as stated above.         Physical Exam:   Vitals were reviewed  Blood pressure 117/76, pulse 92, temperature 97.8  F (36.6  C), temperature source Oral, resp. rate 16, last menstrual period 06/06/2017, SpO2 97 %, not currently breastfeeding.  Constitutional:   awake, alert, cooperative, no apparent distress, and appears stated age   Eyes:   Lids and lashes normal, pupils equal, round and reactive to light, extra ocular muscles intact, sclera clear, conjunctiva normal   ENT:   Normocephalic, without obvious abnormality, atramatic, external ears without lesions, oral pharynx with moist mucus membranes, tonsils without erythema or  exudates, gums normal and good dentition.   Neck:   Supple, symmetrical, trachea midline, no adenopathy, thyroid symmetric, not enlarged and no tenderness, skin normal   Hematologic / Lymphatic:   no cervical lymphadenopathy   Lungs:   No increased work of breathing, good air exchange, clear to auscultation bilaterally, no crackles or wheezing   Cardiovascular:   Normal apical impulse, regular rate and rhythm, normal S1 and S2, no S3 or S4, and no murmur noted   Abdomen:   No scars, normal bowel sounds, soft, non-distended, non-tender, no masses palpated, no hepatosplenomegally   Genitounirinary:   External Genitalia:  General appearance; normal  Vagina:  General appearance normal  Cervix:  See below  Uterus:  gravid   Musculoskeletal:   There is no redness, warmth, or swelling of the joints.  Full range of motion noted.  Motor strength is 5 out of 5 all extremities bilaterally.  Tone is normal.   Neurologic:   Awake, alert, oriented to name, place and time.  Cranial nerves II-XII are grossly intact.  Motor is 5 out of 5 bilaterally.  Sensory is intact.  gait is normal.   Neuropsychiatric:   General: normal, calm and normal eye contact  Level of consciousness: alert / normal  Orientation: oriented to self, place, time and situation   Skin:   no bruising or bleeding and no rashes      Cervix:   Membranes: SROM  clear amniotic fluid   Dilation: 9.5   Effacement: 100%   Station:+1   Consistency: average   Position: Mid  Presentation:Vertex  Fetal Heart Rate Tracing: reactive and reassuring  Tocometer: external monitor                       Assessment:   Viki Busch is a 40w2d pregnant female admitted with active labor management.  She was seen in clinic yesterday with marley bloody show, sent up to the floor and had minimal change, she elected to labor at home and came in around 2200 3/14/18 and was 4cm dilated, SROM at 0230 and at 0400 she was 7cm and electing for epidural.  On my arrival she was 9.5 cm and ITN  was decided for pain control.          Plan:   Admit - see IP orders  Anticipate     Ludy Reza MD  3/15/2018  4:41 AM

## 2018-03-15 NOTE — ANESTHESIA PROCEDURE NOTES
Peripheral nerve/Neuraxial procedure note : intrathecal  Pre-Procedure    Location: OB    Procedure Times:3/15/2018 4:49 AM and 3/15/2018 4:54 AM  Pre-Anesthestic Checklist: patient identified, IV checked, risks and benefits discussed, informed consent, monitors and equipment checked, pre-op evaluation, at physician/surgeon's request and post-op pain management    Timeout  Correct Patient: Yes   Correct Procedure: Yes   Correct Site: Yes   Correct Laterality: N/A   Correct Position: Yes   Site Marked: N/A   .   Procedure Documentation  ASA 2 and Emergent  .    Procedure:    Intrathecal.  Insertion Site:L3-4  (midline approach)      Patient Prep;mask, sterile gloves, povidone-iodine 7.5% surgical scrub, patient draped.  .  Needle: Lsely tip Spinal Needle (gauge): 25  Spinal/LP Needle Length (inches): 3.5 # of attempts: 1 and # of redirects:  Introducer used .       Assessment/Narrative  Paresthesias: No.  .  .  clear CSF fluid removed . Time Injected: 04:54  Sensory Level: T10 Comments:  No complications, tattoo unavoidable.

## 2018-03-15 NOTE — L&D DELIVERY NOTE
Delivery Summary    Viki Busch MRN# [id number]   Age: 32 year old YOB: 1985     ASSESSMENT & PLAN:  of viable female infant with apgars of 7 and 8 with 1st degree lac, minimal blood loss.        Labor Event Times    Labor onset date:  3/14/18 Onset time:   8:00 AM   Dilation complete date:  3/15/18 Complete time:   5:50 AM   Start pushing date/time:  3/15/2018 0556            Labor Events     labor?:  No    steroids:  None   Labor Type:  Spontaneous   Predominate monitoring during 1st stage:  intermittent electronic fetal monitoring      Antibiotics received during labor?:  No      Rupture identifier:  Rupture 1   Rupture date/time: 3/15/18 0236   Rupture type:  Spontaneous rupture of membranes occuring during spontaneous labor or augmentation   Fluid color:  Clear   Fluid odor:  Normal      1:1 continuous labor support provided by?:  RN          Delivery/Placenta Date and Time    Delivery Date:  3/15/18 Delivery Time:   6:17 AM   Placenta Date/Time:  3/15/2018  6:23 AM      Vaginal Counts    Initial count performed by 2 team members:   Two Team Members   VICTORIA RN    MK RN           [employer]   Initial counts 1  15 10   Added to count       Final counts 1  15 10      Placed during labor Accounted for at the end of labor   No    No    No       Final count performed by 2 team members:   Two Team Members   VICTORIA RN    KS RN          Final count correct?:  Yes         Labor Events and Shoulder Dystocia    Fetal Tracing Prior to Delivery:  Category 1, Category 2   Shoulder dystocia present?:  Neg            Delivery (Maternal) (Provider to Complete) (895901)    Episiotomy:  None   Perineal lacerations:  1st Repaired?:  No   Vaginal laceration?:  No    Cervical laceration?:  No    Est. blood loss (mL):  100         Mother's Information  Mother: Viki Busch #[id number]    Start of Mother's Information     IO Blood Loss  18 0800 - 03/15/18 0637     Mom's I/O Activity            End of Mother's Information  Mother: Viki Busch #[id number]            Delivery - Provider to Complete ([id number])    Delivering clinician:  LUDY ZAPATA   Attempted Delivery Types (Choose all that apply):  Spontaneous Vaginal Delivery   Delivery Type (Choose the 1 that will go to the Birth History):  Vaginal, Spontaneous Delivery                           Placenta    Delayed Cord Clamping:  Done   Date/Time:  3/15/2018  6:23 AM   Removal:  Spontaneous   Comments:  complete 3VC   Disposition:  Hospital disposal      Anesthesia    Method:  Intrathecal         Presentation and Position    Presentation:  Vertex   Position:  Left Occiput Anterior                    Ludy Zapata MD

## 2018-03-15 NOTE — PLAN OF CARE
Face to face report given with opportunity to observe patient.    Report given to Shaila Reyes   3/15/2018  7:12 AM

## 2018-03-15 NOTE — PLAN OF CARE
Labor Admission  Viki Busch  MRN: [id number]  Gestational Age: 40w1d      Viki Busch is admitted for active labor.  States sirisha since this AM .  Rates pain at 7/10.  Bloody show  began this AM.  Denies LOF.      Dr. Reza notified of arrival and condition and Intrapartum orders initiated.      FHT: 130  NST: Reactive.  Uterine Assessment: frequency q 7-9 minutes, Contractions: Strength, mild quality.     Patient is alert and oriented X 3,Patient oriented to room, unit, hourly rounding, and plan of care.  Call light within reach. Explained admission packet with patient bill of rights brochure. Will continue to monitor and document as needed.     Inpatient nursing criteria listed below was met:    Health care directives status obtained and documented: Yes  Patient identifies a surrogate decision maker: Yes   If yes, who: Ge   Core Measure diagnosis present:: No  Vaccine assessment done and vaccines ordered if appropriate. Yes  Clergy visit ordered if patient requests: N/A  Skin issues/needs documented:N/A  Isolation needs addressed, if appropriate: N/A  Fall Prevention (Med and High risk): Care plan updated, Education given and documented and signage used: N/A  Care Plan initiated: Yes  Education Documented (Reminder to educate patient if MRSA is present on admission): Yes  Education Assessment documented:Yes  Patient has discharge needs (If yes, please explain): No

## 2018-03-15 NOTE — PLAN OF CARE
Phone call was made to patient to check in, per provider request. Pt states she is sirisha Q. 10-25 minutes and does not feel the need to come in at this time. Pt states she will call to update L&D floor if anything changes, and/ or if her contractions become more regular.

## 2018-03-15 NOTE — PLAN OF CARE
Vaginal Delivery Note   of viable Female.  Nursery RN Ayanna BROOKE present.  Infant with spontaneous cry, to mother's abdomen, dried and stimulated. Kenzie cares provided.  Mother and baby in stable condition. Baby skin-to-skin with mom. ID bands placed on infant, mom and father.

## 2018-03-15 NOTE — PLAN OF CARE
Face to face report given with opportunity to observe patient.    Report given to Shaila Reyes   3/15/2018  7:13 AM

## 2018-03-15 NOTE — ANESTHESIA CARE TRANSFER NOTE
Patient: Viki Busch    * No procedures listed *    Diagnosis: * No pre-op diagnosis entered *  Diagnosis Additional Information: No value filed.    Anesthesia Type:   No value filed.     Note:  Airway :Room Air  Patient transferred to:Labor and Delivery  Handoff Report: Identifed the Patient, Identified the Reponsible Provider, Reviewed the pertinent medical history, Discussed the surgical course, Reviewed Intra-OP anesthesia mangement and issues during anesthesia, Set expectations for post-procedure period and Allowed opportunity for questions and acknowledgement of understanding      Vitals: (Last set prior to Anesthesia Care Transfer)              Electronically Signed By: HALLE Gaspar CRNA  March 15, 2018  5:03 AM

## 2018-03-16 VITALS
HEART RATE: 63 BPM | TEMPERATURE: 97.7 F | SYSTOLIC BLOOD PRESSURE: 115 MMHG | DIASTOLIC BLOOD PRESSURE: 84 MMHG | RESPIRATION RATE: 18 BRPM | OXYGEN SATURATION: 99 %

## 2018-03-16 LAB
HGB BLD-MCNC: 12.1 G/DL (ref 11.7–15.7)
T PALLIDUM IGG+IGM SER QL: NEGATIVE

## 2018-03-16 PROCEDURE — 85018 HEMOGLOBIN: CPT | Performed by: FAMILY MEDICINE

## 2018-03-16 PROCEDURE — 36415 COLL VENOUS BLD VENIPUNCTURE: CPT | Performed by: FAMILY MEDICINE

## 2018-03-16 NOTE — PROGRESS NOTES
Patient:   Viki  FOB:  Ge  Marital status:     Lives at: home in [place]  Lives with: Ge and 2 other kids  Support System: family and friends    Primary PCP:  Ludy Reza  OB:  [place]  Baby PCP: [place]  Insurance: [employer] [place]    Agency Contacts: none   Mental Health: no concerns  Substance Abuse: no concerns    Adequate resources for needs (housing, utilities, food/med): yes    Transportation:  She and Ge both drive  Car Seat: Yes  Diapers:  Yes    Education concerns on self/baby care:   Feels confident she can care for herself and     Community Resources: none additional

## 2018-03-16 NOTE — PLAN OF CARE
Problem: Postpartum (Vaginal Delivery) (Adult,Obstetrics,Pediatric)  Goal: Signs and Symptoms of Listed Potential Problems Will be Absent, Minimized or Managed (Postpartum)  Signs and symptoms of listed potential problems will be absent, minimized or managed by discharge/transition of care (reference Postpartum (Vaginal Delivery) (Adult,Obstetrics,Pediatric) CPG).     Temp: 97.5  F (36.4  C) Temp src: Oral BP: 129/68 Pulse: 63 Heart Rate: 70 Resp: 16 SpO2: 97 % O2 Device: None (Room air)        Pt denies pain, some cramping while breastfeeding, adequately controlled with ibuprofen. Small amount of lochia, no clots present. Ambulates independently to bathroom, voiding, passing gas. Baby appears to be breastfeeding well. No nipple cracks or bleeding.        Face to face report given with opportunity to observe patient.    Report given to Abdoul Christianson   3/15/2018  11:20 PM

## 2018-03-16 NOTE — PLAN OF CARE
Pt discharged home accompanied by spouse and baby.  Denies pain/discomfort.  AVS reviewed and signed.  Pt had no further questions.

## 2018-03-16 NOTE — DISCHARGE INSTRUCTIONS
Postpartum Vaginal Delivery Instructions    Activity    Ask family and friends for help when you need it.  Do not place anything in your vagina until approved by your Physician .  You are not restricted on other activities, but take it easy for a few weeks to allow your body to recover from delivery.  You are able to do any activities you feel up to that point.  No driving until you have stopped taking narcotic pain medications.    Call your health care provider if you have any of these symptoms:    Increased pain, swelling, redness, or fluid around your stiches from an episiotomy or perineal tear.  A fever above 100.4 F (38 C) with or without chills when placing a thermometer under your tongue.  You soak a sanitary pad with blood within 1 hour, or you see blood clots larger than a golf ball.  Bleeding that lasts more than 6 weeks.  Vaginal discharge that smells bad.  Severe pain, cramping or tenderness in your lower belly area.  A need to urinate more frequently (use the toilet more often), more urgently (use the toilet very quickly), or it burns when you urinate.  Nausea and vomiting.  Redness, swelling or pain around a vein in your leg.  Problems breastfeeding or a red or painful area on your breast.  Chest pain and cough or are gasping for air.  Problems coping with sadness, anxiety, or depression.  If you have any concerns about hurting yourself or the baby, call your provider immediately. The Safe Place for Newborns law allows you to bring your baby to the hospital up to 7 days, no questions asked.  You have questions or concerns after you return home.    Keep your hands clean:  Always wash your hands before touching your perineal area and stitches.  This helps reduce your risk of infection.  If your hands aren't dirty, you may use an alcohol hand-rub to clean your hands. Keep your nails clean and short.

## 2018-03-16 NOTE — DISCHARGE SUMMARY
Discharge Summary    Viki Busch MRN# [id number]   YOB: 1985 Age: 32 year old     Date of Admission:  3/14/2018  Date of Discharge:  3/16/2018  Admitting Physician:  Ludy Reza MD  Discharge Physician:  Ludy Reza MD  Discharging Service:  [facility] clinic: [facility]  Primary Provider: Ludy Reza          Admission Diagnoses:   Encounter for triage in pregnant patient  Normal labor and delivery          Discharge Diagnosis:   Patient Active Problem List   Diagnosis     PCOS (polycystic ovarian syndrome)     Premenstrual syndrome     Supervision of other normal pregnancy, antepartum     Encounter for triage in pregnant patient     Normal labor and delivery                Discharge Disposition:   Discharged to home           Condition on Discharge:   Discharge condition: Stable   Discharge vitals: Blood pressure 115/84, pulse 63, temperature 97.7  F (36.5  C), temperature source Oral, resp. rate 18, last menstrual period 06/06/2017, SpO2 99 %, unknown if currently breastfeeding.   Code status on discharge: Full Code           Procedures / Labs / Imaging:   No procedures performed during this admission          Medications Prior to Admission:     Prescriptions Prior to Admission   Medication Sig Dispense Refill Last Dose     vitamin D (ERGOCALCIFEROL) 83746 UNIT capsule Take 1 capsule (50,000 Units) by mouth every 7 days for 8 doses 4 capsule 1 3/14/2018 at Unknown time     progesterone (PROMETRIUM) 100 MG capsule TAKE 1 CAPSULE BY MOUTH EVERY NIGHT AT BEDTIME AND INSERT 1 CAPSULE VAGINALLY EVERY NIGHT AT BEDTIME 60 capsule 5 3/14/2018 at Unknown time     Prenatal Vit-Fe Fumarate-FA (PRENATAL FORMULA) 27-1 MG TABS Take 1 tablet daily 90 tablet 3 3/14/2018 at Unknown time     fish oil-omega-3 fatty acids 1000 MG capsule Take 2 g by mouth 2 times daily   3/14/2018 at Unknown time     acetylcysteine (N-ACETYL CYSTEINE) 600 MG CAPS capsule Take 1  capsule (600 mg) by mouth 2 times daily 60 capsule 1 3/14/2018 at Unknown time             Discharge Medications:     Current Facility-Administered Medications   Medication     [START ON 3/21/2018] vitamin D (ERGOCALCIFEROL) capsule 50,000 Units     oxytocin (PITOCIN) 30 units in 500 mL 0.9% NaCl infusion     ibuprofen (ADVIL/MOTRIN) tablet 800 mg     acetaminophen (TYLENOL) tablet 650 mg     naloxone (NARCAN) injection 0.1-0.4 mg     senna-docusate (SENOKOT-S;PERICOLACE) 8.6-50 MG per tablet 1 tablet    Or     senna-docusate (SENOKOT-S;PERICOLACE) 8.6-50 MG per tablet 2 tablet     [START ON 3/17/2018] bisacodyl (DULCOLAX) Suppository 10 mg     [START ON 3/17/2018] sodium phosphate (FLEET ENEMA) 1 enema     hydrocortisone 2.5 % cream     lanolin ointment     lactated ringers BOLUS 1,000 mL     oxytocin (PITOCIN) 30 units in 500 mL 0.9% NaCl infusion     oxytocin (PITOCIN) injection 10 Units     misoprostol (CYTOTEC) tablet 400 mcg     NO Rho (D) immune globulin (RhoGam) needed - mother Rh POSITIVE     No MMR Needed - Assessment: Patient does not need MMR vaccine     No Tdap Needed - Assessment: Patient does not need Tdap vaccine             Consultations:   No consultations were requested during this admission             Brief History of Illness:   Viki Busch is a 32 year old female who was admitted for active labor          Hospital Course:     Encounter for triage in pregnant patient    Normal labor and delivery    * No resolved hospital problems. *              Final Day of Progress before Discharge:       Assessment and Plan:  Active Problems:     Normal labor and delivery    Assessment: did very well with minimal blood loss    Plan: d/c to home                Physical Exam:  Vitals were reviewed  Blood pressure 115/84, pulse 63, temperature 97.7  F (36.5  C), temperature source Oral, resp. rate 18, last menstrual period 06/06/2017, SpO2 99 %, unknown if currently breastfeeding.  Constitutional:   awake,  alert, cooperative, no apparent distress, and appears stated age     Lungs:   No increased work of breathing, good air exchange, clear to auscultation bilaterally, no crackles or wheezing     Cardiovascular:   Normal apical impulse, regular rate and rhythm, normal S1 and S2, no S3 or S4, and no murmur noted     Neuropsychiatric:   General: normal, calm and normal eye contact  Affect: normal and pleasant     Skin:   no bruising or bleeding     Additional findings:   Fundus firm          Data:  All laboratory data reviewed         Significant Results:   None             Pending Results:   None           Discharge Instructions and Follow-Up:   Discharge diet: Regular   Discharge activity: Activity as tolerated   Discharge follow-up: Follow up with primary care provider in 6 weeks   Outpatient therapy: None    Home Care agency: None    Supplies and equipment: None   Lines and drains: None    Wound care: None   Other instructions: None      Ludy Reza MD

## 2018-03-16 NOTE — PLAN OF CARE
Face to face report given with opportunity to observe patient.    Report given to CARMEN Amin   3/16/2018  3:30 AM

## 2018-03-16 NOTE — PLAN OF CARE
Face to face report given with opportunity to observe patient.    Report given to Marilou Reyes   3/16/2018  6:44 AM

## 2018-03-16 NOTE — PLAN OF CARE
Problem: Postpartum (Vaginal Delivery) (Adult,Obstetrics,Pediatric)  Goal: Signs and Symptoms of Listed Potential Problems Will be Absent, Minimized or Managed (Postpartum)  Signs and symptoms of listed potential problems will be absent, minimized or managed by discharge/transition of care (reference Postpartum (Vaginal Delivery) (Adult,Obstetrics,Pediatric) CPG).   Outcome: Improving  Assessments completed as charted. B/P: 115/84, T: 97.7, P: 63, R: 18. Rates pain: 0/10. Voiding without difficulty. Fundus: Midline Firm. Lochia: Light. Activity: normal activity. Infant feeding: Breast feeding going well.     LATCH Score:   Latch: 2 - Good Latch  Audible Swallowin - Spontaneous & frequent  Type of Nipple: (Breast/Nipple) 2 - Everted  Comfort: 2 - Soft, Nontender  Hold: 2 - No Assist   Total LATCH Score: 10    Postpartum breastfeeding assessment completed and education provided, see Patient Education Activity.  Items included in the education are:     proper positioning and latch    effectiveness of feeding    manual expression    handling and storing breastmilk    maintenance of breastfeeding for the first 6 months    sign/symptoms of infant feeding issues requiring referral to qualified health care provider  Postpartum care education provided, see Patient Education activity. Patient denies needs. Will monitor.  Cathryn Dixon

## 2018-05-25 ENCOUNTER — OFFICE VISIT (OUTPATIENT)
Dept: FAMILY MEDICINE | Facility: OTHER | Age: 33
End: 2018-05-25
Attending: FAMILY MEDICINE
Payer: COMMERCIAL

## 2018-05-25 VITALS
SYSTOLIC BLOOD PRESSURE: 106 MMHG | WEIGHT: 165.5 LBS | TEMPERATURE: 96 F | HEART RATE: 68 BPM | DIASTOLIC BLOOD PRESSURE: 62 MMHG | RESPIRATION RATE: 18 BRPM | HEIGHT: 68 IN | OXYGEN SATURATION: 98 % | BODY MASS INDEX: 25.08 KG/M2

## 2018-05-25 DIAGNOSIS — N94.3 PREMENSTRUAL SYNDROME: ICD-10-CM

## 2018-05-25 DIAGNOSIS — E28.2 PCOS (POLYCYSTIC OVARIAN SYNDROME): ICD-10-CM

## 2018-05-25 PROBLEM — Z36.89 ENCOUNTER FOR TRIAGE IN PREGNANT PATIENT: Status: RESOLVED | Noted: 2018-03-14 | Resolved: 2018-05-25

## 2018-05-25 PROBLEM — Z34.80 SUPERVISION OF OTHER NORMAL PREGNANCY, ANTEPARTUM: Status: RESOLVED | Noted: 2017-09-29 | Resolved: 2018-05-25

## 2018-05-25 PROCEDURE — 99207 ZZC POST PARTUM EXAM: CPT | Performed by: FAMILY MEDICINE

## 2018-05-25 ASSESSMENT — ANXIETY QUESTIONNAIRES
2. NOT BEING ABLE TO STOP OR CONTROL WORRYING: NOT AT ALL
IF YOU CHECKED OFF ANY PROBLEMS ON THIS QUESTIONNAIRE, HOW DIFFICULT HAVE THESE PROBLEMS MADE IT FOR YOU TO DO YOUR WORK, TAKE CARE OF THINGS AT HOME, OR GET ALONG WITH OTHER PEOPLE: NOT DIFFICULT AT ALL
1. FEELING NERVOUS, ANXIOUS, OR ON EDGE: NOT AT ALL
6. BECOMING EASILY ANNOYED OR IRRITABLE: NOT AT ALL
3. WORRYING TOO MUCH ABOUT DIFFERENT THINGS: NOT AT ALL
7. FEELING AFRAID AS IF SOMETHING AWFUL MIGHT HAPPEN: NOT AT ALL
5. BEING SO RESTLESS THAT IT IS HARD TO SIT STILL: NOT AT ALL
GAD7 TOTAL SCORE: 0

## 2018-05-25 ASSESSMENT — PATIENT HEALTH QUESTIONNAIRE - PHQ9: 5. POOR APPETITE OR OVEREATING: NOT AT ALL

## 2018-05-25 ASSESSMENT — PAIN SCALES - GENERAL: PAINLEVEL: NO PAIN (0)

## 2018-05-25 NOTE — MR AVS SNAPSHOT
"              After Visit Summary   5/25/2018    Viki Busch    MRN: [id number]           Patient Information     Date Of Birth          1985        Visit Information        Provider Department      5/25/2018 3:30 PM [facility]        Today's Diagnoses     Routine postpartum follow-up    -  1    Premenstrual syndrome        PCOS (polycystic ovarian syndrome)           Follow-ups after your visit        Who to contact     If you have questions or need follow up information about today's clinic visit or your schedule please contact [facility] directly at [phone].  Normal or non-critical lab and imaging results will be communicated to you by MyChart, letter or phone within 4 business days after the clinic has received the results. If you do not hear from us within 7 days, please contact the clinic through [employer] or phone. If you have a critical or abnormal lab result, we will notify you by phone as soon as possible.  Submit refill requests through [employer] or call your pharmacy and they will forward the refill request to us. Please allow 3 business days for your refill to be completed.          Additional Information About Your Visit        MyChart Information     [employer] gives you secure access to your electronic health record. If you see a primary care provider, you can also send messages to your care team and make appointments. If you have questions, please call your primary care clinic.  If you do not have a primary care provider, please call [phone] and they will assist you.        Care EveryWhere ID     This is your Care EveryWhere ID. This could be used by other organizations to access your [facility] medical records  [id number]        Your Vitals Were     Pulse Temperature Respirations Height Last Period Pulse Oximetry    68 96  F (35.6  C) (Tympanic) 18 5' 8\" (1.727 m) 06/06/2017 98%    BMI (Body Mass Index)                   25.16 kg/m2    "         Blood Pressure from Last 3 Encounters:   05/25/18 106/62   03/16/18 115/84   03/14/18 122/77    Weight from Last 3 Encounters:   05/25/18 165 lb 8 oz (75.1 kg)   03/14/18 197 lb 3.2 oz (89.4 kg)   03/07/18 195 lb 4 oz (88.6 kg)              Today, you had the following     No orders found for display       Primary Care Provider Office Phone # Fax #    Ludy CONCEPCION Reza [phone]       [facility] [place] [address]  [place] [address]        Equal Access to Services     [facility]: Hadii aad ku hadasho Soomaali, waaxda luqadaha, qaybta kaalmada adeegyada, sergo kearney hayvandanan ryan grossman . So [facility] [phone].    ATENCIÓN: Si habla español, tiene a guerrero disposición servicios gratuitos de asistencia lingüística. Llame al [phone].    We comply with applicable federal civil rights laws and [place] laws. We do not discriminate on the basis of race, color, national origin, age, disability, sex, sexual orientation, or gender identity.            Thank you!     Thank you for choosing [facility]  for your care. Our goal is always to provide you with excellent care. Hearing back from our patients is one way we can continue to improve our services. Please take a few minutes to complete the written survey that you may receive in the mail after your visit with us. Thank you!             Your Updated Medication List - Protect others around you: Learn how to safely use, store and throw away your medicines at www.disposemymeds.org.          This list is accurate as of 5/25/18  6:30 PM.  Always use your most recent med list.                   Brand Name Dispense Instructions for use Diagnosis    fish oil-omega-3 fatty acids 1000 MG capsule      Take 2 g by mouth 2 times daily        PRENATAL FORMULA 27-1 MG Tabs     90 tablet    Take 1 tablet daily    Supervision of other normal pregnancy, antepartum

## 2018-05-25 NOTE — NURSING NOTE
"Chief Complaint   Patient presents with     Post Partum Exam       Initial /62 (BP Location: Right arm, Patient Position: Sitting, Cuff Size: Adult Regular)  Pulse 68  Temp 96  F (35.6  C) (Tympanic)  Resp 18  Ht 5' 8\" (1.727 m)  Wt 165 lb 8 oz (75.1 kg)  LMP 06/06/2017  SpO2 98%  BMI 25.16 kg/m2 Estimated body mass index is 25.16 kg/(m^2) as calculated from the following:    Height as of this encounter: 5' 8\" (1.727 m).    Weight as of this encounter: 165 lb 8 oz (75.1 kg).  Medication Reconciliation: complete    Starla Sow MA  "

## 2018-05-25 NOTE — PROGRESS NOTES
SUBJECTIVE:                                                    Viki Busch is a 32 year old female who presents to clinic today for the following health issues:  Post Partum Visit -                   Delivery date: 03/15/18      Patient had a  of viable girl, weight 8 lbs 6 ozs,           with none complications.          Accompanying Signs & Symptoms:                        Breast feeding: breastfeeding going well, every 1-3 hrs, 8-12 times/24 hours and pumped breastmilk by bottle   Abdominal pain: no                       Bleeding since delivery: YES        Contraception choice: none        Patient has had intercourse since delivery              and complains of No discomfort.        Last PAP:   Lab Results   Component Value Date    PAP NIL 2017           Pt screened for postpartum depression and complaints are: NEGATIVE         PHQ9= 0        JEROD= 0    Female History and Physical for post partum     Viki Busch MRN# [id number]   YOB: 1985 Age: 32 year old     Primary care provider: Ludy Reza                 Chief Complaint:   History is obtained from the patient         History of Present Illness:   This patient is a 32 year old female who presents for post partum visit  Has been bleeding more irregularly             Past Medical History:     Past Medical History:   Diagnosis Date     Abnormal cervical Papanicolaou smear      PCOS (polycystic ovarian syndrome) 2017     Premenstrual syndrome 2017             Past Surgical History:     Past Surgical History:   Procedure Laterality Date     HC TOOTH EXTRACTION W/FORCEP Bilateral 2016              Gynecologic History:   Patient's last menstrual period was 2017.            Obstetrical History:   See Epic         Social History:     Social History   Substance Use Topics     Smoking status: Former Smoker     Packs/day: 0.20     Years: 6.00     Types: Cigarettes     Smokeless tobacco: Never Used      Comment:  "quit 2010     Alcohol use Yes      Comment: 2-3x/month             Family History:     Family History   Problem Relation Age of Onset     Thyroid Cancer Mother      Hyperlipidemia Father      Family History Negative Sister      Family History Negative Brother      Other - See Comments Maternal Grandmother      depression     Other - See Comments Maternal Grandfather      old age     Other - See Comments Paternal Grandmother      old age     Family History Negative Brother      Other - See Comments Paternal Grandfather      old age             Immunizations:     Immunization History   Administered Date(s) Administered     Influenza Vaccine IM 3yrs+ 4 Valent IIV4 02/14/2018     TDAP Vaccine (Adacel) 04/07/2017, 12/20/2017            Allergies:   No Known Allergies          Medications:     Current Outpatient Prescriptions:      fish oil-omega-3 fatty acids 1000 MG capsule, Take 2 g by mouth 2 times daily, Disp: , Rfl:      Prenatal Vit-Fe Fumarate-FA (PRENATAL FORMULA) 27-1 MG TABS, Take 1 tablet daily, Disp: 90 tablet, Rfl: 3            Review of Systems:   A comprehensive, 10 point review of systems was performed and found to be negative              Physical Exam:   Vitals were reviewed  Blood pressure 106/62, pulse 68, temperature 96  F (35.6  C), temperature source Tympanic, resp. rate 18, height 5' 8\" (1.727 m), weight 165 lb 8 oz (75.1 kg), last menstrual period 06/06/2017, SpO2 98 %, unknown if currently breastfeeding.  Constitutional:   awake, alert, cooperative, no apparent distress, and appears stated age   Eyes:   Lids and lashes normal, pupils equal, round and reactive to light, extra ocular muscles intact, sclera clear, conjunctiva normal   ENT:   Normocephalic, without obvious abnormality, atramatic, external ears without lesions, oral pharynx with moist mucus membranes, tonsils without erythema or exudates, gums normal and good dentition.   Neck:   Supple, symmetrical, trachea midline, no adenopathy, " thyroid symmetric, not enlarged and no tenderness, skin normal   Hematologic / Lymphatic:   no cervical lymphadenopathy   Lungs:   No increased work of breathing, good air exchange, clear to auscultation bilaterally, no crackles or wheezing   Cardiovascular:   Normal apical impulse, regular rate and rhythm, normal S1 and S2, no S3 or S4, and no murmur noted   Abdomen:   No scars, normal bowel sounds, soft, non-distended, non-tender, no masses palpated, no hepatosplenomegally   Chest / Breast:   Breasts symmetrical, skin without lesion(s), no nipple retraction or dimpling, no nipple discharge, no masses palpated, no axillary or supraclavicular adenopathy   Genitounirinary:   External Genitalia:  General appearance; normal  Urethra:  Fullness absent, Masses absent  Bladder:  Fullness absent, Masses absent  Vagina:  General appearance normal  Cervix:  Palpated wnl  Uterus:  Size normal, Contour normal, Position normal, Masses absent  Adenexa:  Masses absent, Tenderness absent, Enlargement absent   Musculoskeletal:   There is no redness, warmth, or swelling of the joints.  Full range of motion noted.  Motor strength is 5 out of 5 all extremities bilaterally.  Tone is normal.   Neurologic:   Awake, alert, oriented to name, place and time.  Cranial nerves II-XII are grossly intact.  Motor is 5 out of 5 bilaterally.  Sensory is intact.  and gait is normal.   Neuropsychiatric:   General: normal, calm and normal eye contact  Level of consciousness: alert / normal  Affect: normal and pleasant  Orientation: oriented to self, place, time and situation  Memory and insight: normal, memory for past and recent events intact and thought process normal   Skin:   no bruising or bleeding, Body Locations:  No rashes          Data:   No visits with results within 1 Day(s) from this visit.  Latest known visit with results is:    Admission on 03/14/2018, Discharged on 03/16/2018   Component Date Value Ref Range Status     Treponema  pallidum Antibody 03/14/2018 Negative  NEG^Negative Final     ABO 03/14/2018 A   Final     RH(D) 03/14/2018 Pos   Final     Specimen Expires 03/14/2018 03/17/2018   Final     WBC 03/14/2018 8.6  4.0 - 11.0 10e9/L Final     RBC Count 03/14/2018 3.99  3.8 - 5.2 10e12/L Final     Hemoglobin 03/14/2018 12.7  11.7 - 15.7 g/dL Final     Hematocrit 03/14/2018 36.0  35.0 - 47.0 % Final     MCV 03/14/2018 90  78 - 100 fl Final     MCH 03/14/2018 31.8  26.5 - 33.0 pg Final     MCHC 03/14/2018 35.3  31.5 - 36.5 g/dL Final     RDW 03/14/2018 13.3  10.0 - 15.0 % Final     Platelet Count 03/14/2018 205  150 - 450 10e9/L Final     Hemoglobin 03/16/2018 12.1  11.7 - 15.7 g/dL Final   ]         Assessment and Plan:   (Z39.2) Routine postpartum follow-up  (primary encounter diagnosis)  Comment:   Plan: f/u 1 year for CPE  F/u for FABM    (N94.3) Premenstrual syndrome  Comment:   Plan: re-start vitamins    (E28.2) PCOS (polycystic ovarian syndrome)  Comment:   Plan: restart 1 NAC daily  F/u if problems       Patient was agreeable to this plan and had no further questions.    Ludy Reza MD  5/25/2018  6:27 PM

## 2018-05-26 ASSESSMENT — ANXIETY QUESTIONNAIRES: GAD7 TOTAL SCORE: 0

## 2018-05-26 ASSESSMENT — PATIENT HEALTH QUESTIONNAIRE - PHQ9: SUM OF ALL RESPONSES TO PHQ QUESTIONS 1-9: 0

## 2018-06-27 ENCOUNTER — MYC MEDICAL ADVICE (OUTPATIENT)
Dept: FAMILY MEDICINE | Facility: OTHER | Age: 33
End: 2018-06-27

## 2019-07-30 NOTE — PROGRESS NOTES
Subjective     Viki Busch is a 34 year old female who presents to clinic today for the following health issues:    HPI   Family Planning/ PCOS      Duration: haven't started trying yet. Would like to have a fall baby(trying in NOV, DEC). pcos-  Since 2017    Description (location/character/radiation): spotting a week before, pretty bad cramps first few days     Intensity:  mild, moderate    Accompanying signs and symptoms: spotting a week before period starts, first few days super heavy flow, lower back pain, 6 day cycle.     History (similar episodes/previous evaluation): yes.     Precipitating or alleviating factors: not taking anything for quite some time, has gained weight also    Therapies tried and outcome: NAC, ibuprofen.       Patient Active Problem List   Diagnosis     PCOS (polycystic ovarian syndrome)     Premenstrual syndrome     Hypovitaminosis D     Past Surgical History:   Procedure Laterality Date     HC TOOTH EXTRACTION W/FORCEP Bilateral 06/01/2016       Social History     Tobacco Use     Smoking status: Former Smoker     Packs/day: 0.20     Years: 6.00     Pack years: 1.20     Types: Cigarettes     Smokeless tobacco: Never Used     Tobacco comment: quit 2010   Substance Use Topics     Alcohol use: Yes     Comment: 2-3x/month     Family History   Problem Relation Age of Onset     Thyroid Cancer Mother      Hyperlipidemia Father      Family History Negative Sister      Family History Negative Brother      Other - See Comments Maternal Grandmother         depression     Other - See Comments Maternal Grandfather         old age     Other - See Comments Paternal Grandmother         old age     Family History Negative Brother      Other - See Comments Paternal Grandfather         old age         Current Outpatient Medications   Medication Sig Dispense Refill     acetylcysteine (N-ACETYL CYSTEINE) 600 MG CAPS capsule Take 1 capsule (600 mg) by mouth 2 times daily 180 capsule 1     Multiple  "Vitamins-Minerals (OPTIVITE P.M.T.) TABS Take 3 tablets by mouth 2 times daily 540 tablet 3     Omega-3 Fatty Acids (FISH OIL-OMEGA-3 FATTY ACID) 1000 MG DR capsule Take 1 capsule (1 g) by mouth 2 times daily 180 capsule 1     No Known Allergies  BP Readings from Last 3 Encounters:   08/05/19 112/82   05/25/18 106/62   03/16/18 115/84    Wt Readings from Last 3 Encounters:   08/05/19 73.9 kg (163 lb)   05/25/18 75.1 kg (165 lb 8 oz)   03/14/18 89.4 kg (197 lb 3.2 oz)        Reviewed and updated as needed this visit by Provider         Review of Systems   ROS COMP: Constitutional, HEENT, cardiovascular, pulmonary, gi and gu systems are negative, except as otherwise noted.      Objective    /82 (BP Location: Left arm, Patient Position: Sitting, Cuff Size: Adult Regular)   Pulse 83   Temp 96.8  F (36  C)   Ht 1.727 m (5' 8\")   Wt 73.9 kg (163 lb)   SpO2 99%   BMI 24.78 kg/m    Body mass index is 24.78 kg/m .  Physical Exam   GENERAL: healthy, alert and no distress  NECK: no adenopathy, no asymmetry, masses, or scars and thyroid normal to palpation  RESP: lungs clear to auscultation - no rales, rhonchi or wheezes  CV: regular rate and rhythm, normal S1 S2, no S3 or S4, no murmur, click or rub, no peripheral edema and peripheral pulses strong  MS: no gross musculoskeletal defects noted, no edema  PSYCH: mentation appears normal, affect normal/bright    Diagnostic Test Results:  Labs reviewed in Epic  Results for orders placed or performed during the hospital encounter of 03/14/18   Anti Treponema   Result Value Ref Range    Treponema pallidum Antibody Negative NEG^Negative   CBC with platelets   Result Value Ref Range    WBC 8.6 4.0 - 11.0 10e9/L    RBC Count 3.99 3.8 - 5.2 10e12/L    Hemoglobin 12.7 11.7 - 15.7 g/dL    Hematocrit 36.0 35.0 - 47.0 %    MCV 90 78 - 100 fl    MCH 31.8 26.5 - 33.0 pg    MCHC 35.3 31.5 - 36.5 g/dL    RDW 13.3 10.0 - 15.0 %    Platelet Count 205 150 - 450 10e9/L   Hemoglobin "   Result Value Ref Range    Hemoglobin 12.1 11.7 - 15.7 g/dL   ABO and Rh   Result Value Ref Range    ABO A     RH(D) Pos     Specimen Expires 03/17/2018            Assessment & Plan     (E28.2) PCOS (polycystic ovarian syndrome)  (primary encounter diagnosis)  Comment:   Plan: Multiple Vitamins-Minerals (OPTIVITE P.M.T.)         TABS, DISCONTINUED: Multiple Vitamins-Minerals         (OPTIVITE P.M.T.) TABS        Restart vitamins, get back into healthy activity, fruits and veg and lean protein for diet  Follow-up 3 mos, sooner if spotting doesn't improve    (N94.3) Premenstrual syndrome  Comment:   Plan: restart vitamins    (E55.9) Hypovitaminosis D  Comment:   Plan: Vitamin D Deficiency        pending results will recommend routine dose       Patient was agreeable to this plan and had no further questions.  Patient Instructions   ([facility] or [facility] care)    1.  Start charting   2.  Start optivite PMT 2x/day  3.  Fish oil 2000mg daily  4.  NAC 2x/day      No follow-ups on file.    Ludy Reza MD  [facility] [place]

## 2019-08-05 ENCOUNTER — OFFICE VISIT (OUTPATIENT)
Dept: FAMILY MEDICINE | Facility: OTHER | Age: 34
End: 2019-08-05
Attending: FAMILY MEDICINE
Payer: COMMERCIAL

## 2019-08-05 VITALS
HEART RATE: 83 BPM | BODY MASS INDEX: 24.71 KG/M2 | DIASTOLIC BLOOD PRESSURE: 82 MMHG | OXYGEN SATURATION: 99 % | HEIGHT: 68 IN | SYSTOLIC BLOOD PRESSURE: 112 MMHG | TEMPERATURE: 96.8 F | WEIGHT: 163 LBS

## 2019-08-05 DIAGNOSIS — N94.3 PREMENSTRUAL SYNDROME: ICD-10-CM

## 2019-08-05 DIAGNOSIS — E55.9 HYPOVITAMINOSIS D: ICD-10-CM

## 2019-08-05 DIAGNOSIS — E28.2 PCOS (POLYCYSTIC OVARIAN SYNDROME): Primary | ICD-10-CM

## 2019-08-05 PROCEDURE — 99000 SPECIMEN HANDLING OFFICE-LAB: CPT | Performed by: FAMILY MEDICINE

## 2019-08-05 PROCEDURE — 36415 COLL VENOUS BLD VENIPUNCTURE: CPT | Performed by: FAMILY MEDICINE

## 2019-08-05 PROCEDURE — 82306 VITAMIN D 25 HYDROXY: CPT | Mod: 90 | Performed by: FAMILY MEDICINE

## 2019-08-05 PROCEDURE — 99214 OFFICE O/P EST MOD 30 MIN: CPT | Performed by: FAMILY MEDICINE

## 2019-08-05 RX ORDER — MULTIVITAMIN WITH MINERALS
3 TABLET ORAL 2 TIMES DAILY
Qty: 540 TABLET | Refills: 3 | COMMUNITY
Start: 2019-08-05 | End: 2021-12-16

## 2019-08-05 RX ORDER — CIDER VINEGAR 300 MG
1 TABLET ORAL 2 TIMES DAILY
Qty: 180 CAPSULE | Refills: 1 | COMMUNITY
Start: 2019-08-05 | End: 2021-12-16

## 2019-08-05 RX ORDER — MULTIVITAMIN WITH MINERALS
3 TABLET ORAL 2 TIMES DAILY
Qty: 540 TABLET | Refills: 3 | Status: SHIPPED | OUTPATIENT
Start: 2019-08-05 | End: 2019-08-05

## 2019-08-05 ASSESSMENT — ANXIETY QUESTIONNAIRES
2. NOT BEING ABLE TO STOP OR CONTROL WORRYING: NOT AT ALL
6. BECOMING EASILY ANNOYED OR IRRITABLE: SEVERAL DAYS
7. FEELING AFRAID AS IF SOMETHING AWFUL MIGHT HAPPEN: NOT AT ALL
1. FEELING NERVOUS, ANXIOUS, OR ON EDGE: NOT AT ALL
GAD7 TOTAL SCORE: 1
4. TROUBLE RELAXING: NOT AT ALL
3. WORRYING TOO MUCH ABOUT DIFFERENT THINGS: NOT AT ALL
IF YOU CHECKED OFF ANY PROBLEMS ON THIS QUESTIONNAIRE, HOW DIFFICULT HAVE THESE PROBLEMS MADE IT FOR YOU TO DO YOUR WORK, TAKE CARE OF THINGS AT HOME, OR GET ALONG WITH OTHER PEOPLE: NOT DIFFICULT AT ALL
5. BEING SO RESTLESS THAT IT IS HARD TO SIT STILL: NOT AT ALL

## 2019-08-05 ASSESSMENT — MIFFLIN-ST. JEOR: SCORE: 1482.86

## 2019-08-05 ASSESSMENT — PATIENT HEALTH QUESTIONNAIRE - PHQ9: SUM OF ALL RESPONSES TO PHQ QUESTIONS 1-9: 1

## 2019-08-05 ASSESSMENT — PAIN SCALES - GENERAL: PAINLEVEL: NO PAIN (0)

## 2019-08-05 NOTE — NURSING NOTE
"Chief Complaint   Patient presents with     pcos       Initial /82 (BP Location: Left arm, Patient Position: Sitting, Cuff Size: Adult Regular)   Pulse 83   Temp 96.8  F (36  C)   Ht 1.727 m (5' 8\")   Wt 73.9 kg (163 lb)   SpO2 99%   BMI 24.78 kg/m   Estimated body mass index is 24.78 kg/m  as calculated from the following:    Height as of this encounter: 1.727 m (5' 8\").    Weight as of this encounter: 73.9 kg (163 lb).  Medication Reconciliation: complete   Mine Haley    "

## 2019-08-05 NOTE — PATIENT INSTRUCTIONS
(KAYLA  [facility] or [facility] care)    1.  Start charting   2.  Start optivite PMT 2x/day  3.  Fish oil 2000mg daily  4.  NAC 2x/day

## 2019-08-06 LAB — DEPRECATED CALCIDIOL+CALCIFEROL SERPL-MC: 23 UG/L (ref 20–75)

## 2019-08-06 ASSESSMENT — ANXIETY QUESTIONNAIRES: GAD7 TOTAL SCORE: 1

## 2019-10-25 ENCOUNTER — MYC MEDICAL ADVICE (OUTPATIENT)
Dept: FAMILY MEDICINE | Facility: OTHER | Age: 34
End: 2019-10-25

## 2019-10-28 ENCOUNTER — OFFICE VISIT (OUTPATIENT)
Dept: FAMILY MEDICINE | Facility: OTHER | Age: 34
End: 2019-10-28
Attending: FAMILY MEDICINE
Payer: COMMERCIAL

## 2019-10-28 ENCOUNTER — TELEPHONE (OUTPATIENT)
Dept: FAMILY MEDICINE | Facility: OTHER | Age: 34
End: 2019-10-28

## 2019-10-28 VITALS
WEIGHT: 169 LBS | TEMPERATURE: 97.9 F | RESPIRATION RATE: 20 BRPM | BODY MASS INDEX: 25.7 KG/M2 | DIASTOLIC BLOOD PRESSURE: 80 MMHG | SYSTOLIC BLOOD PRESSURE: 120 MMHG | HEART RATE: 112 BPM | OXYGEN SATURATION: 99 %

## 2019-10-28 DIAGNOSIS — Z34.81 ENCOUNTER FOR SUPERVISION OF OTHER NORMAL PREGNANCY, FIRST TRIMESTER: ICD-10-CM

## 2019-10-28 DIAGNOSIS — Z32.01 POSITIVE PREGNANCY TEST: Primary | ICD-10-CM

## 2019-10-28 DIAGNOSIS — R79.89 LOW SERUM PROGESTERONE: ICD-10-CM

## 2019-10-28 LAB — HCG UR QL: POSITIVE

## 2019-10-28 PROCEDURE — 99213 OFFICE O/P EST LOW 20 MIN: CPT | Performed by: FAMILY MEDICINE

## 2019-10-28 PROCEDURE — 84144 ASSAY OF PROGESTERONE: CPT | Performed by: FAMILY MEDICINE

## 2019-10-28 PROCEDURE — 81025 URINE PREGNANCY TEST: CPT | Performed by: FAMILY MEDICINE

## 2019-10-28 PROCEDURE — 36415 COLL VENOUS BLD VENIPUNCTURE: CPT | Performed by: FAMILY MEDICINE

## 2019-10-28 RX ORDER — PRENATAL VIT/IRON FUM/FOLIC AC 27MG-0.8MG
1 TABLET ORAL DAILY
Qty: 90 TABLET | Refills: 3 | Status: SHIPPED | OUTPATIENT
Start: 2019-10-28 | End: 2021-01-04

## 2019-10-28 ASSESSMENT — PAIN SCALES - GENERAL: PAINLEVEL: NO PAIN (0)

## 2019-10-28 NOTE — NURSING NOTE
"Chief Complaint   Patient presents with     Confirmation Of Pregnancy       Initial /80 (BP Location: Left arm, Patient Position: Chair, Cuff Size: Adult Regular)   Pulse 112   Temp 97.9  F (36.6  C) (Tympanic)   Resp 20   Wt 76.7 kg (169 lb)   LMP 09/25/2019   SpO2 99%   BMI 25.70 kg/m   Estimated body mass index is 25.7 kg/m  as calculated from the following:    Height as of 8/5/19: 1.727 m (5' 8\").    Weight as of this encounter: 76.7 kg (169 lb).  Medication Reconciliation: complete  Terri Guerrero LPN    "

## 2019-10-28 NOTE — TELEPHONE ENCOUNTER
"Has had two positive pregnancy tests.  Last pregnancy she was low progesterone and is wondering if she should come in and get lab work completed.  States \"she told me that when I become pregnant to give her a call\"      [phone]   Please advise on lab work   "

## 2019-10-28 NOTE — TELEPHONE ENCOUNTER
Could see her this afternoon at 300/315  Don't need 30 for the one patient and adia was already seen

## 2019-10-28 NOTE — PROGRESS NOTES
Subjective     Viki Busch is a 34 year old female who presents to clinic today for the following health issues:    HPI   Confirm Pregnancy      Duration: LMP- 9-25-19    Description (location/character/radiation): had two positive pregnancy tests at home    Accompanying signs and symptoms: Missed menses    History (similar episodes/previous evaluation): None    Precipitating or alleviating factors: None    Therapies tried and outcome: None         Patient Active Problem List   Diagnosis     PCOS (polycystic ovarian syndrome)     Premenstrual syndrome     Hypovitaminosis D     Encounter for supervision of other normal pregnancy, first trimester     Low serum progesterone     Past Surgical History:   Procedure Laterality Date     HC TOOTH EXTRACTION W/FORCEP Bilateral 06/01/2016       Social History     Tobacco Use     Smoking status: Former Smoker     Packs/day: 0.20     Years: 6.00     Pack years: 1.20     Types: Cigarettes     Smokeless tobacco: Never Used     Tobacco comment: quit 2010   Substance Use Topics     Alcohol use: Yes     Comment: 2-3x/month     Family History   Problem Relation Age of Onset     Thyroid Cancer Mother      Hyperlipidemia Father      Family History Negative Sister      Family History Negative Brother      Other - See Comments Maternal Grandmother         depression     Other - See Comments Maternal Grandfather         old age     Other - See Comments Paternal Grandmother         old age     Family History Negative Brother      Other - See Comments Paternal Grandfather         old age         Current Outpatient Medications   Medication Sig Dispense Refill     Multiple Vitamins-Minerals (OPTIVITE P.M.T.) TABS Take 3 tablets by mouth 2 times daily 540 tablet 3     Omega-3 Fatty Acids (FISH OIL-OMEGA-3 FATTY ACID) 1000 MG DR capsule Take 1 capsule (1 g) by mouth 2 times daily 180 capsule 1     Prenatal Vit-Fe Fumarate-FA (PRENATAL MULTIVITAMIN W/IRON) 27-0.8 MG tablet Take 1 tablet by  mouth daily 90 tablet 3     progesterone (PROMETRIUM) 100 MG capsule Place 1 capsule (100 mg) vaginally At Bedtime And take 1 capsule Po  capsule 1     acetylcysteine (N-ACETYL CYSTEINE) 600 MG CAPS capsule Take 1 capsule (600 mg) by mouth 2 times daily 180 capsule 1     No Known Allergies  BP Readings from Last 3 Encounters:   10/28/19 120/80   08/05/19 112/82   05/25/18 106/62    Wt Readings from Last 3 Encounters:   10/28/19 76.7 kg (169 lb)   08/05/19 73.9 kg (163 lb)   05/25/18 75.1 kg (165 lb 8 oz)         Reviewed and updated as needed this visit by Provider         Review of Systems   ROS COMP: Constitutional, HEENT, cardiovascular, pulmonary, gi and gu systems are negative, except as otherwise noted.      Objective    /80 (BP Location: Left arm, Patient Position: Chair, Cuff Size: Adult Regular)   Pulse 112   Temp 97.9  F (36.6  C) (Tympanic)   Resp 20   Wt 76.7 kg (169 lb)   LMP 09/25/2019   SpO2 99%   BMI 25.70 kg/m    Body mass index is 25.7 kg/m .  Physical Exam   GENERAL: healthy, alert and no distress  RESP: lungs clear to auscultation - no rales, rhonchi or wheezes  CV: regular rate and rhythm, normal S1 S2, no S3 or S4, no murmur, click or rub, no peripheral edema and peripheral pulses strong  PSYCH: mentation appears normal, affect normal/bright    Diagnostic Test Results:  Labs reviewed in Epic  Results for orders placed or performed in visit on 10/28/19   HCG qualitative urine   Result Value Ref Range    HCG Qual Urine Positive (A) NEG^Negative           Assessment & Plan     (Z32.01) Positive pregnancy test  (primary encounter diagnosis)  Comment:   Plan: HCG qualitative urine          (Z34.81) Encounter for supervision of other normal pregnancy, first trimester  Comment:   Plan: Progesterone, US OB <14 Weeks w Transvaginal         Single, Prenatal Vit-Fe Fumarate-FA (PRENATAL         MULTIVITAMIN W/IRON) 27-0.8 MG tablet        LUCINDA 6/29/20-7/1/20    (R79.89) Low serum  progesterone  Comment:   Plan: Progesterone, progesterone (PROMETRIUM) 100 MG         capsule      Patient was agreeable to this plan and had no further questions.  There are no Patient Instructions on file for this visit.    No follow-ups on file.    Ludy Reza MD  [facility] [place]

## 2019-10-29 LAB — PROGEST SERPL-MCNC: 20.9 NG/ML

## 2019-11-27 ENCOUNTER — HOSPITAL ENCOUNTER (OUTPATIENT)
Dept: ULTRASOUND IMAGING | Facility: HOSPITAL | Age: 34
Discharge: HOME | End: 2019-11-27
Attending: FAMILY MEDICINE | Admitting: FAMILY MEDICINE
Payer: COMMERCIAL

## 2019-11-27 ENCOUNTER — TELEPHONE (OUTPATIENT)
Dept: FAMILY MEDICINE | Facility: OTHER | Age: 34
End: 2019-11-27

## 2019-11-27 DIAGNOSIS — Z34.81 ENCOUNTER FOR SUPERVISION OF OTHER NORMAL PREGNANCY, FIRST TRIMESTER: ICD-10-CM

## 2019-11-27 DIAGNOSIS — Z34.81 ENCOUNTER FOR SUPERVISION OF OTHER NORMAL PREGNANCY, FIRST TRIMESTER: Primary | ICD-10-CM

## 2019-11-27 PROCEDURE — 76801 OB US < 14 WKS SINGLE FETUS: CPT | Mod: TC

## 2019-11-29 DIAGNOSIS — Z34.81 ENCOUNTER FOR SUPERVISION OF OTHER NORMAL PREGNANCY, FIRST TRIMESTER: ICD-10-CM

## 2019-11-29 LAB — B-HCG SERPL-ACNC: ABNORMAL IU/L (ref 0–5)

## 2019-11-29 PROCEDURE — 84702 CHORIONIC GONADOTROPIN TEST: CPT | Performed by: FAMILY MEDICINE

## 2019-11-29 PROCEDURE — 36415 COLL VENOUS BLD VENIPUNCTURE: CPT | Performed by: FAMILY MEDICINE

## 2019-12-06 ENCOUNTER — HOSPITAL ENCOUNTER (OUTPATIENT)
Dept: ULTRASOUND IMAGING | Facility: HOSPITAL | Age: 34
Discharge: HOME | End: 2019-12-06
Attending: FAMILY MEDICINE | Admitting: FAMILY MEDICINE
Payer: COMMERCIAL

## 2019-12-06 DIAGNOSIS — Z34.81 ENCOUNTER FOR SUPERVISION OF OTHER NORMAL PREGNANCY, FIRST TRIMESTER: ICD-10-CM

## 2019-12-06 PROCEDURE — 76801 OB US < 14 WKS SINGLE FETUS: CPT | Mod: TC

## 2019-12-09 ENCOUNTER — TELEPHONE (OUTPATIENT)
Dept: FAMILY MEDICINE | Facility: OTHER | Age: 34
End: 2019-12-09

## 2019-12-09 DIAGNOSIS — Z34.81 ENCOUNTER FOR SUPERVISION OF OTHER NORMAL PREGNANCY, FIRST TRIMESTER: Primary | ICD-10-CM

## 2019-12-10 DIAGNOSIS — Z34.81 ENCOUNTER FOR SUPERVISION OF OTHER NORMAL PREGNANCY, FIRST TRIMESTER: ICD-10-CM

## 2019-12-10 LAB — B-HCG SERPL-ACNC: ABNORMAL IU/L (ref 0–5)

## 2019-12-10 PROCEDURE — 36415 COLL VENOUS BLD VENIPUNCTURE: CPT | Performed by: FAMILY MEDICINE

## 2019-12-10 PROCEDURE — 84702 CHORIONIC GONADOTROPIN TEST: CPT | Performed by: FAMILY MEDICINE

## 2019-12-11 ENCOUNTER — TELEPHONE (OUTPATIENT)
Dept: FAMILY MEDICINE | Facility: OTHER | Age: 34
End: 2019-12-11

## 2019-12-11 ENCOUNTER — MYC MEDICAL ADVICE (OUTPATIENT)
Dept: FAMILY MEDICINE | Facility: OTHER | Age: 34
End: 2019-12-11

## 2019-12-11 DIAGNOSIS — O03.9 SPONTANEOUS ABORTION: Primary | ICD-10-CM

## 2019-12-19 DIAGNOSIS — O03.9 SPONTANEOUS ABORTION: ICD-10-CM

## 2019-12-19 LAB — B-HCG SERPL-ACNC: 317 IU/L (ref 0–5)

## 2019-12-19 PROCEDURE — 36415 COLL VENOUS BLD VENIPUNCTURE: CPT | Performed by: FAMILY MEDICINE

## 2019-12-19 PROCEDURE — 84702 CHORIONIC GONADOTROPIN TEST: CPT | Performed by: FAMILY MEDICINE

## 2019-12-31 ENCOUNTER — TELEPHONE (OUTPATIENT)
Dept: FAMILY MEDICINE | Facility: OTHER | Age: 34
End: 2019-12-31

## 2019-12-31 DIAGNOSIS — O03.9 SPONTANEOUS ABORTION: ICD-10-CM

## 2019-12-31 LAB — B-HCG SERPL-ACNC: 12 IU/L (ref 0–5)

## 2019-12-31 PROCEDURE — 84702 CHORIONIC GONADOTROPIN TEST: CPT | Performed by: FAMILY MEDICINE

## 2019-12-31 PROCEDURE — 36415 COLL VENOUS BLD VENIPUNCTURE: CPT | Performed by: FAMILY MEDICINE

## 2020-01-20 DIAGNOSIS — O03.9 SPONTANEOUS ABORTION: ICD-10-CM

## 2020-01-20 LAB — B-HCG SERPL-ACNC: 1 IU/L (ref 0–5)

## 2020-01-20 PROCEDURE — 84702 CHORIONIC GONADOTROPIN TEST: CPT | Performed by: FAMILY MEDICINE

## 2020-01-20 PROCEDURE — 36415 COLL VENOUS BLD VENIPUNCTURE: CPT | Performed by: FAMILY MEDICINE

## 2020-03-10 ENCOUNTER — HEALTH MAINTENANCE LETTER (OUTPATIENT)
Age: 35
End: 2020-03-10

## 2020-03-20 ENCOUNTER — MYC MEDICAL ADVICE (OUTPATIENT)
Dept: FAMILY MEDICINE | Facility: OTHER | Age: 35
End: 2020-03-20

## 2020-08-17 ENCOUNTER — OFFICE VISIT (OUTPATIENT)
Dept: FAMILY MEDICINE | Facility: OTHER | Age: 35
End: 2020-08-17
Attending: FAMILY MEDICINE
Payer: COMMERCIAL

## 2020-08-17 ENCOUNTER — NURSE TRIAGE (OUTPATIENT)
Dept: FAMILY MEDICINE | Facility: OTHER | Age: 35
End: 2020-08-17

## 2020-08-17 DIAGNOSIS — Z20.822 EXPOSURE TO COVID-19 VIRUS: Primary | ICD-10-CM

## 2020-08-17 PROCEDURE — U0003 INFECTIOUS AGENT DETECTION BY NUCLEIC ACID (DNA OR RNA); SEVERE ACUTE RESPIRATORY SYNDROME CORONAVIRUS 2 (SARS-COV-2) (CORONAVIRUS DISEASE [COVID-19]), AMPLIFIED PROBE TECHNIQUE, MAKING USE OF HIGH THROUGHPUT TECHNOLOGIES AS DESCRIBED BY CMS-2020-01-R: HCPCS | Performed by: FAMILY MEDICINE

## 2020-08-17 NOTE — TELEPHONE ENCOUNTER
"Call from patient reporting exposure to a co-worker testing positive at [email or web address]. See protocol for details.     Staff wear masks during work hours.     Curbside Testing:    Next 5 appointments (look out 90 days)    Aug 17, 2020  4:15 PM CDT  (Arrive by 4:00 PM)  SHORT with [facility]OT [facility]  [facility] [place] ([facility] [place] [facility] [address]  [place] [address]  [phone]              Home Care Instructions provided:    Discharge Instructions for COVID-19 Patients  You have--or may have--COVID-19. Please follow the instructions listed below.   If you have a weakened immune system, discuss with your doctor any other actions you need to take.  How can I protect others?  If you have symptoms (fever, cough, body aches or trouble breathing):    Stay home and away from others (self-isolate) until:  ? At least 10 days have passed since your symptoms started. And   ? You've had no fever--and no medicine that reduces fever--for 3 full days (72 hours). And   ? Your other symptoms have resolved (gotten better).  If you don't show symptoms, but testing showed that you have COVID-19:    Stay home and away from others (self-isolate) until at least 10 days have passed since the date of your first positive COVID-19 test.  During this time    Stay in your own room, even for meals. Use your own bathroom if you can.    Stay away from others in your home. No hugging, kissing or shaking hands. No visitors.    Don't go to work, school or anywhere else.    Clean \"high touch\" surfaces often (doorknobs, counters, handles). Use household cleaning spray or wipes. You'll find a full list of  on the EPA website: www.epa.gov/pesticide-registration/list-n-disinfectants-use-against-sars-cov-2.    Cover your mouth and nose with a mask, tissue or wash cloth to avoid spreading germs.    Wash your hands and face often. Use soap and water.    Caregivers in these groups are at risk for severe " illness due to COVID-19:  ? People 65 years and older  ? People who live in a nursing home or long-term care facility  ? People with chronic disease (lung, heart, cancer, diabetes, kidney, liver, immunologic)  ? People who have a weakened immune system, including those who:    Are in cancer treatment    Take medicine that weakens the immune system, such as corticosteroids    Had a bone marrow or organ transplant    Have an immune deficiency    Have poorly controlled HIV or AIDS    Are obese (body mass index of 40 or higher)    Smoke regularly    Caregivers should wear gloves while washing dishes, handling laundry and cleaning bedrooms and bathrooms.    Use caution when washing and drying laundry: Don't shake dirty laundry and use the warmest water setting that you can.    For more tips on managing your health at home, go to www.cdc.gov/coronavirus/2019-ncov/downloads/10Things.pdf.  How can I take care of myself at home?  1. Get lots of rest. Drink extra fluids (unless a doctor has told you not to).  2. Take Tylenol (acetaminophen) for fever or pain. If you have liver or kidney problems, ask your family doctor if it's okay to take Tylenol.     Adults can take either:  ? 650 mg (two 325 mg pills) every 4 to 6 hours, or   ? 1,000 mg (two 500 mg pills) every 8 hours as needed.  ? Note: Don't take more than 3,000 mg in one day. Acetaminophen is found in many medicines (both prescribed and over-the-counter medicines). Read all labels to be sure you don't take too much.   For children, check the Tylenol bottle for the right dose. The dose is based on the child's age or weight.  3. If you have other health problems (like cancer, heart failure, an organ transplant or severe kidney disease): Call your specialty clinic if you don't feel better in the next 2 days.  4. Know when to call 911. Emergency warning signs include:  ? Trouble breathing or shortness of breath  ? Pain or pressure in the chest that doesn't go  away  ? Feeling confused like you haven't felt before, or not being able to wake up  ? Bluish-colored lips or face  5. Your doctor may have prescribed a blood thinner medicine. Follow their instructions.  Where can I get more information?    [facility] - About COVID-19:   [email or web address]    CDC - What to Do If You're Sick: www.cdc.gov/coronavirus/2019-ncov/about/steps-when-sick.html    CDC - Ending Home Isolation: www.cdc.gov/coronavirus/2019-ncov/hcp/disposition-in-home-patients.html    [facility] - Caring for Someone: www.cdc.gov/coronavirus/2019-ncov/if-you-are-sick/care-for-someone.html    [facility] - Interim Guidance for Hospital Discharge to Home: www.health.[facility].[place]/diseases/coronavirus/hcp/hospdischarge.pdf    [facility] clinical trials (COVID-19 research studies): clinicalaffairs.[facility]-clinical-trials    Below are the COVID-19 hotlines at the [place] Department of Health ([facility]). Interpreters are available.  ? For health questions: Call 906-136-0917 or 1-266.453.1595 (7 a.m. to 7 p.m.)  ? For questions about schools and childcare: Call 823-819-5178 or 1-526.261.5127 (7 a.m. to 7 p.m.)    For informational purposes only. Not to replace the advice of your health care provider. Clinically reviewed by the Infection Prevention Team.Copyright   2020 [facility]. All rights reserved. [employer] 511896 - 06/20.              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 recommended personal protective equipment (i.e., a respirator-N95 mask, eye protection, gloves, and gown) AND [3] NO symptoms    Negative:  "COVID-19 has been diagnosed by a healthcare provider (HCP)    Negative: COVID-19 lab test positive    Negative: [1] Symptoms of COVID-19 (e.g., cough, fever, SOB, or others) AND [2] lives in an area with community spread    Negative: [1] Symptoms of COVID-19 (e.g., cough, fever, SOB, or others) AND [2] within 14 days of EXPOSURE (close contact) with diagnosed or suspected COVID-19 patient    Negative: [1] Symptoms of COVID-19 (e.g., cough, fever, SOB, or others) AND [2] within 14 days of travel from high-risk area for COVID-19 community spread (identified by CDC)    Negative: [1] Difficulty breathing (shortness of breath) occurs AND [2] onset > 14 days after COVID-19 EXPOSURE (Close Contact) AND [3] no community spread where patient lives    Negative: [1] Dry cough occurs AND [2] onset > 14 days after COVID-19 EXPOSURE AND [3] no community spread where patient lives    Negative: [1] Wet cough (i.e., white-yellow, yellow, green, or vangie colored sputum) AND [2] onset > 14 days after COVID-19 EXPOSURE AND [3] no community spread where patient lives    Negative: [1] Common cold symptoms AND [2] onset > 14 days after COVID-19 EXPOSURE AND [3] no community spread where patient lives    Answer Assessment - Initial Assessment Questions  1. CLOSE CONTACT: \"Who is the person with the confirmed or suspected COVID-19 infection that you were exposed to?\"      Co-worker testing positive with COVID 19 on 8/16/20    2. PLACE of CONTACT: \"Where were you when you were exposed to COVID-19?\" (e.g., home, school, medical waiting room; which city?)      At work in [place] at the [employer]    3. TYPE of CONTACT: \"How much contact was there?\" (e.g., sitting next to, live in same house, work in same office, same building)      Work in the same office, building    4. DURATION of CONTACT: \"How long were you in contact with the COVID-19 patient?\" (e.g., a few seconds, passed by person, a few minutes, live with the patient)   " "   Works in the same office/building.     5. DATE of CONTACT: \"When did you have contact with a COVID-19 patient?\" (e.g., how many days ago)      8/13/20    6. TRAVEL: \"Have you traveled out of the country recently?\" If so, \"When and where?\"      * Also ask about out-of-state travel, since the [facility] has identified some high-risk cities for community spread in the .      * Note: Travel becomes less relevant if there is widespread community transmission where the patient lives.      No     7. COMMUNITY SPREAD: \"Are there lots of cases of COVID-19 (community spread) where you live?\" (See public health department website, if unsure)        Cases are on the rise.     8. SYMPTOMS: \"Do you have any symptoms?\" (e.g., fever, cough, breathing difficulty)      No    9. PREGNANCY OR POSTPARTUM: \"Is there any chance you are pregnant?\" \"When was your last menstrual period?\" \"Did you deliver in the last 2 weeks?\"      No     10. HIGH RISK: \"Do you have any heart or lung problems? Do you have a weak immune system?\" (e.g., CHF, COPD, asthma, HIV positive, chemotherapy, renal failure, diabetes mellitus, sickle cell anemia)        No    Protocols used: CORONAVIRUS (COVID-19) EXPOSURE-A- 5.16.20      "

## 2020-08-19 LAB
SARS-COV-2 RNA SPEC QL NAA+PROBE: NOT DETECTED
SPECIMEN SOURCE: NORMAL

## 2020-10-21 NOTE — PROGRESS NOTES
Clinic Visit  Date of visit: 10/21/2020   Chief Complaint:   Chief Complaint   Patient presents with     PCOS       HPI:   Viki Mota is a 35 year old  female who presents to clinic today for follow up  for PCOS .     Menarche: age 11  Menses: frequency: every 28-30 days and length: 6-7 days. Patient's last menstrual period was 10/08/2020.   Family Planning Chart: Yes: .  Acne: Yes: .  Hirsutism (facial hair): Yes: one spot .  Male-pattern hair loss: No.  Elevated serum androgen:   Lab Results   Component Value Date    DHEA 83 2017    TESTOSTTOTAL 34 2017    FT 0.21 2017    FELISA 0.912 2017   ]  BMI: Body mass index is 26 kg/m .   Type 2 DM: No.  Elevated Cholesterol: No.  Mood disorder: No.    Ultrasound: 2019 with miscarriage- and pelvic complete in 2017     Gynecologic History:  Last Pap:   Lab Results   Component Value Date    PAP NIL 2017      History of abnormal pap: Yes: awhile ago- states maybe mid .    Obstetric History:   OB History    Para Term  AB Living   3 3 1 0 0 0   SAB TAB Ectopic Multiple Live Births   0 0 0 0 0      # Outcome Date GA Lbr Chi/2nd Weight Sex Delivery Anes PTL Lv   3 Term 03/15/18 40w2d 21:50 / 00:27 3.805 kg (8 lb 6.2 oz) F Vag-Spont INT N       Name: JITENDRA MOTA      Apgar1: 7  Apgar5: 8   2 Para            1 Para               Obstetric Comments   Breast and bottle fed     Obstetric history significant for:  no complications    Medications:       acetylcysteine (N-ACETYL CYSTEINE) 600 MG CAPS capsule, Take 1 capsule (600 mg) by mouth 2 times daily       Multiple Vitamins-Minerals (OPTIVITE P.M.T.) TABS, Take 3 tablets by mouth 2 times daily       Omega-3 Fatty Acids (FISH OIL-OMEGA-3 FATTY ACID) 1000 MG DR capsule, Take 1 capsule (1 g) by mouth 2 times daily       Prenatal Vit-Fe Fumarate-FA (PRENATAL MULTIVITAMIN W/IRON) 27-0.8 MG tablet, Take 1 tablet by mouth daily       progesterone (PROMETRIUM) 100 MG  capsule, Place 1 capsule (100 mg) vaginally At Bedtime And take 1 capsule Po BID    No current facility-administered medications on file prior to visit.       Allergy:  No Known Allergies    Medical History:  Past Medical History:   Diagnosis Date     Abnormal cervical Papanicolaou smear 2010     PCOS (polycystic ovarian syndrome) 2017     Premenstrual syndrome 2017       Surgical History:  Past Surgical History:   Procedure Laterality Date     HC TOOTH EXTRACTION W/FORCEP Bilateral 06/01/2016       Social History:  Social History    Substance and Sexual Activity      Alcohol use: Yes        Comment: 2-3x/month    History   Smoking Status     Former Smoker     Packs/day: 0.20     Years: 6.00     Types: Cigarettes   Smokeless Tobacco     Never Used     Comment: quit 2010     History   Drug Use No     History   Sexual Activity     Sexual activity: Yes     Partners: Male     Birth control/ protection: Natural Family Planning     Comment: OCPs for 1 yr in , & again for 8 yrs     Relationship status is:  / Partnered    4 Years.  Name of Spouse / Partner:  Ge .    Lives in Stable housing and no concerns with Spouse and Children.   Employment: Employed full time  History of sexual, physical or mental abuse: No.   She denies current fears or concerns for personal safety.      Review of Systems:    GENERAL: No change in weight, sleep or appetite.  Normal energy.  No fever or chills  EYES: Negative for vision changes or eye problems  ENT: No problems with ears, nose or throat.  No difficulty swallowing.  RESP: No coughing, wheezing or shortness of breath  CV: No chest pains or palpitations  GI: No nausea, vomiting,  heartburn, abdominal pain, diarrhea, constipation or change in bowel habits  : No urinary frequency or dysuria, bladder or kidney problems  MUSCULOSKELETAL: No significant muscle or joint pains  NEUROLOGIC: No headaches, numbness, tingling, weakness, problems with balance or coordination  PSYCHIATRIC:  No problems with anxiety, depression or mental health  HEME/IMMUNE/ALLERGY: No history of bleeding or clotting problems or anemia.  No allergies or immune system problems  ENDOCRINE: No history of thyroid disease, diabetes or other endocrine disorders  SKIN: No rashes,worrisome lesions or skin problems      Physical Exam:  Vitals:    20 1501   BP: 100/60   BP Location: Left arm   Patient Position: Chair   Cuff Size: Adult Regular   Pulse: 70   Resp: 20   Temp: 98  F (36.7  C)   TempSrc: Tympanic   SpO2: 99%   Weight: 77.6 kg (171 lb)     Body mass index is 26 kg/m .    Gen: NAD, Awake and alert, cooperative with exam  CV: regular rate and rhythm  Resp: lungs clear to auscultation bilaterally  Abd: soft, nontender, nondistended, no rebound, no guarding  Extremities: nontender, no edema  Psych:  Normal mentation and AO x 3    Assessment/Plan:  Viki Busch is a 35 year old  who presents for follow up  for PCOS .   (N64.3) Galactorrhea  (primary encounter diagnosis)  Comment:   Plan: Prolactin        Will check next week at beginning of her cycle    (E28.2) PCOS (polycystic ovarian syndrome)  Comment:   Plan: Estradiol, Progesterone        Labs today and then next few cycles  Follow-up 2 mos  Start back on optivite pmt    Ludy Reza MD

## 2020-11-02 ENCOUNTER — OFFICE VISIT (OUTPATIENT)
Dept: FAMILY MEDICINE | Facility: OTHER | Age: 35
End: 2020-11-02
Attending: FAMILY MEDICINE
Payer: COMMERCIAL

## 2020-11-02 VITALS
BODY MASS INDEX: 26 KG/M2 | OXYGEN SATURATION: 99 % | TEMPERATURE: 98 F | HEART RATE: 70 BPM | SYSTOLIC BLOOD PRESSURE: 100 MMHG | DIASTOLIC BLOOD PRESSURE: 60 MMHG | WEIGHT: 171 LBS | RESPIRATION RATE: 20 BRPM

## 2020-11-02 DIAGNOSIS — E28.2 PCOS (POLYCYSTIC OVARIAN SYNDROME): ICD-10-CM

## 2020-11-02 DIAGNOSIS — N64.3 GALACTORRHEA: Primary | ICD-10-CM

## 2020-11-02 DIAGNOSIS — R79.89 LOW SERUM PROGESTERONE: ICD-10-CM

## 2020-11-02 PROCEDURE — 99214 OFFICE O/P EST MOD 30 MIN: CPT | Performed by: FAMILY MEDICINE

## 2020-11-02 PROCEDURE — 84144 ASSAY OF PROGESTERONE: CPT | Performed by: FAMILY MEDICINE

## 2020-11-02 PROCEDURE — 36415 COLL VENOUS BLD VENIPUNCTURE: CPT | Performed by: FAMILY MEDICINE

## 2020-11-02 PROCEDURE — 82670 ASSAY OF TOTAL ESTRADIOL: CPT | Performed by: FAMILY MEDICINE

## 2020-11-02 ASSESSMENT — ANXIETY QUESTIONNAIRES
2. NOT BEING ABLE TO STOP OR CONTROL WORRYING: NOT AT ALL
5. BEING SO RESTLESS THAT IT IS HARD TO SIT STILL: NOT AT ALL
3. WORRYING TOO MUCH ABOUT DIFFERENT THINGS: NOT AT ALL
6. BECOMING EASILY ANNOYED OR IRRITABLE: NOT AT ALL
7. FEELING AFRAID AS IF SOMETHING AWFUL MIGHT HAPPEN: NOT AT ALL
1. FEELING NERVOUS, ANXIOUS, OR ON EDGE: NOT AT ALL
GAD7 TOTAL SCORE: 0

## 2020-11-02 ASSESSMENT — PAIN SCALES - GENERAL: PAINLEVEL: NO PAIN (0)

## 2020-11-02 ASSESSMENT — PATIENT HEALTH QUESTIONNAIRE - PHQ9
5. POOR APPETITE OR OVEREATING: NOT AT ALL
SUM OF ALL RESPONSES TO PHQ QUESTIONS 1-9: 0

## 2020-11-02 NOTE — NURSING NOTE
"Chief Complaint   Patient presents with     PCOS       Initial /60 (BP Location: Left arm, Patient Position: Chair, Cuff Size: Adult Regular)   Pulse 70   Temp 98  F (36.7  C) (Tympanic)   Resp 20   Wt 77.6 kg (171 lb)   LMP 10/08/2020   SpO2 99%   BMI 26.00 kg/m   Estimated body mass index is 26 kg/m  as calculated from the following:    Height as of 8/5/19: 1.727 m (5' 8\").    Weight as of this encounter: 77.6 kg (171 lb).  Medication Reconciliation: complete  Terri Guerrero LPN    "

## 2020-11-03 LAB — ESTRADIOL SERPL-MCNC: 70 PG/ML

## 2020-11-03 ASSESSMENT — ANXIETY QUESTIONNAIRES: GAD7 TOTAL SCORE: 0

## 2020-11-04 LAB — PROGEST SERPL-MCNC: 4.5 NG/ML

## 2020-11-09 DIAGNOSIS — N64.3 GALACTORRHEA: ICD-10-CM

## 2020-11-09 LAB — PROLACTIN SERPL-MCNC: 11 UG/L (ref 3–27)

## 2020-11-09 PROCEDURE — 36415 COLL VENOUS BLD VENIPUNCTURE: CPT | Performed by: FAMILY MEDICINE

## 2020-11-09 PROCEDURE — 84146 ASSAY OF PROLACTIN: CPT | Performed by: FAMILY MEDICINE

## 2020-11-27 DIAGNOSIS — R79.89 LOW SERUM PROGESTERONE: ICD-10-CM

## 2020-11-27 DIAGNOSIS — O03.9 SPONTANEOUS ABORTION: ICD-10-CM

## 2020-11-27 DIAGNOSIS — E28.2 PCOS (POLYCYSTIC OVARIAN SYNDROME): ICD-10-CM

## 2020-11-27 LAB
ESTRADIOL SERPL-MCNC: 127 PG/ML
PROGEST SERPL-MCNC: 38.1 NG/ML

## 2020-11-27 PROCEDURE — 84144 ASSAY OF PROGESTERONE: CPT | Performed by: FAMILY MEDICINE

## 2020-11-27 PROCEDURE — 82670 ASSAY OF TOTAL ESTRADIOL: CPT | Performed by: FAMILY MEDICINE

## 2020-11-27 PROCEDURE — 36415 COLL VENOUS BLD VENIPUNCTURE: CPT | Performed by: FAMILY MEDICINE

## 2020-12-03 ENCOUNTER — MYC MEDICAL ADVICE (OUTPATIENT)
Dept: FAMILY MEDICINE | Facility: OTHER | Age: 35
End: 2020-12-03

## 2020-12-24 DIAGNOSIS — R79.89 LOW SERUM PROGESTERONE: ICD-10-CM

## 2020-12-24 DIAGNOSIS — E28.2 PCOS (POLYCYSTIC OVARIAN SYNDROME): ICD-10-CM

## 2020-12-24 LAB
ESTRADIOL SERPL-MCNC: 136 PG/ML
PROGEST SERPL-MCNC: 13.1 NG/ML

## 2020-12-24 PROCEDURE — 36415 COLL VENOUS BLD VENIPUNCTURE: CPT | Performed by: FAMILY MEDICINE

## 2020-12-24 PROCEDURE — 82670 ASSAY OF TOTAL ESTRADIOL: CPT | Performed by: FAMILY MEDICINE

## 2020-12-24 PROCEDURE — 84144 ASSAY OF PROGESTERONE: CPT | Performed by: FAMILY MEDICINE

## 2020-12-27 ENCOUNTER — HEALTH MAINTENANCE LETTER (OUTPATIENT)
Age: 35
End: 2020-12-27

## 2020-12-30 NOTE — PROGRESS NOTES
"Subjective     Viki Busch is a 35 year old female who presents to clinic today for the following health issues:    HPI         Concern - PCOS  Onset: 3 years  Description: PCOS  Intensity: moderate  Progression of Symptoms:  same  Accompanying Signs & Symptoms: NA  Previous history of similar problem: Chronic  Precipitating factors:        Worsened by: NA  Alleviating factors:        Improved by: NA  Therapies tried and outcome: None    Menstrual Concern  Onset/Duration: years  Description:   Duration of bleeding episodes: 5-9 days  Frequency of periods: (1st day of one to 1st day of next):  every 23-28 days  Describe bleeding/flow:   Clots: no  Number of pads/day: 3        Cramping: mild  Accompanying Signs & Symptoms:  Lightheadedness: no  Temperature intolerance: no  Nosebleeds/Easy bruising: no  Vaginal Discharge: no  Acne: no  Change in body hair: no  History:  No LMP recorded.  Previous normal periods: YES  Contraceptive use: YES  Sexually active: YES  Any bleeding after intercourse: YES  Abnormal PAP Smears: no  Precipitating or alleviating factors: None  Therapies tried and outcome: None        Review of Systems   Constitutional, HEENT, cardiovascular, pulmonary, gi and gu systems are negative, except as otherwise noted.      Objective    /64 (BP Location: Left arm, Patient Position: Sitting, Cuff Size: Adult Regular)   Pulse 67   Temp 97.8  F (36.6  C) (Tympanic)   Ht 1.727 m (5' 8\")   Wt 77.1 kg (170 lb)   SpO2 99%   BMI 25.85 kg/m    There is no height or weight on file to calculate BMI.  Physical Exam   GENERAL: healthy, alert and no distress  NECK: no adenopathy, no asymmetry, masses, or scars and thyroid normal to palpation  RESP: lungs clear to auscultation - no rales, rhonchi or wheezes  CV: regular rate and rhythm, normal S1 S2, no S3 or S4, no murmur, click or rub, no peripheral edema and peripheral pulses strong   (female): normal female external genitalia, normal urethral " "meatus, vaginal mucosa, normal cervix/adnexa/uterus without masses or discharge   (female): cervix reveals mild ectropion treated with silver nitrate without difficulties  MS: no gross musculoskeletal defects noted, no edema  PSYCH: mentation appears normal, affect normal/bright    No results found for this or any previous visit (from the past 24 hour(s)).        Assessment & Plan     PCOS (polycystic ovarian syndrome)  Getting varied results of progesterone every other month  Do peak +7 labs this cycle and will likely add on progesterone monthly  Start b6 SR 500mg daily to increase mucus  Follow-up 2-3 mos    Procreative counseling and advice using natural family planning  See above  Follow-up 2-3 mos    Low serum progesterone    Ectropion of cervix  Silver nitrate used to treat ectropion and hopefully this will eliminate the spotting issue otherwise may need pelvic ultrasound       BMI:   Estimated body mass index is 25.85 kg/m  as calculated from the following:    Height as of this encounter: 1.727 m (5' 8\").    Weight as of this encounter: 77.1 kg (170 lb).          Patient was agreeable to this plan and had no further questions.  Patient Instructions   1.  Vitamin B6 sustained release (SR)  500mg -- take daily  2.  Peak + 7 labs this month  3.  Will start progesterone NEXT cycle/next month  4.  ashwaghanda 2x/day      No follow-ups on file.    Ludy Reza MD  [facility] [place]      Ectropion cervix -- silver nitrate  -Skin exam done as above  -Patient educated on the rationale for treating lesion(s) as well as skin changes that indicate need for follow up.  -Patient elects to proceed with silver nitrate to lesion(s) after discussion of risks/benefits of silver nitrate  -silver nitrate treatment to the above lesion(s), patient educated in the care of these areas.   -Advised pt to return for exam if lesions are not resolved in 6 weeks.   "

## 2021-01-04 ENCOUNTER — OFFICE VISIT (OUTPATIENT)
Dept: FAMILY MEDICINE | Facility: OTHER | Age: 36
End: 2021-01-04
Attending: FAMILY MEDICINE
Payer: COMMERCIAL

## 2021-01-04 VITALS
TEMPERATURE: 97.8 F | WEIGHT: 170 LBS | HEART RATE: 67 BPM | HEIGHT: 68 IN | DIASTOLIC BLOOD PRESSURE: 64 MMHG | SYSTOLIC BLOOD PRESSURE: 102 MMHG | OXYGEN SATURATION: 99 % | BODY MASS INDEX: 25.76 KG/M2

## 2021-01-04 DIAGNOSIS — R79.89 LOW SERUM PROGESTERONE: ICD-10-CM

## 2021-01-04 DIAGNOSIS — E28.2 PCOS (POLYCYSTIC OVARIAN SYNDROME): Primary | ICD-10-CM

## 2021-01-04 DIAGNOSIS — Z31.61 PROCREATIVE COUNSELING AND ADVICE USING NATURAL FAMILY PLANNING: ICD-10-CM

## 2021-01-04 DIAGNOSIS — N86 ECTROPION OF CERVIX: ICD-10-CM

## 2021-01-04 PROCEDURE — 57511 CRYOCAUTERY OF CERVIX: CPT | Performed by: FAMILY MEDICINE

## 2021-01-04 PROCEDURE — 99214 OFFICE O/P EST MOD 30 MIN: CPT | Mod: 25 | Performed by: FAMILY MEDICINE

## 2021-01-04 ASSESSMENT — MIFFLIN-ST. JEOR: SCORE: 1509.61

## 2021-01-04 NOTE — NURSING NOTE
"Chief Complaint   Patient presents with     Abnormal Bleeding Problem     PCOS       Initial /64 (BP Location: Left arm, Patient Position: Sitting, Cuff Size: Adult Regular)   Pulse 67   Temp 97.8  F (36.6  C) (Tympanic)   Ht 1.727 m (5' 8\")   Wt 77.1 kg (170 lb)   SpO2 99%   BMI 25.85 kg/m   Estimated body mass index is 25.85 kg/m  as calculated from the following:    Height as of this encounter: 1.727 m (5' 8\").    Weight as of this encounter: 77.1 kg (170 lb).  Medication Reconciliation: complete  Ashley Finney LPN  "

## 2021-01-04 NOTE — PATIENT INSTRUCTIONS
1.  Vitamin B6 sustained release (SR)  500mg -- take daily  2.  Peak + 7 labs this month  3.  Will start progesterone NEXT cycle/next month  4.  ashwaghanda 2x/day

## 2021-01-20 DIAGNOSIS — R79.89 LOW SERUM PROGESTERONE: ICD-10-CM

## 2021-01-20 DIAGNOSIS — E28.2 PCOS (POLYCYSTIC OVARIAN SYNDROME): ICD-10-CM

## 2021-01-20 LAB
ESTRADIOL SERPL-MCNC: 114 PG/ML
PROGEST SERPL-MCNC: 8.2 NG/ML

## 2021-01-20 PROCEDURE — 36415 COLL VENOUS BLD VENIPUNCTURE: CPT | Performed by: FAMILY MEDICINE

## 2021-01-20 PROCEDURE — 82670 ASSAY OF TOTAL ESTRADIOL: CPT | Performed by: FAMILY MEDICINE

## 2021-01-20 PROCEDURE — 84144 ASSAY OF PROGESTERONE: CPT | Performed by: FAMILY MEDICINE

## 2021-02-15 DIAGNOSIS — E28.2 PCOS (POLYCYSTIC OVARIAN SYNDROME): ICD-10-CM

## 2021-02-15 DIAGNOSIS — R79.89 LOW SERUM PROGESTERONE: ICD-10-CM

## 2021-02-15 LAB
ESTRADIOL SERPL-MCNC: 145 PG/ML
PROGEST SERPL-MCNC: 17.9 NG/ML

## 2021-02-15 PROCEDURE — 36415 COLL VENOUS BLD VENIPUNCTURE: CPT | Performed by: FAMILY MEDICINE

## 2021-02-15 PROCEDURE — 82670 ASSAY OF TOTAL ESTRADIOL: CPT | Performed by: FAMILY MEDICINE

## 2021-02-15 PROCEDURE — 84144 ASSAY OF PROGESTERONE: CPT | Performed by: FAMILY MEDICINE

## 2021-03-17 NOTE — PROGRESS NOTES
Assessment & Plan     PCOS (polycystic ovarian syndrome)  Increase progesterone  Follow-up 2 mos  Spotting has resolved which is good  In 2 mos consider LDN 4.5 mg  Discussed covid vaccine  They are planning to continue trying for the next year  - Estradiol; Standing  - Progesterone; Standing  - progesterone (PROMETRIUM) 200 MG capsule; Take 1 capsule (200 mg) by mouth At Bedtime    Premenstrual syndrome  As above  - Estradiol; Standing  - Progesterone; Standing  - progesterone (PROMETRIUM) 200 MG capsule; Take 1 capsule (200 mg) by mouth At Bedtime    Procreative counseling and advice using natural family planning    - Estradiol; Standing  - Progesterone; Standing  - progesterone (PROMETRIUM) 200 MG capsule; Take 1 capsule (200 mg) by mouth At Bedtime          Patient was agreeable to this plan and had no further questions.  There are no Patient Instructions on file for this visit.    No follow-ups on file.    Ludy Reza MD  [facility] - [facility]   Viki is a 35 year old who presents for the following health issues   HPI   Viki presents to the clinic for follow up with charting and was having some spotting prior to last visit. Since she has not had any spotting. She brings with her, her charting. She would like to review her progesterone levels and know when to complete more labs.       Clinic Visit  Date of visit: 3/17/2021   Chief Complaint: No chief complaint on file.      HPI:   Viki Busch is a 35 year old  female who presents to clinic today for follow up  for PCOS .     Menarche: age 11  Menses: frequency: every 28-30 days and length: 6-7 days. Patient's last menstrual period was 10/08/2020.   Family Planning Chart: Yes: .  Acne: Yes: .  Hirsutism (facial hair): Yes: one spot .  Male-pattern hair loss: No.  Elevated serum androgen:         Lab Results   Component Value Date     DHEA 83 2017     TESTOSTTOTAL 34 2017     FT 0.21 2017      FELISA 0.912 2017   ]  BMI: Body mass index is 26 kg/m .   Type 2 DM: No.  Elevated Cholesterol: No.  Mood disorder: No.     Ultrasound: 2019 with miscarriage- and pelvic complete in 2017      Gynecologic History:  Last Pap:         Lab Results   Component Value Date     PAP NIL 2017      History of abnormal pap: Yes: awhile ago- states maybe mid .     Obstetric History:                    OB History    Para Term  AB Living   3 3 1 0 0 0   SAB TAB Ectopic Multiple Live Births      0 0 0 0 0          # Outcome Date GA Lbr Chi/2nd Weight Sex Delivery Anes PTL Lv   3 Term 03/15/18 40w2d 21:50 / 00:27 3.805 kg (8 lb 6.2 oz) F Vag-Spont INT N        Name: JITENDRA MOTA      Apgar1: 7  Apgar5: 8   2 Para                     1 Para                         Obstetric Comments   Breast and bottle fed      Obstetric history significant for:  no complications     Medications:        acetylcysteine (N-ACETYL CYSTEINE) 600 MG CAPS capsule, Take 1 capsule (600 mg) by mouth 2 times daily        Multiple Vitamins-Minerals (OPTIVITE P.M.T.) TABS, Take 3 tablets by mouth 2 times daily        Omega-3 Fatty Acids (FISH OIL-OMEGA-3 FATTY ACID) 1000 MG DR capsule, Take 1 capsule (1 g) by mouth 2 times daily        Prenatal Vit-Fe Fumarate-FA (PRENATAL MULTIVITAMIN W/IRON) 27-0.8 MG tablet, Take 1 tablet by mouth daily        progesterone (PROMETRIUM) 100 MG capsule, Place 1 capsule (100 mg) vaginally At Bedtime And take 1 capsule Po BID     No current facility-administered medications on file prior to visit.         Allergy:  No Known Allergies     Medical History:  Past Medical History        Past Medical History:   Diagnosis Date     Abnormal cervical Papanicolaou smear      PCOS (polycystic ovarian syndrome) 2017     Premenstrual syndrome 2017            Surgical History:  Past Surgical History         Past Surgical History:   Procedure Laterality Date     HC TOOTH EXTRACTION W/FORCEP Bilateral  06/01/2016            Social History:  Social History    Substance and Sexual Activity      Alcohol use: Yes        Comment: 2-3x/month           History   Smoking Status     Former Smoker     Packs/day: 0.20     Years: 6.00     Types: Cigarettes   Smokeless Tobacco     Never Used       Comment: quit 2010          History   Drug Use No            History   Sexual Activity     Sexual activity: Yes     Partners: Male     Birth control/ protection: Natural Family Planning       Comment: OCPs for 1 yr in , & again for 8 yrs      Relationship status is:  / Partnered    4 Years.  Name of Spouse / Partner:  Ge .    Lives in Stable housing and no concerns with Spouse and Children.   Employment: Employed full time  History of sexual, physical or mental abuse: No.   She denies current fears or concerns for personal safety.       Review of Systems:    GENERAL: No change in weight, sleep or appetite.  Normal energy.  No fever or chills  EYES: Negative for vision changes or eye problems  ENT: No problems with ears, nose or throat.  No difficulty swallowing.  RESP: No coughing, wheezing or shortness of breath  CV: No chest pains or palpitations  GI: No nausea, vomiting,  heartburn, abdominal pain, diarrhea, constipation or change in bowel habits  : No urinary frequency or dysuria, bladder or kidney problems  MUSCULOSKELETAL: No significant muscle or joint pains  NEUROLOGIC: No headaches, numbness, tingling, weakness, problems with balance or coordination  PSYCHIATRIC: No problems with anxiety, depression or mental health  HEME/IMMUNE/ALLERGY: No history of bleeding or clotting problems or anemia.  No allergies or immune system problems  ENDOCRINE: No history of thyroid disease, diabetes or other endocrine disorders  SKIN: No rashes,worrisome lesions or skin problems      Physical Exam:  Vitals:    03/18/21 1022   BP: 122/78   BP Location: Left arm   Patient Position: Chair   Cuff Size: Adult Regular   Pulse: 77  "  Temp: 97.1  F (36.2  C)   TempSrc: Tympanic   SpO2: 98%   Weight: 73.8 kg (162 lb 12.8 oz)   Height: 1.727 m (5' 8\")     There is no height or weight on file to calculate BMI.    Gen: NAD, Awake and alert, cooperative with exam  CV: regular rate and rhythm  Resp: lungs clear to auscultation bilaterally  Abd: soft, nontender, nondistended, no rebound, no guarding  Extremities: nontender, no edema  Psych:  Normal mood and mentation    Assessment/Plan:  Viki Busch is a 35 year old  who presents for follow up  for PCOS .     Ludy Reza MD       Orders Only on 02/15/2021   Component Date Value Ref Range Status     Progesterone 02/15/2021 17.9  ng/mL Final    Comment: Progesterone Reference Range  Female  Nonpregnant          Follicular      0.15-1.4          Luteal          3.4-25.6          Postmenopausal  <0.15-0.73  Pregnant          1st Trimester   11.2-90.0          2nd Trimester   25.6-89.4          3rd Trimester   48.4-422.5       Estradiol 02/15/2021 145  pg/mL Final    Comment: Estradiol reference ranges for pre-menopausal  Follicular     pg/mL  Mid-cycle    pg/mL  Luteal          pg/mL                 "

## 2021-03-18 ENCOUNTER — OFFICE VISIT (OUTPATIENT)
Dept: FAMILY MEDICINE | Facility: OTHER | Age: 36
End: 2021-03-18
Attending: FAMILY MEDICINE
Payer: COMMERCIAL

## 2021-03-18 VITALS
DIASTOLIC BLOOD PRESSURE: 78 MMHG | SYSTOLIC BLOOD PRESSURE: 122 MMHG | BODY MASS INDEX: 24.67 KG/M2 | OXYGEN SATURATION: 98 % | HEART RATE: 77 BPM | WEIGHT: 162.8 LBS | TEMPERATURE: 97.1 F | HEIGHT: 68 IN

## 2021-03-18 DIAGNOSIS — Z31.61 PROCREATIVE COUNSELING AND ADVICE USING NATURAL FAMILY PLANNING: ICD-10-CM

## 2021-03-18 DIAGNOSIS — N94.3 PREMENSTRUAL SYNDROME: ICD-10-CM

## 2021-03-18 DIAGNOSIS — E28.2 PCOS (POLYCYSTIC OVARIAN SYNDROME): Primary | ICD-10-CM

## 2021-03-18 PROCEDURE — 99214 OFFICE O/P EST MOD 30 MIN: CPT | Performed by: FAMILY MEDICINE

## 2021-03-18 ASSESSMENT — PAIN SCALES - GENERAL: PAINLEVEL: NO PAIN (0)

## 2021-03-18 ASSESSMENT — MIFFLIN-ST. JEOR: SCORE: 1476.96

## 2021-03-18 NOTE — NURSING NOTE
"Chief Complaint   Patient presents with     family planning     follow up , planning pregnancy        Initial /78 (BP Location: Left arm, Patient Position: Chair, Cuff Size: Adult Regular)   Pulse 77   Temp 97.1  F (36.2  C) (Tympanic)   Ht 1.727 m (5' 8\")   Wt 73.8 kg (162 lb 12.8 oz)   LMP 02/20/2021   SpO2 98%   BMI 24.75 kg/m   Estimated body mass index is 24.75 kg/m  as calculated from the following:    Height as of this encounter: 1.727 m (5' 8\").    Weight as of this encounter: 73.8 kg (162 lb 12.8 oz).  Medication Reconciliation: complete  Rubi Sharma LPN  "

## 2021-04-08 DIAGNOSIS — Z31.61 PROCREATIVE COUNSELING AND ADVICE USING NATURAL FAMILY PLANNING: ICD-10-CM

## 2021-04-08 DIAGNOSIS — N94.3 PREMENSTRUAL SYNDROME: ICD-10-CM

## 2021-04-08 DIAGNOSIS — E28.2 PCOS (POLYCYSTIC OVARIAN SYNDROME): ICD-10-CM

## 2021-04-08 LAB
ESTRADIOL SERPL-MCNC: 112 PG/ML
PROGEST SERPL-MCNC: 26.1 NG/ML

## 2021-04-08 PROCEDURE — 84144 ASSAY OF PROGESTERONE: CPT | Performed by: FAMILY MEDICINE

## 2021-04-08 PROCEDURE — 36415 COLL VENOUS BLD VENIPUNCTURE: CPT | Performed by: FAMILY MEDICINE

## 2021-04-08 PROCEDURE — 82670 ASSAY OF TOTAL ESTRADIOL: CPT | Performed by: FAMILY MEDICINE

## 2021-04-24 ENCOUNTER — HEALTH MAINTENANCE LETTER (OUTPATIENT)
Age: 36
End: 2021-04-24

## 2021-05-05 DIAGNOSIS — E28.2 PCOS (POLYCYSTIC OVARIAN SYNDROME): ICD-10-CM

## 2021-05-05 DIAGNOSIS — Z31.61 PROCREATIVE COUNSELING AND ADVICE USING NATURAL FAMILY PLANNING: ICD-10-CM

## 2021-05-05 DIAGNOSIS — N94.3 PREMENSTRUAL SYNDROME: ICD-10-CM

## 2021-05-05 LAB
ESTRADIOL SERPL-MCNC: 114 PG/ML
PROGEST SERPL-MCNC: 20.7 NG/ML

## 2021-05-05 PROCEDURE — 84144 ASSAY OF PROGESTERONE: CPT | Performed by: FAMILY MEDICINE

## 2021-05-05 PROCEDURE — 36415 COLL VENOUS BLD VENIPUNCTURE: CPT | Performed by: FAMILY MEDICINE

## 2021-05-05 PROCEDURE — 82670 ASSAY OF TOTAL ESTRADIOL: CPT | Performed by: FAMILY MEDICINE

## 2021-05-10 NOTE — PROGRESS NOTES
Clinic Visit  Date of visit: 5/10/2021   Chief Complaint: No chief complaint on file.      HPI:   Viki Mota is a 35 year old  female who presents to clinic today for follow up  for PCOS .     Menarche: age 11  Menses: frequency: every 28-30 days and length: 6-7 days. No LMP recorded.   Family Planning Chart: Yes: .  Acne: Yes: .  Hirsutism (facial hair): Yes: one spot.  Male-pattern hair loss: No.  Elevated serum androgen:   Lab Results   Component Value Date    DHEA 83 2017    TESTOSTTOTAL 34 2017    FT 0.21 2017    FELISA 0.912 2017   ]  BMI: There is no height or weight on file to calculate BMI.   Type 2 DM: No.  Elevated Cholesterol: No.  Mood disorder: No.    Ultrasound: 2019 with miscarriage- and pelvic complete in 2017     Gynecologic History:  Last Pap:   Lab Results   Component Value Date    PAP NIL 2017      History of abnormal pap: Yes: awhile ago- states maybe mid  .    Obstetric History:   OB History    Para Term  AB Living   3 3 1 0 0 0   SAB TAB Ectopic Multiple Live Births   0 0 0 0 0      # Outcome Date GA Lbr Chi/2nd Weight Sex Delivery Anes PTL Lv   3 Term 03/15/18 40w2d 21:50 / 00:27 3.805 kg (8 lb 6.2 oz) F Vag-Spont INT N       Name: JITENDRA MOTA      Apgar1: 7  Apgar5: 8   2 Para            1 Para               Obstetric Comments   Breast and bottle fed     Obstetric history significant for:  n/a    Medications:  acetylcysteine (N-ACETYL CYSTEINE) 600 MG CAPS capsule, Take 1 capsule (600 mg) by mouth 2 times daily  Multiple Vitamins-Minerals (OPTIVITE P.M.T.) TABS, Take 3 tablets by mouth 2 times daily  Omega-3 Fatty Acids (FISH OIL-OMEGA-3 FATTY ACID) 1000 MG DR capsule, Take 1 capsule (1 g) by mouth 2 times daily  progesterone (PROMETRIUM) 100 MG capsule, Take 1 capsule (100 mg) by mouth At Bedtime For 10 nights out of the month starting on peak +3  progesterone (PROMETRIUM) 200 MG capsule, Take 1 capsule (200 mg) by  mouth At Bedtime    No current facility-administered medications on file prior to visit.       Allergy:  No Known Allergies    Medical History:  Past Medical History:   Diagnosis Date     Abnormal cervical Papanicolaou smear 2010     PCOS (polycystic ovarian syndrome) 2017     Premenstrual syndrome 2017       Surgical History:  Past Surgical History:   Procedure Laterality Date     HC TOOTH EXTRACTION W/FORCEP Bilateral 06/01/2016       Social History:  Social History    Substance and Sexual Activity      Alcohol use: Yes        Comment: 2-3x/month    History   Smoking Status     Former Smoker     Packs/day: 0.20     Years: 6.00     Types: Cigarettes   Smokeless Tobacco     Never Used     Comment: quit 2010     History   Drug Use No     History   Sexual Activity     Sexual activity: Yes     Partners: Male     Birth control/ protection: Natural Family Planning     Comment: OCPs for 1 yr in , & again for 8 yrs     Relationship status is:  / Partnered    4 Years.  Name of Spouse / Partner:  Ge.    Lives in Stable housing and no concerns with Spouse and Children.   Employment: Employed full time  History of sexual, physical or mental abuse: No.   She denies current fears or concerns for personal safety.      Review of Systems:    GENERAL: No change in weight, sleep or appetite.  Normal energy.  No fever or chills  EYES: Negative for vision changes or eye problems  ENT: No problems with ears, nose or throat.  No difficulty swallowing.  RESP: No coughing, wheezing or shortness of breath  CV: No chest pains or palpitations  GI: No nausea, vomiting,  heartburn, abdominal pain, diarrhea, constipation or change in bowel habits  : No urinary frequency or dysuria, bladder or kidney problems  MUSCULOSKELETAL: No significant muscle or joint pains  NEUROLOGIC: No headaches, numbness, tingling, weakness, problems with balance or coordination  PSYCHIATRIC: No problems with anxiety, depression or mental  health  HEME/IMMUNE/ALLERGY: No history of bleeding or clotting problems or anemia.  No allergies or immune system problems  ENDOCRINE: No history of thyroid disease, diabetes or other endocrine disorders  SKIN: No rashes,worrisome lesions or skin problems      Physical Exam:  There were no vitals filed for this visit.  There is no height or weight on file to calculate BMI.    Gen: NAD, Awake and alert, cooperative with exam  CV: regular rate and rhythm  Resp: lungs clear to auscultation bilaterally  Abd: soft, nontender, nondistended, no rebound, no guarding  Extremities: nontender, no edema    Assessment/Plan:  Viki Busch is a 35 year old  who presents for follow up  for PCOS .   (E28.2) PCOS (polycystic ovarian syndrome)  (primary encounter diagnosis)  Comment:   Plan: Naltrexone POWD, Naltrexone POWD        Follow-up 2 mos  Expect to see brown bleeding resolve    (N94.3) Premenstrual syndrome  Comment:   Plan: Naltrexone POWD, Naltrexone POWD        Follow-up 2 mos    (R79.89) Low serum progesterone  Comment:   Plan: doing well with prometrium    (Z31.61) Procreative counseling and advice using natural family planning  Comment:   Plan: charting looks good, continue          Ludy Reza MD

## 2021-05-18 ENCOUNTER — TELEPHONE (OUTPATIENT)
Dept: FAMILY MEDICINE | Facility: OTHER | Age: 36
End: 2021-05-18

## 2021-05-18 ENCOUNTER — OFFICE VISIT (OUTPATIENT)
Dept: FAMILY MEDICINE | Facility: OTHER | Age: 36
End: 2021-05-18
Attending: FAMILY MEDICINE
Payer: COMMERCIAL

## 2021-05-18 VITALS
SYSTOLIC BLOOD PRESSURE: 110 MMHG | RESPIRATION RATE: 18 BRPM | TEMPERATURE: 96.8 F | HEART RATE: 70 BPM | DIASTOLIC BLOOD PRESSURE: 70 MMHG | HEIGHT: 68 IN | OXYGEN SATURATION: 99 % | WEIGHT: 156.8 LBS | BODY MASS INDEX: 23.76 KG/M2

## 2021-05-18 DIAGNOSIS — E28.2 PCOS (POLYCYSTIC OVARIAN SYNDROME): Primary | ICD-10-CM

## 2021-05-18 DIAGNOSIS — R79.89 LOW SERUM PROGESTERONE: ICD-10-CM

## 2021-05-18 DIAGNOSIS — N94.3 PREMENSTRUAL SYNDROME: ICD-10-CM

## 2021-05-18 DIAGNOSIS — Z31.61 PROCREATIVE COUNSELING AND ADVICE USING NATURAL FAMILY PLANNING: ICD-10-CM

## 2021-05-18 PROCEDURE — 99214 OFFICE O/P EST MOD 30 MIN: CPT | Performed by: FAMILY MEDICINE

## 2021-05-18 RX ORDER — NALTREXONE 100 %
POWDER (GRAM) MISCELLANEOUS
Qty: 30 G | Refills: 0 | Status: SHIPPED | OUTPATIENT
Start: 2021-05-18 | End: 2021-07-30

## 2021-05-18 RX ORDER — NALTREXONE 100 %
POWDER (GRAM) MISCELLANEOUS
Qty: 135 G | Refills: 1 | Status: SHIPPED | OUTPATIENT
Start: 2021-05-18 | End: 2021-12-15

## 2021-05-18 ASSESSMENT — MIFFLIN-ST. JEOR: SCORE: 1449.74

## 2021-05-18 ASSESSMENT — PAIN SCALES - GENERAL: PAINLEVEL: NO PAIN (0)

## 2021-05-18 NOTE — NURSING NOTE
"Chief Complaint   Patient presents with     Recheck Medication       Initial /70   Pulse 70   Temp 96.8  F (36  C)   Resp 18   Ht 1.727 m (5' 8\")   Wt 71.1 kg (156 lb 12.8 oz)   LMP 05/11/2021 (Exact Date)   SpO2 99%   BMI 23.84 kg/m   Estimated body mass index is 23.84 kg/m  as calculated from the following:    Height as of this encounter: 1.727 m (5' 8\").    Weight as of this encounter: 71.1 kg (156 lb 12.8 oz).  Medication Reconciliation: paulette Novoa  "

## 2021-05-18 NOTE — TELEPHONE ENCOUNTER
Left detailed VM with information [facility] [phone] instructed to call back to clarify and to ask any questions.  Ashley Finney LPN

## 2021-06-15 DIAGNOSIS — E28.2 PCOS (POLYCYSTIC OVARIAN SYNDROME): ICD-10-CM

## 2021-06-15 DIAGNOSIS — N94.3 PREMENSTRUAL SYNDROME: ICD-10-CM

## 2021-06-15 DIAGNOSIS — Z31.61 PROCREATIVE COUNSELING AND ADVICE USING NATURAL FAMILY PLANNING: ICD-10-CM

## 2021-06-16 RX ORDER — PROGESTERONE 200 MG/1
CAPSULE ORAL
Qty: 30 CAPSULE | Refills: 1 | Status: SHIPPED | OUTPATIENT
Start: 2021-06-16 | End: 2021-12-16

## 2021-06-16 NOTE — TELEPHONE ENCOUNTER
progesterone (PROMETRIUM) 200 MG capsule      Last Written Prescription Date:  3/18/2021  Last Fill Quantity: 30,   # refills: 0  Last Office Visit:  5/18/2021   Future Office visit:    Next 5 appointments (look out 90 days)    Jul 30, 2021  9:30 AM  (Arrive by 9:15 AM)  SHORT with Ludy Reza MD  [facility] [place] ([facility] [place] ) [address]  [place] [address]  [phone]

## 2021-07-21 NOTE — PROGRESS NOTES
Clinic Visit  Date of visit: 2021   Chief Complaint:   Chief Complaint   Patient presents with     RECHECK       HPI:   Viki Mota is a 36 year old  female who presents to clinic today for follow up  for PCOS     Menarche: age 11  Menses: frequency: every 24 - 30 days. Patient's last menstrual period was 2021 (exact date).   Family Planning Chart: Yes: improved mucus patch this last cycle.  Acne: No.  Hirsutism (facial hair): No.  Male-pattern hair loss: No.  Elevated serum androgen:   Lab Results   Component Value Date    DHEA 83 2017    TESTOSTTOTAL 34 2017    FT 0.21 2017    FELISA 0.912 2017   ]  BMI: Body mass index is 24.08 kg/m .   Type 2 DM: No.  Elevated Cholesterol: No.  Mood disorder: No.    Ultrasound: 2019     Gynecologic History:  Last Pap:   Lab Results   Component Value Date    PAP NIL 2017      History of abnormal pap: Yes: , abnormal cells.    Obstetric History:   OB History    Para Term  AB Living   3 3 1 0 0 0   SAB TAB Ectopic Multiple Live Births   0 0 0 0 0      # Outcome Date GA Lbr Chi/2nd Weight Sex Delivery Anes PTL Lv   3 Term 03/15/18 40w2d 21:50 / 00:27 3.805 kg (8 lb 6.2 oz) F Vag-Spont INT N       Name: JITENDRA MOTA      Apgar1: 7  Apgar5: 8   2 Para            1 Para               Obstetric Comments   Breast and bottle fed     Obstetric history significant for:  no complications    Medications:  acetylcysteine (N-ACETYL CYSTEINE) 600 MG CAPS capsule, Take 1 capsule (600 mg) by mouth 2 times daily  Multiple Vitamins-Minerals (OPTIVITE P.M.T.) TABS, Take 3 tablets by mouth 2 times daily  Naltrexone POWD, Rx#2  Take 4.5 mg po at bedtime  Omega-3 Fatty Acids (FISH OIL-OMEGA-3 FATTY ACID) 1000 MG DR capsule, Take 1 capsule (1 g) by mouth 2 times daily  progesterone (PROMETRIUM) 200 MG capsule, TAKE 1 CAPSULE(200 MG) BY MOUTH AT BEDTIME    No current facility-administered medications on file prior to  visit.      Allergy:  No Known Allergies    Medical History:  Past Medical History:   Diagnosis Date     Abnormal cervical Papanicolaou smear 2010     PCOS (polycystic ovarian syndrome) 2017     Premenstrual syndrome 2017       Surgical History:  Past Surgical History:   Procedure Laterality Date     HC TOOTH EXTRACTION W/FORCEP Bilateral 06/01/2016       Social History:  Social History    Substance and Sexual Activity      Alcohol use: Yes        Comment: 2-3x/month    History   Smoking Status     Former Smoker     Packs/day: 0.20     Years: 6.00     Types: Cigarettes   Smokeless Tobacco     Never Used     Comment: quit 2010     History   Drug Use No     History   Sexual Activity     Sexual activity: Yes     Partners: Male     Birth control/ protection: Natural Family Planning     Comment: OCPs for 1 yr in , & again for 8 yrs     Relationship status is:  / Partnered    . Years.  Name of Spouse / Partner:  ..    Lives in Stable housing and no concerns with Spouse and Children.   Employment: Employed full time  History of sexual, physical or mental abuse: No.   She denies current fears or concerns for personal safety.      Review of Systems:    GENERAL: No change in weight, sleep or appetite.  Normal energy.  No fever or chills  EYES: Negative for vision changes or eye problems  ENT: No problems with ears, nose or throat.  No difficulty swallowing.  RESP: No coughing, wheezing or shortness of breath  CV: No chest pains or palpitations  GI: No nausea, vomiting,  heartburn, abdominal pain, diarrhea, constipation or change in bowel habits  : No urinary frequency or dysuria, bladder or kidney problems  MUSCULOSKELETAL: No significant muscle or joint pains  NEUROLOGIC: No headaches, numbness, tingling, weakness, problems with balance or coordination  PSYCHIATRIC: No problems with anxiety, depression or mental health  HEME/IMMUNE/ALLERGY: No history of bleeding or clotting problems or anemia.  No allergies or  "immune system problems  ENDOCRINE: No history of thyroid disease, diabetes or other endocrine disorders  SKIN: No rashes,worrisome lesions or skin problems      Physical Exam:  Vitals:    21 0925   BP: 102/64   Pulse: 64   Resp: 18   Temp: 96.9  F (36.1  C)   SpO2: 100%   Weight: 71.8 kg (158 lb 6.4 oz)   Height: 1.727 m (5' 8\")     Body mass index is 24.08 kg/m .    Gen: NAD, Awake and alert, cooperative with exam  CV: regular rate and rhythm  Resp: lungs clear to auscultation bilaterally  Abd: soft, nontender, nondistended, no rebound, no guarding  Extremities: nontender, no edema  Psych: normal mentation and mood    Assessment/Plan:  Viki Busch is a 36 year old  who presents for follow up  for PCOS.   (E28.2) PCOS (polycystic ovarian syndrome)  (primary encounter diagnosis)  Comment:   Plan: doing well with naltrexone, low dose, cycles are improving, better mucus patch  Plan for peak +7 labs these next 2 cycles and follow-up 2 mos    (N94.3) Premenstrual syndrome  Comment:   Plan: improved symptoms with LDN  Follow-up 2 mos    (Z31.61) Procreative counseling and advice using natural family planning  Comment:   Plan: ok to try       Ludy Reza MD     "

## 2021-07-30 ENCOUNTER — OFFICE VISIT (OUTPATIENT)
Dept: FAMILY MEDICINE | Facility: OTHER | Age: 36
End: 2021-07-30
Attending: FAMILY MEDICINE
Payer: COMMERCIAL

## 2021-07-30 VITALS
SYSTOLIC BLOOD PRESSURE: 102 MMHG | OXYGEN SATURATION: 100 % | HEART RATE: 64 BPM | RESPIRATION RATE: 18 BRPM | DIASTOLIC BLOOD PRESSURE: 64 MMHG | TEMPERATURE: 96.9 F | BODY MASS INDEX: 24.01 KG/M2 | HEIGHT: 68 IN | WEIGHT: 158.4 LBS

## 2021-07-30 DIAGNOSIS — E28.2 PCOS (POLYCYSTIC OVARIAN SYNDROME): Primary | ICD-10-CM

## 2021-07-30 DIAGNOSIS — N94.3 PREMENSTRUAL SYNDROME: ICD-10-CM

## 2021-07-30 DIAGNOSIS — Z31.61 PROCREATIVE COUNSELING AND ADVICE USING NATURAL FAMILY PLANNING: ICD-10-CM

## 2021-07-30 PROCEDURE — 99214 OFFICE O/P EST MOD 30 MIN: CPT | Performed by: FAMILY MEDICINE

## 2021-07-30 ASSESSMENT — MIFFLIN-ST. JEOR: SCORE: 1452

## 2021-07-30 ASSESSMENT — PAIN SCALES - GENERAL: PAINLEVEL: NO PAIN (0)

## 2021-07-30 NOTE — NURSING NOTE
"Chief Complaint   Patient presents with     RECHECK       Initial /64   Pulse 64   Temp 96.9  F (36.1  C)   Resp 18   Ht 1.727 m (5' 8\")   Wt 71.8 kg (158 lb 6.4 oz)   LMP 07/27/2021 (Exact Date)   SpO2 100%   BMI 24.08 kg/m   Estimated body mass index is 24.08 kg/m  as calculated from the following:    Height as of this encounter: 1.727 m (5' 8\").    Weight as of this encounter: 71.8 kg (158 lb 6.4 oz).  Medication Reconciliation: paulette Novoa  "

## 2021-08-04 ENCOUNTER — NURSE TRIAGE (OUTPATIENT)
Dept: FAMILY MEDICINE | Facility: OTHER | Age: 36
End: 2021-08-04

## 2021-08-04 NOTE — TELEPHONE ENCOUNTER
"    Reason for Disposition    Mild localized rash    Answer Assessment - Initial Assessment Questions  1. APPEARANCE of RASH: \"Describe the rash.\"       2\"x2\" red blotchy rash a little raised   2. LOCATION: \"Where is the rash located?\"       Hip and back  3. NUMBER: \"How many spots are there?\"       2  4. SIZE: \"How big are the spots?\" (Inches, centimeters or compare to size of a coin)       2\" x2\"  5. ONSET: \"When did the rash start?\"       Friday  6. ITCHING: \"Does the rash itch?\" If so, ask: \"How bad is the itch?\"  (Scale 1-10; or mild, moderate, severe)      Not itches but burns and hurts to touch  7. PAIN: \"Does the rash hurt?\" If so, ask: \"How bad is the pain?\"  (Scale 1-10; or mild, moderate, severe)      4/10  8. OTHER SYMPTOMS: \"Do you have any other symptoms?\" (e.g., fever)      no  9. PREGNANCY: \"Is there any chance you are pregnant?\" \"When was your last menstrual period?\"      no    Protocols used: RASH OR REDNESS - [id number]      "

## 2021-08-06 ENCOUNTER — MYC MEDICAL ADVICE (OUTPATIENT)
Dept: FAMILY MEDICINE | Facility: OTHER | Age: 36
End: 2021-08-06

## 2021-08-16 ENCOUNTER — LAB (OUTPATIENT)
Dept: LAB | Facility: OTHER | Age: 36
End: 2021-08-16
Payer: COMMERCIAL

## 2021-08-16 DIAGNOSIS — E28.2 PCOS (POLYCYSTIC OVARIAN SYNDROME): ICD-10-CM

## 2021-08-16 DIAGNOSIS — Z31.61 PROCREATIVE COUNSELING AND ADVICE USING NATURAL FAMILY PLANNING: ICD-10-CM

## 2021-08-16 DIAGNOSIS — N94.3 PREMENSTRUAL SYNDROME: ICD-10-CM

## 2021-08-16 LAB
ESTRADIOL SERPL-MCNC: 120 PG/ML
PROGEST SERPL-MCNC: 19.2 NG/ML

## 2021-08-16 PROCEDURE — 84144 ASSAY OF PROGESTERONE: CPT

## 2021-08-16 PROCEDURE — 36415 COLL VENOUS BLD VENIPUNCTURE: CPT

## 2021-08-16 PROCEDURE — 82670 ASSAY OF TOTAL ESTRADIOL: CPT

## 2021-08-25 ENCOUNTER — MYC MEDICAL ADVICE (OUTPATIENT)
Dept: FAMILY MEDICINE | Facility: OTHER | Age: 36
End: 2021-08-25

## 2021-08-26 ENCOUNTER — MYC MEDICAL ADVICE (OUTPATIENT)
Dept: FAMILY MEDICINE | Facility: OTHER | Age: 36
End: 2021-08-26

## 2021-08-26 ENCOUNTER — LAB (OUTPATIENT)
Dept: LAB | Facility: OTHER | Age: 36
End: 2021-08-26
Payer: COMMERCIAL

## 2021-08-26 ENCOUNTER — OFFICE VISIT (OUTPATIENT)
Dept: FAMILY MEDICINE | Facility: OTHER | Age: 36
End: 2021-08-26
Attending: FAMILY MEDICINE
Payer: COMMERCIAL

## 2021-08-26 VITALS
RESPIRATION RATE: 16 BRPM | WEIGHT: 159 LBS | HEART RATE: 84 BPM | TEMPERATURE: 98.6 F | BODY MASS INDEX: 24.18 KG/M2 | SYSTOLIC BLOOD PRESSURE: 110 MMHG | DIASTOLIC BLOOD PRESSURE: 64 MMHG | OXYGEN SATURATION: 98 %

## 2021-08-26 DIAGNOSIS — Z31.61 PROCREATIVE COUNSELING AND ADVICE USING NATURAL FAMILY PLANNING: ICD-10-CM

## 2021-08-26 DIAGNOSIS — Z32.01 POSITIVE URINE PREGNANCY TEST: ICD-10-CM

## 2021-08-26 DIAGNOSIS — E28.2 PCOS (POLYCYSTIC OVARIAN SYNDROME): ICD-10-CM

## 2021-08-26 DIAGNOSIS — N91.2 AMENORRHEA: ICD-10-CM

## 2021-08-26 DIAGNOSIS — R79.89 LOW SERUM PROGESTERONE: ICD-10-CM

## 2021-08-26 DIAGNOSIS — Z32.01 POSITIVE URINE PREGNANCY TEST: Primary | ICD-10-CM

## 2021-08-26 DIAGNOSIS — N94.3 PREMENSTRUAL SYNDROME: ICD-10-CM

## 2021-08-26 DIAGNOSIS — O09.521 MULTIGRAVIDA OF ADVANCED MATERNAL AGE IN FIRST TRIMESTER: ICD-10-CM

## 2021-08-26 DIAGNOSIS — O09.891 SUPERVISION OF OTHER HIGH RISK PREGNANCIES, FIRST TRIMESTER: ICD-10-CM

## 2021-08-26 LAB
B-HCG SERPL-ACNC: 13 IU/L (ref 0–5)
HCG UR QL: NEGATIVE

## 2021-08-26 PROCEDURE — 81025 URINE PREGNANCY TEST: CPT

## 2021-08-26 PROCEDURE — 82670 ASSAY OF TOTAL ESTRADIOL: CPT | Performed by: FAMILY MEDICINE

## 2021-08-26 PROCEDURE — 84702 CHORIONIC GONADOTROPIN TEST: CPT | Performed by: FAMILY MEDICINE

## 2021-08-26 PROCEDURE — 36415 COLL VENOUS BLD VENIPUNCTURE: CPT | Performed by: FAMILY MEDICINE

## 2021-08-26 PROCEDURE — 84144 ASSAY OF PROGESTERONE: CPT | Performed by: FAMILY MEDICINE

## 2021-08-26 PROCEDURE — 99213 OFFICE O/P EST LOW 20 MIN: CPT | Performed by: FAMILY MEDICINE

## 2021-08-26 ASSESSMENT — PAIN SCALES - GENERAL: PAINLEVEL: NO PAIN (0)

## 2021-08-26 NOTE — PROGRESS NOTES
Assessment & Plan     Positive urine pregnancy test    - HCG Qual, Urine ([id number]); Future    Amenorrhea    - HCG Quantitative Pregnancy, Blood ([id number]); Future  - HCG Quantitative Pregnancy, Blood ([id number])    Low serum progesterone  Continue progesterone vaginal and oral  - Progesterone; Standing  - Progesterone    Supervision of other high risk pregnancies, first trimester  Quantitative positive, schedule ultrasound in 4-5 wks  LUCINDA 5/3/22    Multigravida of advanced maternal age in first trimester  As above          Patient was agreeable to this plan and had no further questions.  There are no Patient Instructions on file for this visit.    No follow-ups on file.    Ludy Reza MD  [facility] - [facility] Drew is a 36 year old who presents for the following health issues     HPI     Concern - confirm pregnancy   Onset: LMP 7-27-21  Description: little bit of nausea, cramping, breast tenderness   Progression of Symptoms:  same  Accompanying Signs & Symptoms: Little spotting on day 22 and was waiting for period and took home Tuesday and it was positive.   Previous history of similar problem: na  Therapies tried and outcome: None    Review of Systems   Constitutional, HEENT, cardiovascular, pulmonary, gi and gu systems are negative, except as otherwise noted.      Objective    /64 (BP Location: Left arm, Patient Position: Chair, Cuff Size: Adult Regular)   Pulse 84   Temp 98.6  F (37  C) (Tympanic)   Resp 16   Wt 72.1 kg (159 lb)   LMP 07/27/2021 (Exact Date)   SpO2 98%   BMI 24.18 kg/m    Body mass index is 24.18 kg/m .  Physical Exam   GENERAL: healthy, alert and no distress  NECK: no adenopathy, no asymmetry, masses, or scars and thyroid normal to palpation  PSYCH: mentation appears normal, affect normal/bright    Results for orders placed or performed in visit on 08/26/21   HCG Quantitative Pregnancy, Blood ([id number])     Status: Abnormal   Result Value Ref Range     hCG Quantitative 13 (H) 0 - 5 IU/L   Results for orders placed or performed in visit on 08/26/21   HCG Qual, Urine ([id number])     Status: Normal   Result Value Ref Range    hCG Urine Qualitative Negative Negative

## 2021-08-26 NOTE — NURSING NOTE
"Chief Complaint   Patient presents with     Confirmation Of Pregnancy       Initial /64 (BP Location: Left arm, Patient Position: Chair, Cuff Size: Adult Regular)   Pulse 84   Temp 98.6  F (37  C) (Tympanic)   Resp 16   Wt 72.1 kg (159 lb)   LMP 07/27/2021 (Exact Date)   SpO2 98%   BMI 24.18 kg/m   Estimated body mass index is 24.18 kg/m  as calculated from the following:    Height as of 7/30/21: 1.727 m (5' 8\").    Weight as of this encounter: 72.1 kg (159 lb).  Medication Reconciliation: complete  Terri Guerrero LPN    "

## 2021-08-28 LAB
ESTRADIOL SERPL-MCNC: 54 PG/ML
PROGEST SERPL-MCNC: 5.4 NG/ML

## 2021-08-30 ENCOUNTER — TELEPHONE (OUTPATIENT)
Dept: FAMILY MEDICINE | Facility: OTHER | Age: 36
End: 2021-08-30

## 2021-08-30 ENCOUNTER — MYC MEDICAL ADVICE (OUTPATIENT)
Dept: FAMILY MEDICINE | Facility: OTHER | Age: 36
End: 2021-08-30

## 2021-10-01 ENCOUNTER — LAB (OUTPATIENT)
Dept: LAB | Facility: OTHER | Age: 36
End: 2021-10-01
Payer: COMMERCIAL

## 2021-10-01 ENCOUNTER — MYC MEDICAL ADVICE (OUTPATIENT)
Dept: FAMILY MEDICINE | Facility: OTHER | Age: 36
End: 2021-10-01

## 2021-10-01 DIAGNOSIS — N94.3 PREMENSTRUAL SYNDROME: ICD-10-CM

## 2021-10-01 DIAGNOSIS — Z31.61 PROCREATIVE COUNSELING AND ADVICE USING NATURAL FAMILY PLANNING: ICD-10-CM

## 2021-10-01 DIAGNOSIS — E28.2 PCOS (POLYCYSTIC OVARIAN SYNDROME): ICD-10-CM

## 2021-10-01 DIAGNOSIS — R79.89 LOW SERUM PROGESTERONE: ICD-10-CM

## 2021-10-01 LAB
ESTRADIOL SERPL-MCNC: 250 PG/ML
PROGEST SERPL-MCNC: 30.5 NG/ML

## 2021-10-01 PROCEDURE — 36415 COLL VENOUS BLD VENIPUNCTURE: CPT

## 2021-10-01 PROCEDURE — 84144 ASSAY OF PROGESTERONE: CPT

## 2021-10-01 PROCEDURE — 82670 ASSAY OF TOTAL ESTRADIOL: CPT

## 2021-10-04 ENCOUNTER — MYC MEDICAL ADVICE (OUTPATIENT)
Dept: FAMILY MEDICINE | Facility: OTHER | Age: 36
End: 2021-10-04

## 2021-10-04 ENCOUNTER — E-VISIT (OUTPATIENT)
Dept: FAMILY MEDICINE | Facility: OTHER | Age: 36
End: 2021-10-04
Attending: FAMILY MEDICINE
Payer: COMMERCIAL

## 2021-10-04 DIAGNOSIS — R79.89 LOW SERUM PROGESTERONE: Primary | ICD-10-CM

## 2021-10-04 DIAGNOSIS — N91.2 AMENORRHEA: Primary | ICD-10-CM

## 2021-10-04 DIAGNOSIS — Z34.81 ENCOUNTER FOR SUPERVISION OF OTHER NORMAL PREGNANCY, FIRST TRIMESTER: ICD-10-CM

## 2021-10-04 PROCEDURE — 99423 OL DIG E/M SVC 21+ MIN: CPT | Performed by: FAMILY MEDICINE

## 2021-10-04 RX ORDER — PROGESTERONE 100 MG/1
100 CAPSULE ORAL AT BEDTIME
Qty: 60 CAPSULE | Refills: 1 | Status: SHIPPED | OUTPATIENT
Start: 2021-10-04 | End: 2021-12-07

## 2021-10-04 NOTE — TELEPHONE ENCOUNTER
Provider E-Visit time total (minutes): 21  ELECTRONIC VISIT DOCUMENTATION:    SUBJECTIVE:  Note above accurately captures the substance of my conversation with the patient.    ASSESSMENT/ PLAN:  1. Amenorrhea  Will come in a few days for labs  - HCG Qual, Urine ([id number]); Future    2. Encounter for supervision of other normal pregnancy, first trimester  Take progesterone with history of miscarriage and low progesterone  - US OB <14 Weeks w Transvaginal Single; Future      Ludy Reza MD  10/4/2021  3:15 PM

## 2021-10-07 ENCOUNTER — LAB (OUTPATIENT)
Dept: LAB | Facility: OTHER | Age: 36
End: 2021-10-07
Payer: COMMERCIAL

## 2021-10-07 DIAGNOSIS — Z32.01 PREGNANCY TEST POSITIVE: Primary | ICD-10-CM

## 2021-10-07 DIAGNOSIS — N91.2 AMENORRHEA: ICD-10-CM

## 2021-10-07 DIAGNOSIS — R79.89 LOW SERUM PROGESTERONE: ICD-10-CM

## 2021-10-07 LAB
HCG UR QL: POSITIVE
PROGEST SERPL-MCNC: 28.4 NG/ML

## 2021-10-07 PROCEDURE — 36415 COLL VENOUS BLD VENIPUNCTURE: CPT

## 2021-10-07 PROCEDURE — 81025 URINE PREGNANCY TEST: CPT

## 2021-10-07 PROCEDURE — 84144 ASSAY OF PROGESTERONE: CPT

## 2021-10-07 RX ORDER — PRENATAL VIT/IRON FUM/FOLIC AC 27MG-0.8MG
1 TABLET ORAL DAILY
Qty: 90 TABLET | Refills: 3 | Status: SHIPPED | OUTPATIENT
Start: 2021-10-07 | End: 2022-03-16

## 2021-10-09 ENCOUNTER — HEALTH MAINTENANCE LETTER (OUTPATIENT)
Age: 36
End: 2021-10-09

## 2021-10-13 ENCOUNTER — MYC MEDICAL ADVICE (OUTPATIENT)
Dept: FAMILY MEDICINE | Facility: OTHER | Age: 36
End: 2021-10-13

## 2021-10-19 ENCOUNTER — HOSPITAL ENCOUNTER (OUTPATIENT)
Dept: ULTRASOUND IMAGING | Facility: HOSPITAL | Age: 36
Discharge: HOME | End: 2021-10-19
Attending: FAMILY MEDICINE | Admitting: FAMILY MEDICINE
Payer: COMMERCIAL

## 2021-10-19 DIAGNOSIS — Z34.81 ENCOUNTER FOR SUPERVISION OF OTHER NORMAL PREGNANCY, FIRST TRIMESTER: ICD-10-CM

## 2021-10-19 PROCEDURE — 76801 OB US < 14 WKS SINGLE FETUS: CPT

## 2021-10-20 ENCOUNTER — TELEPHONE (OUTPATIENT)
Dept: FAMILY MEDICINE | Facility: OTHER | Age: 36
End: 2021-10-20

## 2021-10-20 NOTE — TELEPHONE ENCOUNTER
2:18 PM    Reason for Call: Phone Call    This message came into [employer]. Please advise pt.      Message    We had an E-visit a few weeks ago.       Appointment Request    Viki Busch  Patient Appointment Schedule Request Pool Yesterday (1:53 PM)     [facility]      We had an E-visit a few weeks ago.         You  Viki Busch Yesterday (1:50 PM)     [facility] Viki     Would this be a first time visit with Dr. Ludy Reza with this OB visit or has she seen you about this recently?     Viki Mantilla  Patient Appointment Schedule Request Pool Yesterday (10:17 AM)     [facility]      Appointment Request From: Viki Busch     With Provider: Ludy Reza MD [[facility]]     Preferred Date Range: 11/11/2021 - 11/12/2021     Preferred Times: Any Time     Reason for visit: Request an Appointment     Comments:  OB Visit

## 2021-11-11 NOTE — PROGRESS NOTES
Assessment & Plan     Supervision of other high risk pregnancies, first trimester  Doing well, was anxious about having another SAB, but she is doing really well  - CBC with platelets; Future  - Rubella Antibody IgG; Future  - Treponema Abs w Reflex to RPR and Titer; Future  - Hepatitis B surface antigen; Future  - Hepatitis C antibody; Future  - HIV Antigen Antibody Combo; Future  - Urine Culture; Future  - TSH with free T4 reflex; Future  - GC/Chlamydia by PCR - HI,[facility]; Future  - ABO/Rh type and screen; Future  - Wet prep  Follow-up 4 wks  Discussed quad screen, invitae testing    Low serum progesterone  Labs pending today      Provider  Link to [facility] Help Grid :[id number]}    Patient was agreeable to this plan and had no further questions.  There are no Patient Instructions on file for this visit.    No follow-ups on file.    Ludy Reza MD  [facility] [place]    Female History and Physical for NEW OB appt     Viki Busch MRN# [id number]   YOB: 1985 Age: 36 year old     Primary care provider: Ludy Reza                 Chief Complaint:   History is obtained from the patient         History of Present Illness:   This patient is a 36 year old female who presents for first ob appt             Past Medical History:     Past Medical History:   Diagnosis Date     Abnormal cervical Papanicolaou smear 2010     PCOS (polycystic ovarian syndrome) 2017     Premenstrual syndrome 2017             Past Surgical History:     Past Surgical History:   Procedure Laterality Date     HC TOOTH EXTRACTION W/FORCEP Bilateral 06/01/2016              Gynecologic History:   Patient's last menstrual period was 08/30/2021 (approximate).          Obstetrical History:   See Epic         Social History:     Social History     Tobacco Use     Smoking status: Former Smoker     Packs/day: 0.20     Years: 6.00     Pack years: 1.20     Types: Cigarettes     Smokeless tobacco: Never Used     Tobacco  comment: quit 2010   Substance Use Topics     Alcohol use: Yes     Comment: 2-3x/month             Family History:     Family History   Problem Relation Age of Onset     Thyroid Disease Mother      Hyperlipidemia Father      Family History Negative Sister      Family History Negative Brother      Family History Negative Brother      Other - See Comments Maternal Grandmother         depression     Other - See Comments Maternal Grandfather         old age     Other - See Comments Paternal Grandmother         old age     Other - See Comments Paternal Grandfather         old age             Immunizations:     Immunization History   Administered Date(s) Administered     Influenza Vaccine IM > 6 months Valent IIV4 (Alfuria,Fluzone) 02/14/2018     TDAP Vaccine (Adacel) 04/07/2017, 12/20/2017            Allergies:   No Known Allergies          Medications:     Current Outpatient Medications:      Naltrexone POWD, Rx#2  Take 4.5 mg po at bedtime, Disp: 135 g, Rfl: 1     Prenatal Vit-Fe Fumarate-FA (PRENATAL MULTIVITAMIN W/IRON) 27-0.8 MG tablet, Take 1 tablet by mouth daily, Disp: 90 tablet, Rfl: 3     progesterone (PROMETRIUM) 100 MG capsule, Take 1 capsule (100 mg) by mouth At Bedtime And 1 capsule vaginally at bedtime, Disp: 60 capsule, Rfl: 1     progesterone (PROMETRIUM) 200 MG capsule, TAKE 1 CAPSULE(200 MG) BY MOUTH AT BEDTIME, Disp: 30 capsule, Rfl: 1     acetylcysteine (N-ACETYL CYSTEINE) 600 MG CAPS capsule, Take 1 capsule (600 mg) by mouth 2 times daily (Patient not taking: Reported on 11/12/2021), Disp: 180 capsule, Rfl: 1     Multiple Vitamins-Minerals (OPTIVITE P.M.T.) TABS, Take 3 tablets by mouth 2 times daily (Patient not taking: Reported on 11/12/2021), Disp: 540 tablet, Rfl: 3     Omega-3 Fatty Acids (FISH OIL-OMEGA-3 FATTY ACID) 1000 MG DR capsule, Take 1 capsule (1 g) by mouth 2 times daily (Patient not taking: Reported on 11/12/2021), Disp: 180 capsule, Rfl: 1            Review of Systems:   A  comprehensive, 10 point review of systems was performed and found to be negative              Physical Exam:   Vitals were reviewed  Blood pressure 112/78, pulse 88, temperature 97.5  F (36.4  C), resp. rate 18, weight 74.8 kg (164 lb 12.8 oz), last menstrual period 08/30/2021, SpO2 100 %, unknown if currently breastfeeding.  Constitutional:   awake, alert, cooperative, no apparent distress, and appears stated age   Eyes:   Lids and lashes normal, pupils equal, round and reactive to light, extra ocular muscles intact, sclera clear, conjunctiva normal   ENT:   Normocephalic, without obvious abnormality, atramatic, external ears without lesions, oral pharynx with moist mucus membranes, tonsils without erythema or exudates, gums normal and good dentition.   Neck:   Supple, symmetrical, trachea midline, no adenopathy, thyroid symmetric, not enlarged and no tenderness, skin normal   Hematologic / Lymphatic:   no cervical lymphadenopathy   Lungs:   No increased work of breathing, good air exchange, clear to auscultation bilaterally, no crackles or wheezing   Cardiovascular:   Normal apical impulse, regular rate and rhythm, normal S1 and S2, no S3 or S4, and no murmur noted   Abdomen:   No scars, normal bowel sounds, soft, non-distended, non-tender, no masses palpated, no hepatosplenomegally   Chest / Breast:   Breasts symmetrical, skin without lesion(s), no nipple retraction or dimpling, no nipple discharge, no masses palpated, no axillary or supraclavicular adenopathy   Genitounirinary:   External Genitalia:  General appearance; normal  Urethra:  Fullness absent, Masses absent  Bladder:  Fullness absent, Masses absent, Tenderness absent  Vagina:  General appearance normal, Estrogen effect normal  Cervix:  General appearance normal, Lesions absent, Discharge absent, wet prep done  Uterus:  Size normal, Contour normal, Position normal, for 11-12 wk GA  Adenexa:  Masses absent, Tenderness absent, Enlargement absent    Musculoskeletal:   There is no redness, warmth, or swelling of the joints.  Full range of motion noted.  Motor strength is 5 out of 5 all extremities bilaterally.  Tone is normal.   Neurologic:   Awake, alert, oriented to name, place and time.  Cranial nerves II-XII are grossly intact.  Motor is 5 out of 5 bilaterally.  Sensory is intact.  and gait is normal.   Neuropsychiatric:   General: normal, calm and normal eye contact  Level of consciousness: alert / normal  Affect: normal and pleasant  Orientation: oriented to self, place, time and situation  Memory and insight: normal, memory for past and recent events intact and thought process normal   Skin:   no bruising or bleeding, Body Locations:  Scattered tattooes          Data:     No visits with results within 1 Day(s) from this visit.   Latest known visit with results is:   Lab on 10/07/2021   Component Date Value Ref Range Status     Progesterone 10/07/2021 28.4  ng/mL Final     hCG Urine Qualitative 10/07/2021 Positive* Negative Final    This test is for screening purposes.  Results should be interpreted along with the clinical picture.  Confirmation testing is available if warranted by ordering [id number], HCG Quantitative Pregnancy.   ]         Assessment and Plan:   See above   Patient was agreeable to this plan and had no further questions.    Ludy Reza MD  11/12/2021  9:35 AM      Have you had or do you currently have:     - Diabetes? No  - Hypertension? No  - Heart disease, mitral valve prolapse, or rheumatic fever?  N0  - An autoimmune disease such as lupus or rheumatoid arthritis?  N0  - Kidney disease, urinary tract infection?  No  - Epilepsy, seizures, or spells?  No  - Migraine headaches?  No  - Any other neurological problems?  No  - Have you ever been treated for depression?  No  - Are you having problems with crying spells or loss of self-esteem?  No  - Have you ever required psychiatric care?  No  - Have you ever had hepatitis, liver  disease, or jaundice?  No  - Have you ever been treated for blood clots in your veins, deep vein thrombosis, inflammation in the veins, thrombosis, phelbitis, pulmonary embolism or varicosities?  No  - Have you had excessive bleeding after surgery or dental work?  No  - Do you bleed more than other women after a cut or scratch?  No  - Do you have a history or anemia?  No  - Have you ever had thyroid problems or take thyroid medication?  No  - Do you have any endocrine problems?  No  - Have you every been in a major accident or suffered serious trauma?  No  - Within the last year, has anyone hit, slapped, kicked, or otherwise hurt you?  No  - In the last year, has anyone forced you to have sex when you didn't want to?  No  - Have you every received a blood transfusion?  No  - Would you refuse a blood transfusion if a doctor judged it to be medically necessary?  No  - Does anyone in your home smoke? No  - Do you use tobacco products?  No  - Do you drink beer, wine, or hard liquor?  No  - Do you use any of the following: marijuana, speed, cocaine, heroin, hallucinogens, or other drugs?  No  - Is your blood type RH negative?  No  - Have you ever had asthma?  No  - Have you ever had tuberculosis?  No  - Do you have any allergies to drugs or over-the-counter medications?  No     - Allergies:KNA     - Have you had any breast problems?  No  - Have you ever breast-fed?  Yes  - Have you had any gynecological surgical procedures such as cervical conization, LEEP, laser treatment, cryosurgery of the cervix, or a dilatation and curettage, etc?  No  - Have you ever had any other surgical procedures?  Herod teeth removal   - Have you ever had any anesthetic complications?  No  - Have you ever had an abnormal pap smear?  Yes, 2007  - Do you have a history of abnormalities of the uterus? No  - Did your mother take CHANCE or any other hormones when she was pregnant with you?  Unknown   - Did it take you more than one year to become  pregnant?  No  - Have you ever been evaluated or treated for infertility?  No  - Is there a history of medical problems in your family, which you feel might adversely affect your health or pregnancy?  No  - Do you have any other problems we have not asked about which you feel may be important to this pregnancy?  No     Symptoms since last menstrual period  - Do you currently have any of the following symptoms: abdominal pain, blood in the stool or urine, chest pain, shortness of breath, coughing or vomiting up blood, you heart is racing or skipping beats, nausea and vomiting, pain on urination, or vaginal discharge or bleeding?  possible elevated heart rate     Genetic screening  Has the patient, baby's father, or anyone in either family had:  - Thalassemia (Italian, Greek, Mediterranean, or  background only) and an MCV result less than 80?  No  - Neural tube defect such as meningomyelocele, spina bifida, or anencephaly?  No  - Congential heart defect?  No  - Down's syndrome?  No  - Amari-Sachs disease ( Muslim, Cajun, Slovenian-Walsh)?  No  - Sickle cell disease or trait () ?  No  - Hemophilia or other inherited problems of blood?  No  - Muscular dystrophy?  No  - Cystic fibrosis?  No  - Marshall's chorea?  No  - Mental retardation/autism?  No              If yes, was the person tested for Fragile X?    - Any other inherited genetic or chromosomal disorder?    - Maternal metabolic disorder (e.g. insulin- dependent diabetes, PKU)?  No   - A child with birth defects not listed above?  No  - Recurrent pregnancy loss, or a stillbirth?  No  - Has the patient had any medications/street drugs/alcohol since her last menstrual period?  No  - Does the patient or baby's father have any other genetic risk?  No     Infection history  - Have you ever been treated for tuberculosis?  No  - Have you every had a positive skin test for tuberculosis?  No  - Do you live with someone who has tuberculosis?  No  - Have you  ever been exposed to tuberculosis?  No  - Do you have genital herpes?  No  - Does your partner have genital herpes?  No  - Have you had a rash or viral illness since your last period?  No  - Have you ever had gonorrhea, chlamydia, syphilis, venereal warts, trichomoniasis, pelvic inflammatory disease, or any other sexually transmitted disease?  No  - Have you had chicken pox?  Yes  - Have you been vaccinated against chicken pox?  No  - Have you had any other infectious diseases?  No

## 2021-11-12 ENCOUNTER — PRENATAL OFFICE VISIT (OUTPATIENT)
Dept: FAMILY MEDICINE | Facility: OTHER | Age: 36
End: 2021-11-12
Attending: FAMILY MEDICINE
Payer: COMMERCIAL

## 2021-11-12 ENCOUNTER — LAB (OUTPATIENT)
Dept: LAB | Facility: OTHER | Age: 36
End: 2021-11-12
Attending: FAMILY MEDICINE
Payer: COMMERCIAL

## 2021-11-12 VITALS
RESPIRATION RATE: 18 BRPM | TEMPERATURE: 97.5 F | DIASTOLIC BLOOD PRESSURE: 78 MMHG | SYSTOLIC BLOOD PRESSURE: 112 MMHG | BODY MASS INDEX: 25.06 KG/M2 | WEIGHT: 164.8 LBS | HEART RATE: 88 BPM | OXYGEN SATURATION: 100 %

## 2021-11-12 DIAGNOSIS — O09.891 SUPERVISION OF OTHER HIGH RISK PREGNANCIES, FIRST TRIMESTER: Primary | ICD-10-CM

## 2021-11-12 DIAGNOSIS — R79.89 LOW SERUM PROGESTERONE: ICD-10-CM

## 2021-11-12 LAB
CLUE CELLS: ABNORMAL
PROGEST SERPL-MCNC: 37.2 NG/ML
TRICHOMONAS, WET PREP: ABNORMAL
WBC'S/HIGH POWER FIELD, WET PREP: ABNORMAL
YEAST, WET PREP: ABNORMAL

## 2021-11-12 PROCEDURE — 87210 SMEAR WET MOUNT SALINE/INK: CPT | Performed by: FAMILY MEDICINE

## 2021-11-12 PROCEDURE — 36415 COLL VENOUS BLD VENIPUNCTURE: CPT

## 2021-11-12 PROCEDURE — 99207 PR FIRST OB VISIT: CPT | Performed by: FAMILY MEDICINE

## 2021-11-12 PROCEDURE — 84144 ASSAY OF PROGESTERONE: CPT

## 2021-11-12 SDOH — HEALTH STABILITY: PHYSICAL HEALTH: ON AVERAGE, HOW MANY DAYS PER WEEK DO YOU ENGAGE IN MODERATE TO STRENUOUS EXERCISE (LIKE A BRISK WALK)?: 2 DAYS

## 2021-11-12 SDOH — HEALTH STABILITY: PHYSICAL HEALTH: ON AVERAGE, HOW MANY MINUTES DO YOU ENGAGE IN EXERCISE AT THIS LEVEL?: 30 MIN

## 2021-11-12 ASSESSMENT — SOCIAL DETERMINANTS OF HEALTH (SDOH)

## 2021-11-12 ASSESSMENT — LIFESTYLE VARIABLES
HOW OFTEN DO YOU HAVE A DRINK CONTAINING ALCOHOL: MONTHLY OR LESS
HOW OFTEN DO YOU HAVE SIX OR MORE DRINKS ON ONE OCCASION: NEVER
HOW MANY STANDARD DRINKS CONTAINING ALCOHOL DO YOU HAVE ON A TYPICAL DAY: 1 OR 2

## 2021-11-12 ASSESSMENT — PAIN SCALES - GENERAL: PAINLEVEL: NO PAIN (0)

## 2021-11-12 NOTE — NURSING NOTE
"Chief Complaint   Patient presents with     Prenatal Care     Establish Care       Initial /78   Pulse 88   Temp 97.5  F (36.4  C)   Resp 18   Wt 74.8 kg (164 lb 12.8 oz)   LMP 08/30/2021 (Approximate)   SpO2 100%   BMI 25.06 kg/m   Estimated body mass index is 25.06 kg/m  as calculated from the following:    Height as of 7/30/21: 1.727 m (5' 8\").    Weight as of this encounter: 74.8 kg (164 lb 12.8 oz).  Medication Reconciliation: paulette Novoa  "

## 2021-11-29 ENCOUNTER — NURSE TRIAGE (OUTPATIENT)
Dept: FAMILY MEDICINE | Facility: OTHER | Age: 36
End: 2021-11-29
Payer: COMMERCIAL

## 2021-11-29 ENCOUNTER — OFFICE VISIT (OUTPATIENT)
Dept: FAMILY MEDICINE | Facility: OTHER | Age: 36
End: 2021-11-29
Attending: FAMILY MEDICINE
Payer: COMMERCIAL

## 2021-11-29 DIAGNOSIS — Z20.822 EXPOSURE TO 2019 NOVEL CORONAVIRUS: ICD-10-CM

## 2021-11-29 DIAGNOSIS — Z20.822 SUSPECTED 2019 NOVEL CORONAVIRUS INFECTION: ICD-10-CM

## 2021-11-29 DIAGNOSIS — Z20.822 EXPOSURE TO 2019 NOVEL CORONAVIRUS: Primary | ICD-10-CM

## 2021-11-29 LAB — SARS-COV-2 RNA RESP QL NAA+PROBE: NORMAL

## 2021-11-29 PROCEDURE — U0005 INFEC AGEN DETEC AMPLI PROBE: HCPCS | Mod: 90

## 2021-11-29 PROCEDURE — U0003 INFECTIOUS AGENT DETECTION BY NUCLEIC ACID (DNA OR RNA); SEVERE ACUTE RESPIRATORY SYNDROME CORONAVIRUS 2 (SARS-COV-2) (CORONAVIRUS DISEASE [COVID-19]), AMPLIFIED PROBE TECHNIQUE, MAKING USE OF HIGH THROUGHPUT TECHNOLOGIES AS DESCRIBED BY CMS-2020-01-R: HCPCS | Mod: 90

## 2021-11-30 LAB — SARS-COV-2 RNA RESP QL NAA+PROBE: DETECTED

## 2021-12-05 DIAGNOSIS — R79.89 LOW SERUM PROGESTERONE: ICD-10-CM

## 2021-12-07 RX ORDER — PROGESTERONE 100 MG/1
CAPSULE ORAL
Qty: 60 CAPSULE | Refills: 1 | Status: SHIPPED | OUTPATIENT
Start: 2021-12-07 | End: 2022-02-10

## 2021-12-07 NOTE — TELEPHONE ENCOUNTER
PROMETRIUM      Last Written Prescription Date:  10/4/2021  Last Fill Quantity: 60,   # refills: 1  Last Office Visit: 8/26/2021  Future Office visit:    Next 5 appointments (look out 90 days)    Dec 16, 2021  2:30 PM  (Arrive by 2:15 PM)  ESTABLISHED PRENATAL with Ludy Reza MD  [facility] [place] ([facility] [place] ) [address]  [place] [address]  [phone]   Jan 17, 2022  8:30 AM  (Arrive by 8:15 AM)  ESTABLISHED PRENATAL with Ludy Reza MD  [facility] [place] ([facility] [place] ) [address]  [place] [address]  [phone]           Routing refill request to provider for review/approval because:

## 2021-12-13 NOTE — TELEPHONE ENCOUNTER
Called patient back and patient did get period.  .Briana Edmonds LPN      
Should come in wed or Thursday, stay on progesterone  
13-Dec-1962

## 2021-12-15 DIAGNOSIS — N94.3 PREMENSTRUAL SYNDROME: ICD-10-CM

## 2021-12-15 DIAGNOSIS — E28.2 PCOS (POLYCYSTIC OVARIAN SYNDROME): ICD-10-CM

## 2021-12-15 RX ORDER — NALTREXONE 100 %
POWDER (GRAM) MISCELLANEOUS
Qty: 90 G | Refills: 1 | Status: SHIPPED | OUTPATIENT
Start: 2021-12-15 | End: 2022-04-11

## 2021-12-15 NOTE — TELEPHONE ENCOUNTER
Naltrexone      Last Written Prescription Date:  9.2.21  Last Fill Quantity: #90,   # refills: 0  Last Office Visit: 11.12.21  Future Office visit:    Next 5 appointments (look out 90 days)    Dec 16, 2021  2:30 PM  (Arrive by 2:15 PM)  ESTABLISHED PRENATAL with Ludy Reza MD  [facility] [place] ([facility] [place] ) [address]  [place] [address]  [phone]   Jan 17, 2022  8:30 AM  (Arrive by 8:15 AM)  ESTABLISHED PRENATAL with Ludy Reza MD  [facility] [place] ([facility] [place] ) [address]  [place] [address]  [phone]           Routing refill request to provider for review/approval because:  Drug not on the G, P or [facility] refill protocol or controlled substance

## 2021-12-16 ENCOUNTER — PRENATAL OFFICE VISIT (OUTPATIENT)
Dept: FAMILY MEDICINE | Facility: OTHER | Age: 36
End: 2021-12-16
Attending: FAMILY MEDICINE
Payer: COMMERCIAL

## 2021-12-16 ENCOUNTER — MYC MEDICAL ADVICE (OUTPATIENT)
Dept: FAMILY MEDICINE | Facility: OTHER | Age: 36
End: 2021-12-16
Payer: COMMERCIAL

## 2021-12-16 VITALS
HEART RATE: 78 BPM | HEIGHT: 68 IN | SYSTOLIC BLOOD PRESSURE: 120 MMHG | OXYGEN SATURATION: 99 % | WEIGHT: 168 LBS | DIASTOLIC BLOOD PRESSURE: 70 MMHG | BODY MASS INDEX: 25.46 KG/M2 | TEMPERATURE: 97.2 F

## 2021-12-16 DIAGNOSIS — N94.3 PREMENSTRUAL SYNDROME: ICD-10-CM

## 2021-12-16 DIAGNOSIS — O09.892 SUPERVISION OF OTHER HIGH RISK PREGNANCIES, SECOND TRIMESTER: Primary | ICD-10-CM

## 2021-12-16 PROCEDURE — 99207 PR COMPLICATED OB VISIT: CPT | Performed by: FAMILY MEDICINE

## 2021-12-16 ASSESSMENT — PAIN SCALES - GENERAL: PAINLEVEL: NO PAIN (0)

## 2021-12-16 ASSESSMENT — MIFFLIN-ST. JEOR: SCORE: 1495.54

## 2021-12-16 NOTE — NURSING NOTE
"Chief Complaint   Patient presents with     Prenatal Care       Initial /70 (BP Location: Right arm, Patient Position: Chair, Cuff Size: Adult Regular)   Pulse 78   Temp 97.2  F (36.2  C) (Tympanic)   Ht 1.727 m (5' 8\")   Wt 76.2 kg (168 lb)   LMP 08/30/2021 (Approximate)   SpO2 99%   BMI 25.54 kg/m   Estimated body mass index is 25.54 kg/m  as calculated from the following:    Height as of this encounter: 1.727 m (5' 8\").    Weight as of this encounter: 76.2 kg (168 lb).  Medication Reconciliation: complete  Rubi Sharma LPN  "

## 2021-12-16 NOTE — PROGRESS NOTES
Had covid a few weeks ago, doing well  Reviewed pictures from July/august -- definitely looks/sounds like she had shingles but this was right before pregancy occurred  Education done  Declines quad/invitae testing -- ultrasound in 4 wks

## 2021-12-17 ENCOUNTER — LAB (OUTPATIENT)
Dept: LAB | Facility: OTHER | Age: 36
End: 2021-12-17
Payer: COMMERCIAL

## 2021-12-17 DIAGNOSIS — R79.89 LOW SERUM PROGESTERONE: ICD-10-CM

## 2021-12-17 LAB — PROGEST SERPL-MCNC: 44.1 NG/ML

## 2021-12-17 PROCEDURE — 36415 COLL VENOUS BLD VENIPUNCTURE: CPT

## 2021-12-17 PROCEDURE — 84144 ASSAY OF PROGESTERONE: CPT

## 2022-01-17 ENCOUNTER — PRENATAL OFFICE VISIT (OUTPATIENT)
Dept: FAMILY MEDICINE | Facility: OTHER | Age: 37
End: 2022-01-17
Attending: FAMILY MEDICINE
Payer: COMMERCIAL

## 2022-01-17 VITALS
DIASTOLIC BLOOD PRESSURE: 70 MMHG | WEIGHT: 175.6 LBS | OXYGEN SATURATION: 100 % | BODY MASS INDEX: 26.7 KG/M2 | HEART RATE: 73 BPM | SYSTOLIC BLOOD PRESSURE: 112 MMHG | TEMPERATURE: 96.8 F

## 2022-01-17 DIAGNOSIS — O09.521 MULTIGRAVIDA OF ADVANCED MATERNAL AGE IN FIRST TRIMESTER: Primary | ICD-10-CM

## 2022-01-17 DIAGNOSIS — O09.892 SUPERVISION OF OTHER HIGH RISK PREGNANCIES, SECOND TRIMESTER: ICD-10-CM

## 2022-01-17 DIAGNOSIS — O09.891 SUPERVISION OF OTHER HIGH RISK PREGNANCIES, FIRST TRIMESTER: ICD-10-CM

## 2022-01-17 DIAGNOSIS — R79.89 LOW SERUM PROGESTERONE: ICD-10-CM

## 2022-01-17 LAB
ABO/RH(D): NORMAL
ANTIBODY SCREEN: NEGATIVE
C TRACH DNA SPEC QL PROBE+SIG AMP: NEGATIVE
ERYTHROCYTE [DISTWIDTH] IN BLOOD BY AUTOMATED COUNT: 12.8 % (ref 10–15)
HCT VFR BLD AUTO: 38.7 % (ref 35–47)
HGB BLD-MCNC: 13.4 G/DL (ref 11.7–15.7)
MCH RBC QN AUTO: 30.7 PG (ref 26.5–33)
MCHC RBC AUTO-ENTMCNC: 34.6 G/DL (ref 31.5–36.5)
MCV RBC AUTO: 89 FL (ref 78–100)
N GONORRHOEA DNA SPEC QL NAA+PROBE: NEGATIVE
PLATELET # BLD AUTO: 228 10E3/UL (ref 150–450)
PROGEST SERPL-MCNC: 58.7 NG/ML
RBC # BLD AUTO: 4.36 10E6/UL (ref 3.8–5.2)
SPECIMEN EXPIRATION DATE: NORMAL
TSH SERPL DL<=0.005 MIU/L-ACNC: 3.12 MU/L (ref 0.4–4)
WBC # BLD AUTO: 6.4 10E3/UL (ref 4–11)

## 2022-01-17 PROCEDURE — 36415 COLL VENOUS BLD VENIPUNCTURE: CPT | Performed by: FAMILY MEDICINE

## 2022-01-17 PROCEDURE — 87591 N.GONORRHOEAE DNA AMP PROB: CPT | Performed by: FAMILY MEDICINE

## 2022-01-17 PROCEDURE — 87086 URINE CULTURE/COLONY COUNT: CPT | Performed by: FAMILY MEDICINE

## 2022-01-17 PROCEDURE — 86780 TREPONEMA PALLIDUM: CPT | Performed by: FAMILY MEDICINE

## 2022-01-17 PROCEDURE — 86901 BLOOD TYPING SEROLOGIC RH(D): CPT | Performed by: FAMILY MEDICINE

## 2022-01-17 PROCEDURE — 86850 RBC ANTIBODY SCREEN: CPT | Performed by: FAMILY MEDICINE

## 2022-01-17 PROCEDURE — 87340 HEPATITIS B SURFACE AG IA: CPT | Performed by: FAMILY MEDICINE

## 2022-01-17 PROCEDURE — 87389 HIV-1 AG W/HIV-1&-2 AB AG IA: CPT | Performed by: FAMILY MEDICINE

## 2022-01-17 PROCEDURE — 86762 RUBELLA ANTIBODY: CPT | Performed by: FAMILY MEDICINE

## 2022-01-17 PROCEDURE — 84144 ASSAY OF PROGESTERONE: CPT | Performed by: FAMILY MEDICINE

## 2022-01-17 PROCEDURE — 86900 BLOOD TYPING SEROLOGIC ABO: CPT | Performed by: FAMILY MEDICINE

## 2022-01-17 PROCEDURE — 86803 HEPATITIS C AB TEST: CPT | Performed by: FAMILY MEDICINE

## 2022-01-17 PROCEDURE — 99207 PR COMPLICATED OB VISIT: CPT | Performed by: FAMILY MEDICINE

## 2022-01-17 PROCEDURE — 85027 COMPLETE CBC AUTOMATED: CPT | Performed by: FAMILY MEDICINE

## 2022-01-17 PROCEDURE — 84443 ASSAY THYROID STIM HORMONE: CPT | Performed by: FAMILY MEDICINE

## 2022-01-17 PROCEDURE — 87491 CHLMYD TRACH DNA AMP PROBE: CPT | Performed by: FAMILY MEDICINE

## 2022-01-17 ASSESSMENT — PAIN SCALES - GENERAL: PAINLEVEL: NO PAIN (0)

## 2022-01-17 NOTE — PROGRESS NOTES
Ultrasound tomorrow  Measuring ahead   gct and cbc next visit  Education today  More constipated today -- watch carbs, trial miralax  Discussed weights   Last baby 8.5 lbs -- early induction  Progesterone today

## 2022-01-17 NOTE — NURSING NOTE
"Chief Complaint   Patient presents with     Prenatal Care       Initial /70 (BP Location: Right arm)   Pulse 73   Temp 96.8  F (36  C)   Wt 79.7 kg (175 lb 9.6 oz)   LMP 08/30/2021 (Approximate)   SpO2 100%   BMI 26.70 kg/m   Estimated body mass index is 26.7 kg/m  as calculated from the following:    Height as of 12/16/21: 1.727 m (5' 8\").    Weight as of this encounter: 79.7 kg (175 lb 9.6 oz).  Medication Reconciliation: complete  Elza Crespo LPN  "

## 2022-01-18 ENCOUNTER — HOSPITAL ENCOUNTER (OUTPATIENT)
Dept: ULTRASOUND IMAGING | Facility: HOSPITAL | Age: 37
Discharge: HOME | End: 2022-01-18
Attending: FAMILY MEDICINE | Admitting: FAMILY MEDICINE
Payer: COMMERCIAL

## 2022-01-18 LAB
HBV SURFACE AG SERPL QL IA: NONREACTIVE
HCV AB SERPL QL IA: NONREACTIVE
HIV 1+2 AB+HIV1 P24 AG SERPL QL IA: NONREACTIVE
RUBV IGG SERPL QL IA: 0.55 INDEX
RUBV IGG SERPL QL IA: NORMAL
T PALLIDUM AB SER QL: NONREACTIVE

## 2022-01-18 PROCEDURE — 76805 OB US >/= 14 WKS SNGL FETUS: CPT

## 2022-01-19 LAB — BACTERIA UR CULT: NO GROWTH

## 2022-02-07 DIAGNOSIS — R79.89 LOW SERUM PROGESTERONE: ICD-10-CM

## 2022-02-09 NOTE — TELEPHONE ENCOUNTER
PROGESTERONE      Last Written Prescription Date:  12-7-2021  Last Fill Quantity: 60,   # refills: 1  Last Office Visit: 1-  Future Office visit:    Next 5 appointments (look out 90 days)    Feb 14, 2022  9:15 AM  (Arrive by 9:00 AM)  [facility] [place] [facility] [place] [facility] [address]  [place] [address]  [phone]   Mar 14, 2022 10:45 AM  (Arrive by 10:30 AM)  [facility] [place] [facility] [place] [facility] [address]  [place] [address]  [phone]   Mar 28, 2022 10:15 AM  (Arrive by 10:00 AM)  [facility] [place] [facility] [place] [facility] [address]  [place] [address]  [phone]   Apr 11, 2022  8:30 AM  (Arrive by 8:15 AM)  [facility] [place] [facility] [place] [facility] [address]  [place] [address]  [phone]   Apr 25, 2022  8:30 AM  (Arrive by 8:15 AM)  ESTABLISHED PRENATAL with Ludy Reza MD  [facility] [place] ([facility] [place] ) [address]  [place] [address]  [phone]           Routing refill request to provider for review/approval because:

## 2022-02-10 RX ORDER — PROGESTERONE 100 MG/1
CAPSULE ORAL
Qty: 60 CAPSULE | Refills: 1 | Status: SHIPPED | OUTPATIENT
Start: 2022-02-10 | End: 2022-03-16

## 2022-02-14 ENCOUNTER — PRENATAL OFFICE VISIT (OUTPATIENT)
Dept: FAMILY MEDICINE | Facility: OTHER | Age: 37
End: 2022-02-14
Attending: FAMILY MEDICINE
Payer: COMMERCIAL

## 2022-02-14 ENCOUNTER — LAB (OUTPATIENT)
Dept: LAB | Facility: OTHER | Age: 37
End: 2022-02-14
Attending: FAMILY MEDICINE
Payer: COMMERCIAL

## 2022-02-14 VITALS
TEMPERATURE: 96.7 F | HEART RATE: 67 BPM | SYSTOLIC BLOOD PRESSURE: 118 MMHG | DIASTOLIC BLOOD PRESSURE: 78 MMHG | OXYGEN SATURATION: 100 % | BODY MASS INDEX: 26.82 KG/M2 | WEIGHT: 176.4 LBS

## 2022-02-14 DIAGNOSIS — R79.89 LOW SERUM PROGESTERONE: ICD-10-CM

## 2022-02-14 DIAGNOSIS — R73.09 ELEVATED GLUCOSE: Primary | ICD-10-CM

## 2022-02-14 DIAGNOSIS — O09.521 MULTIGRAVIDA OF ADVANCED MATERNAL AGE IN FIRST TRIMESTER: ICD-10-CM

## 2022-02-14 DIAGNOSIS — O09.892 SUPERVISION OF OTHER HIGH RISK PREGNANCIES, SECOND TRIMESTER: ICD-10-CM

## 2022-02-14 LAB
AMPHETAMINES UR QL: NOT DETECTED
BARBITURATES UR QL SCN: NOT DETECTED
BENZODIAZ UR QL SCN: NOT DETECTED
BUPRENORPHINE UR QL: NOT DETECTED
CANNABINOIDS UR QL: NOT DETECTED
COCAINE UR QL SCN: NOT DETECTED
D-METHAMPHET UR QL: NOT DETECTED
ERYTHROCYTE [DISTWIDTH] IN BLOOD BY AUTOMATED COUNT: 13.1 % (ref 10–15)
GLUCOSE 1H P 50 G GLC PO SERPL-MCNC: 140 MG/DL (ref 70–129)
HCT VFR BLD AUTO: 37.9 % (ref 35–47)
HGB BLD-MCNC: 12.9 G/DL (ref 11.7–15.7)
MCH RBC QN AUTO: 30.6 PG (ref 26.5–33)
MCHC RBC AUTO-ENTMCNC: 34 G/DL (ref 31.5–36.5)
MCV RBC AUTO: 90 FL (ref 78–100)
METHADONE UR QL SCN: NOT DETECTED
OPIATES UR QL SCN: NOT DETECTED
OXYCODONE UR QL SCN: NOT DETECTED
PCP UR QL SCN: NOT DETECTED
PLATELET # BLD AUTO: 188 10E3/UL (ref 150–450)
PROGEST SERPL-MCNC: 76.3 NG/ML
PROPOXYPH UR QL: NOT DETECTED
RBC # BLD AUTO: 4.21 10E6/UL (ref 3.8–5.2)
TRICYCLICS UR QL SCN: NOT DETECTED
WBC # BLD AUTO: 5.4 10E3/UL (ref 4–11)

## 2022-02-14 PROCEDURE — 80306 DRUG TEST PRSMV INSTRMNT: CPT

## 2022-02-14 PROCEDURE — 84144 ASSAY OF PROGESTERONE: CPT

## 2022-02-14 PROCEDURE — 85027 COMPLETE CBC AUTOMATED: CPT | Performed by: FAMILY MEDICINE

## 2022-02-14 PROCEDURE — 36415 COLL VENOUS BLD VENIPUNCTURE: CPT

## 2022-02-14 PROCEDURE — 99207 PR COMPLICATED OB VISIT: CPT | Performed by: FAMILY MEDICINE

## 2022-02-14 PROCEDURE — 82950 GLUCOSE TEST: CPT | Performed by: FAMILY MEDICINE

## 2022-02-14 ASSESSMENT — PAIN SCALES - GENERAL: PAINLEVEL: NO PAIN (0)

## 2022-02-14 NOTE — NURSING NOTE
"Chief Complaint   Patient presents with     Prenatal Care       Initial /78 (BP Location: Left arm)   Pulse 67   Temp (!) 96.7  F (35.9  C) (Tympanic)   Wt 80 kg (176 lb 6.4 oz)   LMP 08/30/2021 (Approximate)   SpO2 100%   BMI 26.82 kg/m   Estimated body mass index is 26.82 kg/m  as calculated from the following:    Height as of 12/16/21: 1.727 m (5' 8\").    Weight as of this encounter: 80 kg (176 lb 6.4 oz).  Medication Reconciliation: complete  Keyonna Crespo LPN  "

## 2022-02-14 NOTE — PROGRESS NOTES
Good, feeling fine  gct and cbc today  Hadn't gotten drug screen yet  Stressors at work  No contractions

## 2022-02-16 ENCOUNTER — DOCUMENTATION ONLY (OUTPATIENT)
Dept: FAMILY MEDICINE | Facility: OTHER | Age: 37
End: 2022-02-16
Payer: COMMERCIAL

## 2022-02-16 NOTE — PROGRESS NOTES
Called pt, Gave instructions for 3 hour gtt. Pt will follow the 150 gms carbohydrate diet for 3 days prior to test. Instructed pt to fast 10-12 hours prior to test. Scheduled for 2/21/22 at 0800.

## 2022-02-21 ENCOUNTER — LAB (OUTPATIENT)
Dept: LAB | Facility: OTHER | Age: 37
End: 2022-02-21
Payer: COMMERCIAL

## 2022-02-21 DIAGNOSIS — R73.09 ELEVATED GLUCOSE: ICD-10-CM

## 2022-02-21 LAB
GESTATIONAL GTT 1 HR POST DOSE: 151 MG/DL (ref 60–179)
GESTATIONAL GTT 2 HR POST DOSE: 127 MG/DL (ref 60–154)
GESTATIONAL GTT 3 HR POST DOSE: 116 MG/DL (ref 60–139)
GLUCOSE P FAST SERPL-MCNC: 97 MG/DL (ref 60–94)

## 2022-02-21 PROCEDURE — 36415 COLL VENOUS BLD VENIPUNCTURE: CPT

## 2022-02-21 PROCEDURE — 82951 GLUCOSE TOLERANCE TEST (GTT): CPT

## 2022-02-21 PROCEDURE — 82952 GTT-ADDED SAMPLES: CPT

## 2022-03-16 ENCOUNTER — PRENATAL OFFICE VISIT (OUTPATIENT)
Dept: FAMILY MEDICINE | Facility: OTHER | Age: 37
End: 2022-03-16
Attending: FAMILY MEDICINE
Payer: COMMERCIAL

## 2022-03-16 VITALS
BODY MASS INDEX: 27.4 KG/M2 | SYSTOLIC BLOOD PRESSURE: 115 MMHG | DIASTOLIC BLOOD PRESSURE: 68 MMHG | WEIGHT: 180.8 LBS | HEIGHT: 68 IN | TEMPERATURE: 97 F | OXYGEN SATURATION: 100 % | HEART RATE: 69 BPM

## 2022-03-16 DIAGNOSIS — Z23 NEED FOR VACCINATION: ICD-10-CM

## 2022-03-16 DIAGNOSIS — Z32.01 PREGNANCY TEST POSITIVE: ICD-10-CM

## 2022-03-16 DIAGNOSIS — R79.89 LOW SERUM PROGESTERONE: ICD-10-CM

## 2022-03-16 DIAGNOSIS — O09.893 SUPERVISION OF OTHER HIGH RISK PREGNANCIES, THIRD TRIMESTER: Primary | ICD-10-CM

## 2022-03-16 DIAGNOSIS — O09.523 MULTIGRAVIDA OF ADVANCED MATERNAL AGE IN THIRD TRIMESTER: ICD-10-CM

## 2022-03-16 LAB
ANTIBODY SCREEN: NEGATIVE
PROGEST SERPL-MCNC: 107 NG/ML
SPECIMEN EXPIRATION DATE: NORMAL

## 2022-03-16 PROCEDURE — 36415 COLL VENOUS BLD VENIPUNCTURE: CPT | Performed by: FAMILY MEDICINE

## 2022-03-16 PROCEDURE — 90471 IMMUNIZATION ADMIN: CPT | Performed by: FAMILY MEDICINE

## 2022-03-16 PROCEDURE — 84144 ASSAY OF PROGESTERONE: CPT | Performed by: FAMILY MEDICINE

## 2022-03-16 PROCEDURE — 90715 TDAP VACCINE 7 YRS/> IM: CPT | Performed by: FAMILY MEDICINE

## 2022-03-16 PROCEDURE — 99207 PR COMPLICATED OB VISIT: CPT | Performed by: FAMILY MEDICINE

## 2022-03-16 PROCEDURE — 86850 RBC ANTIBODY SCREEN: CPT | Performed by: FAMILY MEDICINE

## 2022-03-16 RX ORDER — PROGESTERONE 100 MG/1
200 CAPSULE ORAL DAILY
Qty: 60 CAPSULE | Refills: 1 | Status: SHIPPED | OUTPATIENT
Start: 2022-03-16 | End: 2022-04-11

## 2022-03-16 RX ORDER — PRENATAL VIT/IRON FUM/FOLIC AC 27MG-0.8MG
1 TABLET ORAL DAILY
Qty: 90 TABLET | Refills: 3 | Status: SHIPPED | OUTPATIENT
Start: 2022-03-16 | End: 2022-06-13

## 2022-03-16 ASSESSMENT — PAIN SCALES - GENERAL: PAINLEVEL: NO PAIN (0)

## 2022-03-16 NOTE — NURSING NOTE
"Chief Complaint   Patient presents with     Prenatal Care       Initial /68 (BP Location: Left arm, Patient Position: Sitting)   Pulse 69   Temp 97  F (36.1  C) (Tympanic)   Ht 1.727 m (5' 8\")   Wt 82 kg (180 lb 12.8 oz)   LMP 08/30/2021 (Approximate)   SpO2 100%   BMI 27.49 kg/m   Estimated body mass index is 27.49 kg/m  as calculated from the following:    Height as of this encounter: 1.727 m (5' 8\").    Weight as of this encounter: 82 kg (180 lb 12.8 oz).  Medication Reconciliation: complete  Keyonna Crespo LPN  "

## 2022-03-17 NOTE — PROGRESS NOTES
Doing well,  tdap today and antibody screen  Education done  Thinking about declining vaccines in hospital and doing modified schedule  Questions answered today  Progesterone done

## 2022-03-28 ENCOUNTER — PRENATAL OFFICE VISIT (OUTPATIENT)
Dept: FAMILY MEDICINE | Facility: OTHER | Age: 37
End: 2022-03-28
Attending: FAMILY MEDICINE
Payer: COMMERCIAL

## 2022-03-28 VITALS
TEMPERATURE: 98 F | HEART RATE: 72 BPM | OXYGEN SATURATION: 99 % | DIASTOLIC BLOOD PRESSURE: 64 MMHG | WEIGHT: 181.8 LBS | SYSTOLIC BLOOD PRESSURE: 118 MMHG | HEIGHT: 68 IN | BODY MASS INDEX: 27.55 KG/M2

## 2022-03-28 DIAGNOSIS — R79.89 LOW SERUM PROGESTERONE: ICD-10-CM

## 2022-03-28 DIAGNOSIS — O09.893 SUPERVISION OF OTHER HIGH RISK PREGNANCIES, THIRD TRIMESTER: ICD-10-CM

## 2022-03-28 DIAGNOSIS — O09.523 MULTIGRAVIDA OF ADVANCED MATERNAL AGE IN THIRD TRIMESTER: ICD-10-CM

## 2022-03-28 LAB
ERYTHROCYTE [DISTWIDTH] IN BLOOD BY AUTOMATED COUNT: 13.2 % (ref 10–15)
HCT VFR BLD AUTO: 38.2 % (ref 35–47)
HGB BLD-MCNC: 13 G/DL (ref 11.7–15.7)
MCH RBC QN AUTO: 30.7 PG (ref 26.5–33)
MCHC RBC AUTO-ENTMCNC: 34 G/DL (ref 31.5–36.5)
MCV RBC AUTO: 90 FL (ref 78–100)
PLATELET # BLD AUTO: 203 10E3/UL (ref 150–450)
RBC # BLD AUTO: 4.23 10E6/UL (ref 3.8–5.2)
WBC # BLD AUTO: 7.9 10E3/UL (ref 4–11)

## 2022-03-28 PROCEDURE — 84144 ASSAY OF PROGESTERONE: CPT | Performed by: FAMILY MEDICINE

## 2022-03-28 PROCEDURE — 85027 COMPLETE CBC AUTOMATED: CPT | Performed by: FAMILY MEDICINE

## 2022-03-28 PROCEDURE — 99207 PR COMPLICATED OB VISIT: CPT | Performed by: FAMILY MEDICINE

## 2022-03-28 PROCEDURE — 36415 COLL VENOUS BLD VENIPUNCTURE: CPT | Performed by: FAMILY MEDICINE

## 2022-03-28 ASSESSMENT — PAIN SCALES - GENERAL: PAINLEVEL: NO PAIN (0)

## 2022-03-28 NOTE — NURSING NOTE
"Chief Complaint   Patient presents with     Prenatal Care       Initial /64 (BP Location: Right arm, Patient Position: Sitting, Cuff Size: Adult Regular)   Pulse 72   Temp 98  F (36.7  C) (Tympanic)   Ht 1.727 m (5' 8\")   Wt 82.5 kg (181 lb 12.8 oz)   LMP 08/30/2021 (Approximate)   SpO2 99%   BMI 27.64 kg/m   Estimated body mass index is 27.64 kg/m  as calculated from the following:    Height as of this encounter: 1.727 m (5' 8\").    Weight as of this encounter: 82.5 kg (181 lb 12.8 oz).  Medication Reconciliation: complete  Keyonna Crespo LPN  "

## 2022-03-28 NOTE — PROGRESS NOTES
B-H but no pelvic pressure  Education done  Progesterone today -- stopped after last appt  Stop low dose naltrexone

## 2022-03-29 LAB — PROGEST SERPL-MCNC: 117.6 NG/ML

## 2022-04-11 ENCOUNTER — PRENATAL OFFICE VISIT (OUTPATIENT)
Dept: FAMILY MEDICINE | Facility: OTHER | Age: 37
End: 2022-04-11
Attending: FAMILY MEDICINE
Payer: COMMERCIAL

## 2022-04-11 VITALS
OXYGEN SATURATION: 100 % | BODY MASS INDEX: 28.46 KG/M2 | TEMPERATURE: 97 F | HEIGHT: 68 IN | WEIGHT: 187.8 LBS | HEART RATE: 84 BPM | DIASTOLIC BLOOD PRESSURE: 70 MMHG | SYSTOLIC BLOOD PRESSURE: 108 MMHG

## 2022-04-11 DIAGNOSIS — O09.893 SUPERVISION OF OTHER HIGH RISK PREGNANCIES, THIRD TRIMESTER: ICD-10-CM

## 2022-04-11 DIAGNOSIS — R79.89 LOW SERUM PROGESTERONE: ICD-10-CM

## 2022-04-11 DIAGNOSIS — O09.523 MULTIGRAVIDA OF ADVANCED MATERNAL AGE IN THIRD TRIMESTER: Primary | ICD-10-CM

## 2022-04-11 DIAGNOSIS — Z71.89 ACP (ADVANCE CARE PLANNING): ICD-10-CM

## 2022-04-11 PROCEDURE — 99207 PR COMPLICATED OB VISIT: CPT | Performed by: FAMILY MEDICINE

## 2022-04-11 ASSESSMENT — PAIN SCALES - GENERAL: PAINLEVEL: NO PAIN (0)

## 2022-04-11 NOTE — PROGRESS NOTES
Some B-H  Done with progesterone and LDN  Doing well, education done  Cbc   She is hoping for may baby

## 2022-04-11 NOTE — NURSING NOTE
"Chief Complaint   Patient presents with     Prenatal Care       Initial /70 (BP Location: Left arm, Patient Position: Sitting, Cuff Size: Adult Regular)   Pulse 84   Temp 97  F (36.1  C) (Tympanic)   Ht 1.727 m (5' 8\")   Wt 85.2 kg (187 lb 12.8 oz)   LMP 08/30/2021 (Approximate)   SpO2 100%   BMI 28.55 kg/m   Estimated body mass index is 28.55 kg/m  as calculated from the following:    Height as of this encounter: 1.727 m (5' 8\").    Weight as of this encounter: 85.2 kg (187 lb 12.8 oz).  Medication Reconciliation: complete  Keyonna Crespo LPN  "

## 2022-04-27 ENCOUNTER — PRENATAL OFFICE VISIT (OUTPATIENT)
Dept: FAMILY MEDICINE | Facility: OTHER | Age: 37
End: 2022-04-27
Attending: FAMILY MEDICINE
Payer: COMMERCIAL

## 2022-04-27 VITALS
OXYGEN SATURATION: 99 % | SYSTOLIC BLOOD PRESSURE: 112 MMHG | HEART RATE: 79 BPM | BODY MASS INDEX: 28.01 KG/M2 | HEIGHT: 68 IN | DIASTOLIC BLOOD PRESSURE: 68 MMHG | TEMPERATURE: 97.4 F | WEIGHT: 184.8 LBS

## 2022-04-27 DIAGNOSIS — O09.523 MULTIGRAVIDA OF ADVANCED MATERNAL AGE IN THIRD TRIMESTER: ICD-10-CM

## 2022-04-27 DIAGNOSIS — O09.893 SUPERVISION OF OTHER HIGH RISK PREGNANCIES, THIRD TRIMESTER: Primary | ICD-10-CM

## 2022-04-27 DIAGNOSIS — O36.63X0 EXCESSIVE FETAL GROWTH AFFECTING MANAGEMENT OF PREGNANCY IN THIRD TRIMESTER, SINGLE OR UNSPECIFIED FETUS: ICD-10-CM

## 2022-04-27 PROCEDURE — 99207 PR COMPLICATED OB VISIT: CPT | Performed by: FAMILY MEDICINE

## 2022-04-27 ASSESSMENT — PAIN SCALES - GENERAL: PAINLEVEL: NO PAIN (0)

## 2022-04-27 NOTE — PROGRESS NOTES
"B-H, no other contractions  Off progesterone  Measuring LGA today, last infant 21\"  Discussed possible induction May 27th  Education done  Otherwise feeling well  "

## 2022-04-27 NOTE — NURSING NOTE
"Chief Complaint   Patient presents with     Prenatal Care       Initial /68 (BP Location: Right arm, Patient Position: Sitting, Cuff Size: Adult Regular)   Pulse 79   Temp 97.4  F (36.3  C) (Tympanic)   Ht 1.727 m (5' 8\")   Wt 83.8 kg (184 lb 12.8 oz)   LMP 08/30/2021 (Approximate)   SpO2 99%   BMI 28.10 kg/m   Estimated body mass index is 28.1 kg/m  as calculated from the following:    Height as of this encounter: 1.727 m (5' 8\").    Weight as of this encounter: 83.8 kg (184 lb 12.8 oz).  Medication Reconciliation: complete  Keyonna Crespo LPN  "

## 2022-05-04 ENCOUNTER — HOSPITAL ENCOUNTER (OUTPATIENT)
Dept: ULTRASOUND IMAGING | Facility: HOSPITAL | Age: 37
Discharge: HOME | End: 2022-05-04
Attending: FAMILY MEDICINE | Admitting: FAMILY MEDICINE
Payer: COMMERCIAL

## 2022-05-04 DIAGNOSIS — O36.63X0 EXCESSIVE FETAL GROWTH AFFECTING MANAGEMENT OF PREGNANCY IN THIRD TRIMESTER, SINGLE OR UNSPECIFIED FETUS: ICD-10-CM

## 2022-05-04 PROCEDURE — 76816 OB US FOLLOW-UP PER FETUS: CPT

## 2022-05-09 ENCOUNTER — PRENATAL OFFICE VISIT (OUTPATIENT)
Dept: FAMILY MEDICINE | Facility: OTHER | Age: 37
End: 2022-05-09
Attending: FAMILY MEDICINE
Payer: COMMERCIAL

## 2022-05-09 VITALS
SYSTOLIC BLOOD PRESSURE: 110 MMHG | BODY MASS INDEX: 27.92 KG/M2 | DIASTOLIC BLOOD PRESSURE: 70 MMHG | HEART RATE: 67 BPM | HEIGHT: 68 IN | WEIGHT: 184.2 LBS | TEMPERATURE: 97 F | OXYGEN SATURATION: 100 %

## 2022-05-09 DIAGNOSIS — O09.523 MULTIGRAVIDA OF ADVANCED MATERNAL AGE IN THIRD TRIMESTER: Primary | ICD-10-CM

## 2022-05-09 DIAGNOSIS — O09.893 SUPERVISION OF OTHER HIGH RISK PREGNANCIES, THIRD TRIMESTER: ICD-10-CM

## 2022-05-09 PROCEDURE — 87653 STREP B DNA AMP PROBE: CPT | Performed by: FAMILY MEDICINE

## 2022-05-09 PROCEDURE — 99207 PR COMPLICATED OB VISIT: CPT | Performed by: FAMILY MEDICINE

## 2022-05-09 ASSESSMENT — PAIN SCALES - GENERAL: PAINLEVEL: NO PAIN (0)

## 2022-05-09 NOTE — NURSING NOTE
"Chief Complaint   Patient presents with     Prenatal Care       Initial /70 (BP Location: Left arm, Patient Position: Sitting, Cuff Size: Adult Regular)   Pulse 67   Temp 97  F (36.1  C) (Tympanic)   Ht 1.727 m (5' 8\")   Wt 83.6 kg (184 lb 3.2 oz)   LMP 08/30/2021 (Approximate)   SpO2 100%   BMI 28.01 kg/m   Estimated body mass index is 28.01 kg/m  as calculated from the following:    Height as of this encounter: 1.727 m (5' 8\").    Weight as of this encounter: 83.6 kg (184 lb 3.2 oz).  Medication Reconciliation: complete  Keyonna Crespo LPN  "

## 2022-05-09 NOTE — PROGRESS NOTES
+mvmt, education done  GBS done today  Discussed induction, NSTs and BPPs  Watch carbs, processed food  US showed 65% for weight

## 2022-05-10 LAB — GP B STREP DNA SPEC QL NAA+PROBE: NEGATIVE

## 2022-05-17 ENCOUNTER — HOSPITAL ENCOUNTER (OUTPATIENT)
Dept: ULTRASOUND IMAGING | Facility: HOSPITAL | Age: 37
Discharge: HOME | End: 2022-05-17
Attending: FAMILY MEDICINE | Admitting: FAMILY MEDICINE
Payer: COMMERCIAL

## 2022-05-17 ENCOUNTER — HOSPITAL ENCOUNTER (OUTPATIENT)
Facility: HOSPITAL | Age: 37
Discharge: HOME | End: 2022-05-17
Attending: FAMILY MEDICINE | Admitting: FAMILY MEDICINE
Payer: COMMERCIAL

## 2022-05-17 VITALS
HEIGHT: 68 IN | OXYGEN SATURATION: 99 % | BODY MASS INDEX: 28.34 KG/M2 | DIASTOLIC BLOOD PRESSURE: 68 MMHG | SYSTOLIC BLOOD PRESSURE: 114 MMHG | HEART RATE: 75 BPM | RESPIRATION RATE: 18 BRPM | TEMPERATURE: 98 F | WEIGHT: 187 LBS

## 2022-05-17 DIAGNOSIS — O09.523 MULTIGRAVIDA OF ADVANCED MATERNAL AGE IN THIRD TRIMESTER: ICD-10-CM

## 2022-05-17 DIAGNOSIS — O09.893 SUPERVISION OF OTHER HIGH RISK PREGNANCIES, THIRD TRIMESTER: ICD-10-CM

## 2022-05-17 PROCEDURE — 59025 FETAL NON-STRESS TEST: CPT

## 2022-05-17 PROCEDURE — 59025 FETAL NON-STRESS TEST: CPT | Mod: 26 | Performed by: FAMILY MEDICINE

## 2022-05-17 PROCEDURE — 76819 FETAL BIOPHYS PROFIL W/O NST: CPT

## 2022-05-17 NOTE — DISCHARGE INSTRUCTIONS

## 2022-05-17 NOTE — PLAN OF CARE
Viki Busch        NST:  reactive  Start:1415  Stop:1440    Physician: Dr. Reza  Reason For Test: AMA  EDC:06/03/2022  Gestational Age: 37w 4d    Comments:        Nicole Ramirez RN

## 2022-05-17 NOTE — CARE PLAN
Patient arrived at 1405 for NST. Pt denies bleeding, cramping, or leaking of fluids. Monitors applied, call light in reach. Will continue to monitor. VS stable.

## 2022-05-18 ENCOUNTER — PRENATAL OFFICE VISIT (OUTPATIENT)
Dept: FAMILY MEDICINE | Facility: OTHER | Age: 37
End: 2022-05-18
Attending: FAMILY MEDICINE
Payer: COMMERCIAL

## 2022-05-18 VITALS
HEART RATE: 73 BPM | TEMPERATURE: 98.7 F | BODY MASS INDEX: 28.59 KG/M2 | WEIGHT: 188 LBS | OXYGEN SATURATION: 99 % | SYSTOLIC BLOOD PRESSURE: 100 MMHG | RESPIRATION RATE: 18 BRPM | DIASTOLIC BLOOD PRESSURE: 70 MMHG

## 2022-05-18 DIAGNOSIS — R79.89 LOW SERUM PROGESTERONE: ICD-10-CM

## 2022-05-18 DIAGNOSIS — O09.893 SUPERVISION OF OTHER HIGH RISK PREGNANCIES, THIRD TRIMESTER: Primary | ICD-10-CM

## 2022-05-18 PROCEDURE — 99207 PR COMPLICATED OB VISIT: CPT | Performed by: FAMILY MEDICINE

## 2022-05-18 ASSESSMENT — PAIN SCALES - GENERAL: PAINLEVEL: NO PAIN (0)

## 2022-05-18 NOTE — PROGRESS NOTES
BPP 8/8 and NST 2/2 -- very good  Working on hydration, cut back/out caffeine   Bloody show on cervical check today  Induction set for next week

## 2022-05-21 ENCOUNTER — HEALTH MAINTENANCE LETTER (OUTPATIENT)
Age: 37
End: 2022-05-21

## 2022-05-23 ENCOUNTER — PRENATAL OFFICE VISIT (OUTPATIENT)
Dept: FAMILY MEDICINE | Facility: OTHER | Age: 37
End: 2022-05-23
Attending: FAMILY MEDICINE
Payer: COMMERCIAL

## 2022-05-23 VITALS
HEIGHT: 68 IN | TEMPERATURE: 97.8 F | OXYGEN SATURATION: 100 % | SYSTOLIC BLOOD PRESSURE: 112 MMHG | DIASTOLIC BLOOD PRESSURE: 68 MMHG | BODY MASS INDEX: 28.92 KG/M2 | HEART RATE: 91 BPM | WEIGHT: 190.8 LBS

## 2022-05-23 DIAGNOSIS — O09.893 SUPERVISION OF OTHER HIGH RISK PREGNANCIES, THIRD TRIMESTER: ICD-10-CM

## 2022-05-23 DIAGNOSIS — O09.523 MULTIGRAVIDA OF ADVANCED MATERNAL AGE IN THIRD TRIMESTER: Primary | ICD-10-CM

## 2022-05-23 PROCEDURE — 99207 PR COMPLICATED OB VISIT: CPT | Performed by: FAMILY MEDICINE

## 2022-05-23 ASSESSMENT — PAIN SCALES - GENERAL: PAINLEVEL: NO PAIN (0)

## 2022-05-23 NOTE — NURSING NOTE
"Chief Complaint   Patient presents with     Prenatal Care       Initial /68 (BP Location: Right arm, Patient Position: Sitting, Cuff Size: Adult Regular)   Pulse 91   Temp 97.8  F (36.6  C) (Tympanic)   Ht 1.727 m (5' 8\")   Wt 86.5 kg (190 lb 12.8 oz)   LMP 08/30/2021 (Approximate)   SpO2 100%   BMI 29.01 kg/m   Estimated body mass index is 29.01 kg/m  as calculated from the following:    Height as of this encounter: 1.727 m (5' 8\").    Weight as of this encounter: 86.5 kg (190 lb 12.8 oz).  Medication Reconciliation: complete  Keyonna Crespo LPN  "

## 2022-05-24 ENCOUNTER — HOSPITAL ENCOUNTER (OUTPATIENT)
Facility: HOSPITAL | Age: 37
Discharge: HOME | End: 2022-05-24
Attending: FAMILY MEDICINE | Admitting: FAMILY MEDICINE
Payer: COMMERCIAL

## 2022-05-24 ENCOUNTER — HOSPITAL ENCOUNTER (OUTPATIENT)
Dept: ULTRASOUND IMAGING | Facility: HOSPITAL | Age: 37
Discharge: HOME | End: 2022-05-24
Attending: FAMILY MEDICINE | Admitting: FAMILY MEDICINE
Payer: COMMERCIAL

## 2022-05-24 VITALS
OXYGEN SATURATION: 98 % | TEMPERATURE: 98.2 F | DIASTOLIC BLOOD PRESSURE: 86 MMHG | RESPIRATION RATE: 18 BRPM | SYSTOLIC BLOOD PRESSURE: 106 MMHG

## 2022-05-24 DIAGNOSIS — O09.523 MULTIGRAVIDA OF ADVANCED MATERNAL AGE IN THIRD TRIMESTER: ICD-10-CM

## 2022-05-24 DIAGNOSIS — O09.893 SUPERVISION OF OTHER HIGH RISK PREGNANCIES, THIRD TRIMESTER: ICD-10-CM

## 2022-05-24 PROCEDURE — 59025 FETAL NON-STRESS TEST: CPT | Mod: 59

## 2022-05-24 PROCEDURE — 76819 FETAL BIOPHYS PROFIL W/O NST: CPT

## 2022-05-24 NOTE — DISCHARGE INSTRUCTIONS

## 2022-05-24 NOTE — PROGRESS NOTES
Doing well, cervix is very soft, stretchy, induction for Friday if nothing beforehand  Had some regular contractions the other night but resolved, drinking lots of water  Baby has dropped  Education done

## 2022-05-24 NOTE — PLAN OF CARE
Viki Busch        NST:  reactive  Start: 0725  Stop: 0751    Physician: Dr. Reza  Reason For Test: Advanced Maternal Age  Estimated Date of Delivery: Jim 3, 2022   Gestational Age: 38w4d     Verified with second RN: Paulina MORALES     Comments:  Baby active, VSS, AVS reviewed and signed      Eloisa Reyes RN

## 2022-05-27 ENCOUNTER — ANESTHESIA EVENT (OUTPATIENT)
Dept: OBGYN | Facility: HOSPITAL | Age: 37
End: 2022-05-27
Payer: COMMERCIAL

## 2022-05-27 ENCOUNTER — ANESTHESIA (OUTPATIENT)
Dept: OBGYN | Facility: HOSPITAL | Age: 37
End: 2022-05-27
Payer: COMMERCIAL

## 2022-05-27 ENCOUNTER — HOSPITAL ENCOUNTER (INPATIENT)
Facility: HOSPITAL | Age: 37
LOS: 1 days | Discharge: HOME | End: 2022-05-28
Attending: FAMILY MEDICINE | Admitting: FAMILY MEDICINE
Payer: COMMERCIAL

## 2022-05-27 PROBLEM — Z34.90 ENCOUNTER FOR INDUCTION OF LABOR: Status: ACTIVE | Noted: 2022-05-27

## 2022-05-27 LAB
ABO/RH(D): NORMAL
ANTIBODY SCREEN: NEGATIVE
ERYTHROCYTE [DISTWIDTH] IN BLOOD BY AUTOMATED COUNT: 13.6 % (ref 10–15)
HCT VFR BLD AUTO: 38.8 % (ref 35–47)
HGB BLD-MCNC: 13.2 G/DL (ref 11.7–15.7)
HOLD SPECIMEN: NORMAL
MCH RBC QN AUTO: 31 PG (ref 26.5–33)
MCHC RBC AUTO-ENTMCNC: 34 G/DL (ref 31.5–36.5)
MCV RBC AUTO: 91 FL (ref 78–100)
PLATELET # BLD AUTO: 177 10E3/UL (ref 150–450)
RBC # BLD AUTO: 4.26 10E6/UL (ref 3.8–5.2)
SARS-COV-2 RNA RESP QL NAA+PROBE: NEGATIVE
SPECIMEN EXPIRATION DATE: NORMAL
WBC # BLD AUTO: 5.3 10E3/UL (ref 4–11)

## 2022-05-27 PROCEDURE — U0005 INFEC AGEN DETEC AMPLI PROBE: HCPCS | Performed by: FAMILY MEDICINE

## 2022-05-27 PROCEDURE — 250N000009 HC RX 250: Performed by: FAMILY MEDICINE

## 2022-05-27 PROCEDURE — 85027 COMPLETE CBC AUTOMATED: CPT | Performed by: FAMILY MEDICINE

## 2022-05-27 PROCEDURE — 86901 BLOOD TYPING SEROLOGIC RH(D): CPT | Performed by: FAMILY MEDICINE

## 2022-05-27 PROCEDURE — 36415 COLL VENOUS BLD VENIPUNCTURE: CPT | Performed by: FAMILY MEDICINE

## 2022-05-27 PROCEDURE — 258N000003 HC RX IP 258 OP 636: Performed by: FAMILY MEDICINE

## 2022-05-27 PROCEDURE — 250N000013 HC RX MED GY IP 250 OP 250 PS 637: Performed by: FAMILY MEDICINE

## 2022-05-27 PROCEDURE — 250N000011 HC RX IP 250 OP 636: Performed by: NURSE ANESTHETIST, CERTIFIED REGISTERED

## 2022-05-27 PROCEDURE — 86780 TREPONEMA PALLIDUM: CPT | Performed by: FAMILY MEDICINE

## 2022-05-27 PROCEDURE — 722N000001 HC LABOR CARE VAGINAL DELIVERY SINGLE

## 2022-05-27 PROCEDURE — 59400 OBSTETRICAL CARE: CPT | Performed by: NURSE ANESTHETIST, CERTIFIED REGISTERED

## 2022-05-27 PROCEDURE — 120N000001 HC R&B MED SURG/OB

## 2022-05-27 PROCEDURE — 59400 OBSTETRICAL CARE: CPT | Performed by: FAMILY MEDICINE

## 2022-05-27 RX ORDER — FENTANYL CITRATE 50 UG/ML
100 INJECTION, SOLUTION INTRAMUSCULAR; INTRAVENOUS ONCE
Status: DISCONTINUED | OUTPATIENT
Start: 2022-05-27 | End: 2022-05-27

## 2022-05-27 RX ORDER — METOCLOPRAMIDE 10 MG/1
10 TABLET ORAL EVERY 6 HOURS PRN
Status: DISCONTINUED | OUTPATIENT
Start: 2022-05-27 | End: 2022-05-27

## 2022-05-27 RX ORDER — OXYTOCIN 10 [USP'U]/ML
10 INJECTION, SOLUTION INTRAMUSCULAR; INTRAVENOUS
Status: DISCONTINUED | OUTPATIENT
Start: 2022-05-27 | End: 2022-05-27

## 2022-05-27 RX ORDER — ONDANSETRON 2 MG/ML
4 INJECTION INTRAMUSCULAR; INTRAVENOUS EVERY 4 HOURS PRN
Status: DISCONTINUED | OUTPATIENT
Start: 2022-05-27 | End: 2022-05-27

## 2022-05-27 RX ORDER — LIDOCAINE HYDROCHLORIDE AND EPINEPHRINE 15; 5 MG/ML; UG/ML
3 INJECTION, SOLUTION EPIDURAL
Status: DISCONTINUED | OUTPATIENT
Start: 2022-05-27 | End: 2022-05-27

## 2022-05-27 RX ORDER — CITRIC ACID/SODIUM CITRATE 334-500MG
30 SOLUTION, ORAL ORAL
Status: DISCONTINUED | OUTPATIENT
Start: 2022-05-27 | End: 2022-05-27

## 2022-05-27 RX ORDER — OXYTOCIN/0.9 % SODIUM CHLORIDE 30/500 ML
340 PLASTIC BAG, INJECTION (ML) INTRAVENOUS CONTINUOUS PRN
Status: DISCONTINUED | OUTPATIENT
Start: 2022-05-27 | End: 2022-05-28

## 2022-05-27 RX ORDER — METHYLERGONOVINE MALEATE 0.2 MG/ML
200 INJECTION INTRAVENOUS
Status: DISCONTINUED | OUTPATIENT
Start: 2022-05-27 | End: 2022-05-27

## 2022-05-27 RX ORDER — SODIUM CHLORIDE, SODIUM LACTATE, POTASSIUM CHLORIDE, CALCIUM CHLORIDE 600; 310; 30; 20 MG/100ML; MG/100ML; MG/100ML; MG/100ML
INJECTION, SOLUTION INTRAVENOUS CONTINUOUS
Status: DISCONTINUED | OUTPATIENT
Start: 2022-05-27 | End: 2022-05-27

## 2022-05-27 RX ORDER — CARBOPROST TROMETHAMINE 250 UG/ML
250 INJECTION, SOLUTION INTRAMUSCULAR
Status: DISCONTINUED | OUTPATIENT
Start: 2022-05-27 | End: 2022-05-27

## 2022-05-27 RX ORDER — IBUPROFEN 800 MG/1
800 TABLET, FILM COATED ORAL 3 TIMES DAILY PRN
Status: DISCONTINUED | OUTPATIENT
Start: 2022-05-27 | End: 2022-05-28

## 2022-05-27 RX ORDER — OXYTOCIN/0.9 % SODIUM CHLORIDE 30/500 ML
100-340 PLASTIC BAG, INJECTION (ML) INTRAVENOUS CONTINUOUS PRN
Status: DISCONTINUED | OUTPATIENT
Start: 2022-05-27 | End: 2022-05-27

## 2022-05-27 RX ORDER — EPHEDRINE SULFATE 50 MG/ML
INJECTION, SOLUTION INTRAMUSCULAR; INTRAVENOUS; SUBCUTANEOUS
Status: DISCONTINUED
Start: 2022-05-27 | End: 2022-05-27 | Stop reason: WASHOUT

## 2022-05-27 RX ORDER — NALOXONE HYDROCHLORIDE 0.4 MG/ML
0.2 INJECTION, SOLUTION INTRAMUSCULAR; INTRAVENOUS; SUBCUTANEOUS
Status: DISCONTINUED | OUTPATIENT
Start: 2022-05-27 | End: 2022-05-27

## 2022-05-27 RX ORDER — BISACODYL 10 MG
10 SUPPOSITORY, RECTAL RECTAL DAILY PRN
Status: DISCONTINUED | OUTPATIENT
Start: 2022-05-27 | End: 2022-05-28

## 2022-05-27 RX ORDER — HYDROCORTISONE 25 MG/G
CREAM TOPICAL 3 TIMES DAILY PRN
Status: DISCONTINUED | OUTPATIENT
Start: 2022-05-27 | End: 2022-05-28

## 2022-05-27 RX ORDER — TRANEXAMIC ACID 10 MG/ML
1 INJECTION, SOLUTION INTRAVENOUS EVERY 30 MIN PRN
Status: DISCONTINUED | OUTPATIENT
Start: 2022-05-27 | End: 2022-05-27

## 2022-05-27 RX ORDER — FENTANYL CITRATE 50 UG/ML
100 INJECTION, SOLUTION INTRAMUSCULAR; INTRAVENOUS
Status: DISCONTINUED | OUTPATIENT
Start: 2022-05-27 | End: 2022-05-27

## 2022-05-27 RX ORDER — IBUPROFEN 800 MG/1
800 TABLET, FILM COATED ORAL
Status: DISCONTINUED | OUTPATIENT
Start: 2022-05-27 | End: 2022-05-27

## 2022-05-27 RX ORDER — DIPHENHYDRAMINE HCL 25 MG
25 CAPSULE ORAL EVERY 6 HOURS PRN
Status: DISCONTINUED | OUTPATIENT
Start: 2022-05-27 | End: 2022-05-27

## 2022-05-27 RX ORDER — ACETAMINOPHEN 325 MG/1
650 TABLET ORAL EVERY 4 HOURS PRN
Status: DISCONTINUED | OUTPATIENT
Start: 2022-05-27 | End: 2022-05-28

## 2022-05-27 RX ORDER — BUPIVACAINE HYDROCHLORIDE 2.5 MG/ML
INJECTION, SOLUTION EPIDURAL; INFILTRATION; INTRACAUDAL
Status: COMPLETED | OUTPATIENT
Start: 2022-05-27 | End: 2022-05-27

## 2022-05-27 RX ORDER — NALOXONE HYDROCHLORIDE 0.4 MG/ML
0.4 INJECTION, SOLUTION INTRAMUSCULAR; INTRAVENOUS; SUBCUTANEOUS
Status: DISCONTINUED | OUTPATIENT
Start: 2022-05-27 | End: 2022-05-27

## 2022-05-27 RX ORDER — FENTANYL CITRATE-0.9 % NACL/PF 10 MCG/ML
100 PLASTIC BAG, INJECTION (ML) INTRAVENOUS EVERY 5 MIN PRN
Status: DISCONTINUED | OUTPATIENT
Start: 2022-05-27 | End: 2022-05-27

## 2022-05-27 RX ORDER — ONDANSETRON 2 MG/ML
4 INJECTION INTRAMUSCULAR; INTRAVENOUS EVERY 6 HOURS PRN
Status: DISCONTINUED | OUTPATIENT
Start: 2022-05-27 | End: 2022-05-27

## 2022-05-27 RX ORDER — CARBOPROST TROMETHAMINE 250 UG/ML
250 INJECTION, SOLUTION INTRAMUSCULAR
Status: DISCONTINUED | OUTPATIENT
Start: 2022-05-27 | End: 2022-05-28

## 2022-05-27 RX ORDER — PROCHLORPERAZINE 25 MG
25 SUPPOSITORY, RECTAL RECTAL EVERY 12 HOURS PRN
Status: DISCONTINUED | OUTPATIENT
Start: 2022-05-27 | End: 2022-05-27

## 2022-05-27 RX ORDER — METHYLERGONOVINE MALEATE 0.2 MG/ML
200 INJECTION INTRAVENOUS
Status: DISCONTINUED | OUTPATIENT
Start: 2022-05-27 | End: 2022-05-28

## 2022-05-27 RX ORDER — BUPIVACAINE HYDROCHLORIDE 2.5 MG/ML
INJECTION, SOLUTION EPIDURAL; INFILTRATION; INTRACAUDAL
Status: COMPLETED
Start: 2022-05-27 | End: 2022-05-27

## 2022-05-27 RX ORDER — ONDANSETRON 4 MG/1
4 TABLET, ORALLY DISINTEGRATING ORAL EVERY 6 HOURS PRN
Status: DISCONTINUED | OUTPATIENT
Start: 2022-05-27 | End: 2022-05-27

## 2022-05-27 RX ORDER — TRANEXAMIC ACID 10 MG/ML
1 INJECTION, SOLUTION INTRAVENOUS EVERY 30 MIN PRN
Status: DISCONTINUED | OUTPATIENT
Start: 2022-05-27 | End: 2022-05-28

## 2022-05-27 RX ORDER — METOCLOPRAMIDE HYDROCHLORIDE 5 MG/ML
10 INJECTION INTRAMUSCULAR; INTRAVENOUS EVERY 6 HOURS PRN
Status: DISCONTINUED | OUTPATIENT
Start: 2022-05-27 | End: 2022-05-27

## 2022-05-27 RX ORDER — FENTANYL CITRATE 50 UG/ML
INJECTION, SOLUTION INTRAMUSCULAR; INTRAVENOUS
Status: COMPLETED | OUTPATIENT
Start: 2022-05-27 | End: 2022-05-27

## 2022-05-27 RX ORDER — KETOROLAC TROMETHAMINE 30 MG/ML
30 INJECTION, SOLUTION INTRAMUSCULAR; INTRAVENOUS
Status: DISCONTINUED | OUTPATIENT
Start: 2022-05-27 | End: 2022-05-27

## 2022-05-27 RX ORDER — OXYTOCIN/0.9 % SODIUM CHLORIDE 30/500 ML
340 PLASTIC BAG, INJECTION (ML) INTRAVENOUS CONTINUOUS PRN
Status: COMPLETED | OUTPATIENT
Start: 2022-05-27 | End: 2022-05-27

## 2022-05-27 RX ORDER — NALBUPHINE HYDROCHLORIDE 10 MG/ML
2.5-5 INJECTION, SOLUTION INTRAMUSCULAR; INTRAVENOUS; SUBCUTANEOUS EVERY 6 HOURS PRN
Status: DISCONTINUED | OUTPATIENT
Start: 2022-05-27 | End: 2022-05-27

## 2022-05-27 RX ORDER — DOCUSATE SODIUM 100 MG/1
100 CAPSULE, LIQUID FILLED ORAL DAILY
Status: DISCONTINUED | OUTPATIENT
Start: 2022-05-27 | End: 2022-05-28

## 2022-05-27 RX ORDER — MODIFIED LANOLIN
OINTMENT (GRAM) TOPICAL
Status: DISCONTINUED | OUTPATIENT
Start: 2022-05-27 | End: 2022-05-28

## 2022-05-27 RX ORDER — PROCHLORPERAZINE MALEATE 10 MG
10 TABLET ORAL EVERY 6 HOURS PRN
Status: DISCONTINUED | OUTPATIENT
Start: 2022-05-27 | End: 2022-05-27

## 2022-05-27 RX ORDER — LIDOCAINE 40 MG/G
CREAM TOPICAL
Status: DISCONTINUED | OUTPATIENT
Start: 2022-05-27 | End: 2022-05-27

## 2022-05-27 RX ORDER — OXYTOCIN 10 [USP'U]/ML
10 INJECTION, SOLUTION INTRAMUSCULAR; INTRAVENOUS
Status: DISCONTINUED | OUTPATIENT
Start: 2022-05-27 | End: 2022-05-28

## 2022-05-27 RX ORDER — MISOPROSTOL 200 UG/1
400 TABLET ORAL
Status: DISCONTINUED | OUTPATIENT
Start: 2022-05-27 | End: 2022-05-28

## 2022-05-27 RX ORDER — MISOPROSTOL 200 UG/1
400 TABLET ORAL
Status: DISCONTINUED | OUTPATIENT
Start: 2022-05-27 | End: 2022-05-27

## 2022-05-27 RX ORDER — DIPHENHYDRAMINE HYDROCHLORIDE 50 MG/ML
25 INJECTION INTRAMUSCULAR; INTRAVENOUS EVERY 6 HOURS PRN
Status: DISCONTINUED | OUTPATIENT
Start: 2022-05-27 | End: 2022-05-27

## 2022-05-27 RX ORDER — ACETAMINOPHEN 325 MG/1
650 TABLET ORAL EVERY 4 HOURS PRN
Status: DISCONTINUED | OUTPATIENT
Start: 2022-05-27 | End: 2022-05-27

## 2022-05-27 RX ADMIN — SODIUM CHLORIDE, POTASSIUM CHLORIDE, SODIUM LACTATE AND CALCIUM CHLORIDE: 600; 310; 30; 20 INJECTION, SOLUTION INTRAVENOUS at 17:00

## 2022-05-27 RX ADMIN — Medication 340 ML/HR: at 17:19

## 2022-05-27 RX ADMIN — Medication 40 MCG: at 11:03

## 2022-05-27 RX ADMIN — SODIUM CHLORIDE, POTASSIUM CHLORIDE, SODIUM LACTATE AND CALCIUM CHLORIDE 1000 ML: 600; 310; 30; 20 INJECTION, SOLUTION INTRAVENOUS at 15:55

## 2022-05-27 RX ADMIN — Medication 40 MCG: at 12:02

## 2022-05-27 RX ADMIN — FENTANYL CITRATE 25 MCG: 50 INJECTION, SOLUTION INTRAMUSCULAR; INTRAVENOUS at 16:56

## 2022-05-27 RX ADMIN — BUPIVACAINE HYDROCHLORIDE 1.5 ML: 2.5 INJECTION, SOLUTION EPIDURAL; INFILTRATION; INTRACAUDAL at 16:56

## 2022-05-27 RX ADMIN — Medication 40 MCG: at 13:02

## 2022-05-27 RX ADMIN — Medication 20 MCG: at 08:58

## 2022-05-27 RX ADMIN — Medication 40 MCG: at 14:12

## 2022-05-27 RX ADMIN — Medication 20 MCG: at 10:00

## 2022-05-27 ASSESSMENT — ACTIVITIES OF DAILY LIVING (ADL)
ADLS_ACUITY_SCORE: 18

## 2022-05-27 NOTE — ANESTHESIA PROCEDURE NOTES
Intrathecal injection Procedure Note    Pre-Procedure   Staff -        CRNA: Jaya Gallardo APRN CRNA       Performed By: CRNA       Location: OB       Procedure Start/Stop Times: 5/27/2022 4:50 PM and 5/27/2022 4:56 PM       Pre-Anesthestic Checklist: patient identified, IV checked, risks and benefits discussed, informed consent, monitors and equipment checked, pre-op evaluation, at physician/surgeon's request and post-op pain management  Timeout:       Correct Patient: Yes        Correct Procedure: Yes        Correct Site: Yes        Correct Position: Yes   Procedure Documentation  Procedure: intrathecal injection       Patient Position: sitting       Skin prep: Betadine       Insertion Site: L3-4. (midline approach).       Needle Gauge: 25.        Needle Length (Inches): 3.5        Spinal Needle Type: Pencan       Introducer used       Introducer: 20 G       # of attempts: 1 and  # of redirects:     Assessment/Narrative         CSF fluid: clear.    Medication(s) Administered   0.25% PF Bupivacaine (Intrathecal) - Intrathecal   1.5 mL - 5/27/2022 4:56:00 PM  Fentanyl PF (Intrathecal) - Intrathecal   25 mcg - 5/27/2022 4:56:00 PM  Medication Administration Time: 5/27/2022 4:50 PM

## 2022-05-27 NOTE — ANESTHESIA PREPROCEDURE EVALUATION
Anesthesia Pre-Procedure Evaluation    Patient: Viki Busch   MRN: [id number] : 1985        Procedure : * No procedures listed *          Past Medical History:   Diagnosis Date     Abnormal cervical Papanicolaou smear      Herpes zoster without complication 2021    right hip, not treated     Infection due to 2019 novel coronavirus 2021     PCOS (polycystic ovarian syndrome) 2017     Premenstrual syndrome 2017      Past Surgical History:   Procedure Laterality Date     HC TOOTH EXTRACTION W/FORCEP Bilateral 2016      No Known Allergies   Social History     Tobacco Use     Smoking status: Former Smoker     Packs/day: 0.20     Years: 6.00     Pack years: 1.20     Types: Cigarettes     Smokeless tobacco: Never Used     Tobacco comment: quit    Substance Use Topics     Alcohol use: Not Currently     Comment: 2-3x/month      Wt Readings from Last 1 Encounters:   22 86.2 kg (190 lb)        Anesthesia Evaluation   Pt has had prior anesthetic. Type: OB Labor Epidural.    No history of anesthetic complications       ROS/MED HX  ENT/Pulmonary:  - neg pulmonary ROS     Neurologic:  - neg neurologic ROS     Cardiovascular:  - neg cardiovascular ROS     METS/Exercise Tolerance:     Hematologic:  - neg hematologic  ROS     Musculoskeletal:       GI/Hepatic:  - neg GI/hepatic ROS     Renal/Genitourinary:       Endo:  - neg endo ROS     Psychiatric/Substance Use:  - neg psychiatric ROS     Infectious Disease:       Malignancy:       Other:     (-) previous  and TOLAC candidate       Physical Exam    Airway        Mallampati: II   TM distance: > 3 FB   Neck ROM: full   Mouth opening: > 3 cm    Respiratory Devices and Support         Dental  no notable dental history         Cardiovascular   cardiovascular exam normal          Pulmonary   pulmonary exam normal                OUTSIDE LABS:  CBC:   Lab Results   Component Value Date    WBC 5.3 2022    WBC 7.9 2022    HGB 13.2  05/27/2022    HGB 13.0 03/28/2022    HCT 38.8 05/27/2022    HCT 38.2 03/28/2022     05/27/2022     03/28/2022     BMP: No results found for: NA, POTASSIUM, CHLORIDE, CO2, BUN, CR, GLC  COAGS: No results found for: PTT, INR, FIBR  POC:   Lab Results   Component Value Date    HCG Positive (A) 10/07/2021     HEPATIC: No results found for: ALBUMIN, PROTTOTAL, ALT, AST, GGT, ALKPHOS, BILITOTAL, BILIDIRECT, ANA  OTHER:   Lab Results   Component Value Date    TSH 3.12 01/17/2022       Anesthesia Plan    ASA Status:  2      Anesthesia Type: Spinal.              Consents    Anesthesia Plan(s) and associated risks, benefits, and realistic alternatives discussed. Questions answered and patient/representative(s) expressed understanding.    - Discussed:     - Discussed with:  Patient         Postoperative Care            Comments:           neg OB ROS.       HALLE Kebede CRNA

## 2022-05-27 NOTE — H&P
[facility] Labor and Delivery History and Physical    Viki Mota MRN# [id number]   Age: 36 year old YOB: 1985     Date of Admission:  2022    Primary care provider: Ludy Reza           Chief Complaint:   Viki Mota is a 36 year old female who is 39w0d pregnant and being admitted for induction of labor, indication AMA.          Pregnancy history:     OBSTETRIC HISTORY:    OB History    Para Term  AB Living   6 3 1 0 2 4   SAB IAB Ectopic Multiple Live Births   2 0 0 0 0      # Outcome Date GA Lbr Chi/2nd Weight Sex Delivery Anes PTL Lv   6 Current            5 Term 03/15/18 40w2d 21:50 / 00:27 3.805 kg (8 lb 6.2 oz) F Vag-Spont INT N       Name: JITENDRA MOTA      Apgar1: 7  Apgar5: 8   4 SAB            3 SAB            2 Para            1 Para               Obstetric Comments   Breast and bottle fed       EDC: Estimated Date of Delivery: Jim 3, 2022    Prenatal Labs:   Lab Results   Component Value Date    ABO A 2018    RH Pos 2018    AS Negative 2022    HEPBANG Nonreactive 2022    CHPCRT Negative 2017    GCPCRT Negative 2017    TREPAB Negative 2018    HGB 13.2 2022       GBS Status:   Lab Results   Component Value Date    GBS Negative 2018       Active Problem List  Patient Active Problem List   Diagnosis     PCOS (polycystic ovarian syndrome)     Premenstrual syndrome     Hypovitaminosis D     Low serum progesterone     Galactorrhea     Procreative counseling and advice using natural family planning     Ectropion of cervix     Multigravida of advanced maternal age in third trimester     Supervision of other high risk pregnancies, third trimester     ACP (advance care planning)     Excessive fetal growth affecting management of pregnancy in third trimester, single or unspecified fetus     Encounter for induction of labor       Medication Prior to Admission  Medications Prior to Admission  "  Medication Sig Dispense Refill Last Dose     Prenatal Vit-Fe Fumarate-FA (PRENATAL MULTIVITAMIN W/IRON) 27-0.8 MG tablet Take 1 tablet by mouth daily 90 tablet 3 5/26/2022 at 2030   .        Maternal Past Medical History:     Past Medical History:   Diagnosis Date     Abnormal cervical Papanicolaou smear 2010     Herpes zoster without complication 07/2021    right hip, not treated     Infection due to 2019 novel coronavirus 11/2021     PCOS (polycystic ovarian syndrome) 2017     Premenstrual syndrome 2017                       Family History:     Family History   Problem Relation Age of Onset     Thyroid Disease Mother      Hyperlipidemia Father      Family History Negative Sister      Family History Negative Brother      Family History Negative Brother      Other - See Comments Maternal Grandmother         depression     Other - See Comments Maternal Grandfather         old age     Other - See Comments Paternal Grandmother         old age     Other - See Comments Paternal Grandfather         old age               Social History:     Social History     Tobacco Use     Smoking status: Former Smoker     Packs/day: 0.20     Years: 6.00     Pack years: 1.20     Types: Cigarettes     Smokeless tobacco: Never Used     Tobacco comment: quit 2010   Substance Use Topics     Alcohol use: Not Currently     Comment: 2-3x/month            Review of Systems:   Review of systems negative except as stated above.         Physical Exam:   Vitals were reviewed  Blood pressure 122/66, pulse 68, temperature 98.5  F (36.9  C), temperature source Oral, resp. rate 16, height 1.727 m (5' 8\"), weight 86.2 kg (190 lb), last menstrual period 08/30/2021, SpO2 (!) 91 %, unknown if currently breastfeeding.  Constitutional:   awake, alert, cooperative, no apparent distress, and appears stated age   Eyes:   Lids and lashes normal, pupils equal, round and reactive to light, extra ocular muscles intact, sclera clear, conjunctiva normal   ENT:   " Normocephalic, without obvious abnormality, atramatic, external ears without lesions, oral pharynx with moist mucus membranes, tonsils without erythema or exudates, gums normal and good dentition.   Neck:   Supple, symmetrical, trachea midline, no adenopathy, thyroid symmetric, not enlarged and no tenderness, skin normal   Hematologic / Lymphatic:   no cervical lymphadenopathy   Lungs:   No increased work of breathing, good air exchange, clear to auscultation bilaterally, no crackles or wheezing   Cardiovascular:   Normal apical impulse, regular rate and rhythm, normal S1 and S2, no S3 or S4, and no murmur noted   Abdomen:   No scars, normal bowel sounds, soft, non-distended, non-tender, no masses palpated, no hepatosplenomegally   Genitounirinary:   External Genitalia:  General appearance; normal  Vagina:  General appearance normal  Cervix:  See below  Uterus:  gravid   Musculoskeletal:   There is no redness, warmth, or swelling of the joints.  Full range of motion noted.  Motor strength is 5 out of 5 all extremities bilaterally.  Tone is normal.   Neurologic:   Awake, alert, oriented to name, place and time.  Cranial nerves II-XII are grossly intact.  Motor is 5 out of 5 bilaterally.  Sensory is intact.  gait is normal.   Neuropsychiatric:   General: normal, calm and normal eye contact  Level of consciousness: alert / normal  Orientation: oriented to self, place, time and situation   Skin:   no bruising or bleeding and no rashes, scattered tattooes      Cervix:   Membranes: intact   Dilation: 2   Effacement: 40%   Station:-2   Consistency: soft   Position: Posterior  Presentation:Vertex  Fetal Heart Rate Tracing: reactive and reassuring  Tocometer: external monitor                       Assessment:   Viki Busch is a 39w0d pregnant female admitted with induction of labor, indication AMA.          Plan:   Admit - see IP orders  Labor induction with cytotec for 6 doses  Anticipate JADYN Reza  MD  5/27/2022  6:58 PM

## 2022-05-27 NOTE — ANESTHESIA POSTPROCEDURE EVALUATION
Patient: Viki Busch    Procedure: * No procedures listed *       Anesthesia Type:  No value filed.    Note:  Disposition: Inpatient   Postop Pain Control: Uneventful            Sign Out: Well controlled pain   PONV: No   Neuro/Psych: Uneventful            Sign Out: Acceptable/Baseline neuro status   Airway/Respiratory: Uneventful            Sign Out: Acceptable/Baseline resp. status   CV/Hemodynamics: Uneventful            Sign Out: Acceptable CV status; No obvious hypovolemia; No obvious fluid overload   Other NRE: NONE   DID A NON-ROUTINE EVENT OCCUR? No           Last vitals:  Vitals:    05/27/22 1650 05/27/22 1656 05/27/22 1658   BP:   129/82   Pulse:      Resp:      Temp:      SpO2: 92% (!) 89%        Electronically Signed By: HALLE Kebede CRNA  May 27, 2022  5:25 PM

## 2022-05-27 NOTE — PLAN OF CARE
Induction of Labor Admit Note  Viki Busch  MRN: [id number]  Gestational Age: 39w0d      Viki Busch presents for induction of labor for AMA.  Patient denies contractions, bleeding or ROM.    Past Medical History:   Diagnosis Date     Abnormal cervical Papanicolaou smear      Herpes zoster without complication 2021    right hip, not treated     Infection due to 2019 novel coronavirus 2021     PCOS (polycystic ovarian syndrome) 2017     Premenstrual syndrome 2017     OB Induction Plan: Waiting for MD to come to see patient.    Dr. Reza notified of patient's arrival and condition.      Patient is alert and oriented X 3, denies any pain. pain. Patient oriented to room, unit, hourly rounding, and plan of care. Call light within reach.  Explained admission packet with patient bill of rights brochure. Will continue to monitor and document as needed.     Inpatient nursing criteria listed below was met:    Health care directives status obtained and documented: Yes  Patient identifies a surrogate decision maker: Yes   If yes, who:Ge Contact Information:[phone]  Vaccine assessment done and vaccines ordered if appropriate. Yes  Clergy visit ordered if patient requests: N/A  Skin issues/needs documented:N/A  Isolation needs addressed, if appropriate: N/A  Fall Prevention (Med and High risk): Care plan updated, Education given and documented and signage used: Yes  Care Plan initiated: Yes  Education Documented (Reminder to educate patient if MRSA is present on admission): Yes  Education Assessment documented:Yes  Patient has discharge needs (If yes, please explain): No

## 2022-05-27 NOTE — ANESTHESIA CARE TRANSFER NOTE
Patient: Viki Busch    Procedure: * No procedures listed *       Diagnosis: * No pre-op diagnosis entered *  Diagnosis Additional Information: No value filed.    Anesthesia Type:   No value filed.     Note:      Level of Consciousness: awake  Oxygen Supplementation: room air    Independent Airway: airway patency satisfactory and stable  Dentition: dentition unchanged  Vital Signs Stable: post-procedure vital signs reviewed and stable  Report to RN Given: handoff report given  Patient transferred to: Labor and Delivery    Handoff Report: Identifed the Patient, Identified the Reponsible Provider, Reviewed the pertinent medical history, Discussed the surgical course, Reviewed Intra-OP anesthesia mangement and issues during anesthesia, Set expectations for post-procedure period and Allowed opportunity for questions and acknowledgement of understanding      Vitals:  Vitals Value Taken Time   /69 05/27/22 1713   Temp     Pulse     Resp     SpO2 91 % 05/27/22 1711   Vitals shown include unvalidated device data.    Electronically Signed By: HALLE Kebede CRNA  May 27, 2022  5:21 PM

## 2022-05-28 VITALS
HEART RATE: 72 BPM | WEIGHT: 190 LBS | SYSTOLIC BLOOD PRESSURE: 115 MMHG | DIASTOLIC BLOOD PRESSURE: 72 MMHG | OXYGEN SATURATION: 98 % | HEIGHT: 68 IN | TEMPERATURE: 97.6 F | BODY MASS INDEX: 28.79 KG/M2 | RESPIRATION RATE: 16 BRPM

## 2022-05-28 PROBLEM — O09.529 ANTEPARTUM MULTIGRAVIDA OF ADVANCED MATERNAL AGE: Status: ACTIVE | Noted: 2021-08-26

## 2022-05-28 LAB
HGB BLD-MCNC: 12.8 G/DL (ref 11.7–15.7)
T PALLIDUM AB SER QL: NONREACTIVE

## 2022-05-28 PROCEDURE — 00HU33Z INSERTION OF INFUSION DEVICE INTO SPINAL CANAL, PERCUTANEOUS APPROACH: ICD-10-PCS | Performed by: FAMILY MEDICINE

## 2022-05-28 PROCEDURE — 250N000013 HC RX MED GY IP 250 OP 250 PS 637: Performed by: FAMILY MEDICINE

## 2022-05-28 PROCEDURE — 36415 COLL VENOUS BLD VENIPUNCTURE: CPT | Performed by: FAMILY MEDICINE

## 2022-05-28 PROCEDURE — 85018 HEMOGLOBIN: CPT | Performed by: FAMILY MEDICINE

## 2022-05-28 RX ADMIN — IBUPROFEN 800 MG: 800 TABLET, FILM COATED ORAL at 08:34

## 2022-05-28 ASSESSMENT — ACTIVITIES OF DAILY LIVING (ADL)
ADLS_ACUITY_SCORE: 18

## 2022-05-28 NOTE — PLAN OF CARE
Assessments completed as charted. B/P: 122/66, T: 98.5, P: 68, R: 16. Rates pain: 0/10 patient denies pain. Voiding without difficulty. Fundus: Midline Firm U/1. Lochia: Light. Activity: unrestricted with out pain . Infant feeding: Breast feeding going well.           Postpartum breastfeeding assessment completed and education provided, see Patient Education Activity.  Items included in the education are:   proper positioning and latch  effectiveness of feeding  manual expression  handling and storing breastmilk  maintenance of breastfeeding for the first 6 months  sign/symptoms of infant feeding issues requiring referral to qualified health care provider  Postpartum care education provided, see Patient Education activity. Patient denies needs. Will monitor.  Nell Jefferson RN

## 2022-05-28 NOTE — PLAN OF CARE
Called patient to confirm appt with Dr Rivera Monday at 1:00.   Patient discharged at 6:15 PM via ambulation accompanied by significant other and son and staff. Prescriptions - None ordered for discharge. All belongings sent with patient.     Discharge instructions reviewed with pt and . Listed belongings gathered and returned to patient. AVS reviewed and signed. ID bands matched and paperwork completed.     Patient discharged to home.     Pneumonia and Influenza given prior to discharge, if indicated: N/A    Surgical Patient   Surgical Procedures during stay: no  Did patient receive discharge instruction on wound care and recognition of infection symptoms? Yes    MISC  Follow up appointment made:   MD instructed pt to call clinic Tuesday to make follow up appt.  Home and hospital aquired medications returned to patient: N/A  Patient reports pain was well managed at discharge: Yes

## 2022-05-28 NOTE — DISCHARGE SUMMARY
"Discharge Summary    Viki Busch MRN# [id number]   YOB: 1985 Age: 36 year old     Date of Admission:  5/27/2022  Date of Discharge:  5/28/2022  Admitting Physician:  Ludy Reza MD  Discharge Physician:  Ludy Reza MD  Discharging Service:  [facility]     Home clinic: [facility]  Primary Provider: Ludy Reza          Admission Diagnoses:   Encounter for induction of labor [Z34.90]          Discharge Diagnosis:   Patient Active Problem List   Diagnosis     PCOS (polycystic ovarian syndrome)     Premenstrual syndrome     Hypovitaminosis D     Low serum progesterone     Galactorrhea     Procreative counseling and advice using natural family planning     Ectropion of cervix     Multigravida of advanced maternal age in third trimester     Supervision of other high risk pregnancies, third trimester     ACP (advance care planning)     Excessive fetal growth affecting management of pregnancy in third trimester, single or unspecified fetus     Encounter for induction of labor                Discharge Disposition:   Discharged to home           Condition on Discharge:   Discharge condition: Stable   Discharge vitals: Blood pressure 115/72, pulse 72, temperature 97.6  F (36.4  C), temperature source Oral, resp. rate 16, height 1.727 m (5' 8\"), weight 86.2 kg (190 lb), last menstrual period 08/30/2021, SpO2 98 %, unknown if currently breastfeeding.   Code status on discharge: Full Code           Procedures / Labs / Imaging:   No procedures performed during this admission          Medications Prior to Admission:     Medications Prior to Admission   Medication Sig Dispense Refill Last Dose     Prenatal Vit-Fe Fumarate-FA (PRENATAL MULTIVITAMIN W/IRON) 27-0.8 MG tablet Take 1 tablet by mouth daily 90 tablet 3 5/26/2022 at 2030             Discharge Medications:     Current Facility-Administered Medications   Medication     acetaminophen (TYLENOL) tablet 650 mg     " "benzocaine-menthol (DERMOPLAST) topical spray     bisacodyl (DULCOLAX) Suppository 10 mg     carboprost (HEMABATE) injection 250 mcg     docusate sodium (COLACE) capsule 100 mg     hydrocortisone (Perianal) (ANUSOL-HC) 2.5 % cream     ibuprofen (ADVIL/MOTRIN) tablet 800 mg     lanolin cream     methylergonovine (METHERGINE) injection 200 mcg     misoprostol (CYTOTEC) tablet 400 mcg    Or     misoprostol (CYTOTEC) suppository 800 mcg     No MMR Needed - Assessment: Patient does not need MMR vaccine     No Tdap Needed - Assessment: Patient does not need Tdap vaccine     oxytocin (PITOCIN) 30 units in 500 mL 0.9% NaCl infusion     oxytocin (PITOCIN) injection 10 Units     tranexamic acid 1 g in 100 mL NS IV bag (premix)             Consultations:   No consultations were requested during this admission             Brief History of Illness:   Viki Busch is a 36 year old female who was admitted for induction second to [facility] Course:     Encounter for induction of labor    * No resolved hospital problems. *              Final Day of Progress before Discharge:       Assessment and Plan:  Active Problems:    Antepartum multigravida of advanced maternal age    Assessment: reason for induction    Plan: 6 doses of cytotec and SROM allowed to complete cervical dilation    Encounter for induction of labor    Assessment:     Plan: see above     (normal spontaneous vaginal delivery)    Assessment: did receive ITN    Plan: normal delivery without laceration                Physical Exam:  Vitals were reviewed  Blood pressure 115/72, pulse 72, temperature 97.6  F (36.4  C), temperature source Oral, resp. rate 16, height 1.727 m (5' 8\"), weight 86.2 kg (190 lb), last menstrual period 2021, SpO2 98 %, unknown if currently breastfeeding.  Constitutional:   awake, alert, cooperative, no apparent distress, and appears stated age     Lungs:   No increased work of breathing, good air exchange, clear to " auscultation bilaterally, no crackles or wheezing     Cardiovascular:   Normal apical impulse, regular rate and rhythm, normal S1 and S2, no S3 or S4, and no murmur noted     Neuropsychiatric:   General: normal, calm and normal eye contact  Affect: normal and pleasant     Skin:   no bruising or bleeding     Additional findings:   ABd:  Soft, nontender, BS+, fundus well below umbilicus          Data:  All laboratory data reviewed         Significant Results:   None             Pending Results:   None           Discharge Instructions and Follow-Up:   Discharge diet: Regular   Discharge activity: Activity as tolerated   Discharge follow-up: Follow up with primary care provider in 6 weeks   Outpatient therapy: None    Home Care agency: None    Supplies and equipment: None   Lines and drains: None    Wound care: None   Other instructions: None      Ludy Reza MD

## 2022-05-28 NOTE — PLAN OF CARE
VSS and afebrile. Assessments completed as charted.Fundus firm at umbilicus, lochia scant with no large clots noted per pt. Perineum intact. Mom up independently in room. Ohe rooming in and bonding well. States pain 0 (No pain). Reports relief from Ibuprofen. Denies any needs or concerns at this time. Planning to discharge home per MD order this afternoon with babe.

## 2022-05-28 NOTE — DISCHARGE INSTRUCTIONS
Follow-up will be scheduled when infant Nick comes for follow-up in clinic.  Continue prenatal vitamins    Vaginal Delivery or  Birth   Discharge Instructions    Activity: Go back to your normal activities    Diet: You may eat a regular diet. Drink plenty of fluids.      Call your Doctor  if you have any of these symptoms:    * You soak a sanitary pad with blood within 1 hour, or you see clots larger than a  golf ball.    * Bleeding that lasts more than 6 weeks.     *Bad-smelling fluid that comes out of your vagina.    *A fever above 100.4 degrees Fahrenheit (38.4 degrees Celsius) with or without chills.    * Severe pain, cramping, or tenderness in your lower belly area.    * Increased pain, swelling, redness or fluid around your stitches.    * A need to urinate more frequently (use the toilet more often), more urgently (use the toilet very quickly), or it burns when you urinate.    * Redness, swelling, or pain around a vein in your leg.    * Problems coping with sadness, anxiety, or depression.    * Problems breastfeeding, or a red or painful area on your breast.    * You have questions or concerns after you return home.      [facility]: [phone]

## 2022-05-28 NOTE — PLAN OF CARE
Face to face report given with opportunity to observe patient.    Report given to Nicole Jefferson RN   5/28/2022  7:12 AM

## 2022-05-28 NOTE — L&D DELIVERY NOTE
OB Vaginal Delivery Note    Viki Busch MRN# [id number]   Age: 36 year old YOB: 1985       GA: 39w1d  GP:   Labor Complications: None   EBL:   mL  Delivery QBL: 225 mL  Delivery Type: Vaginal, Spontaneous   ROM to Delivery Time: (Delivered) Hours: 3 Minutes: 44   Weight: 3.385 kg (7 lb 7.4 oz)    1 Minute 5 Minute 10 Minute   Apgar Totals: 9   9             Delivery Details:  Viki Busch, a 36 year old  female delivered a viable infant with apgars of 9  and 9 . Patient was fully dilated and pushing after   hours   minutes in active labor. Delivery was via vaginal, spontaneous  to a sterile field under intrathecal  anesthesia. Infant delivered in vertex    occiput  anterior  position. Anterior and posterior shoulders delivered without difficulty. The cord was clamped, cut twice and 3 vessels  were noted. Cord blood was obtained in routine fashion with the following disposition: lab .      Cord complications: nuchal   Placenta delivered at 2022  5:20 PM . Placental disposition was Hospital disposal . Fundal massage performed and fundus found to be firm.     Episiotomy: none    Perineum, vagina, cervix were inspected, and the following lacerations were noted:   Perineal lacerations: none                Any lacerations were repaired in the usual fashion using n/a.    Excellent hemostasis was noted. Needle count correct. Infant and patient in delivery room in good and stable condition.        Randall Busch [[id number]]    Labor Event Times    Labor onset date: 22 Onset time:  3:55 PM   Dilation complete date: 22 Complete time:  5:09 PM   Start pushing date/time: 2022 1714      Labor Events     labor?: No   steroids: None  Labor Type: Induction/Cervical ripening  Predominate monitoring during 1st stage: intermittent electronic fetal monitoring     Antibiotics received during labor?: No     Rupture date/time: 22 1330   Rupture  type: Spontaneous rupture of membranes occuring during spontaneous labor or augmentation  Fluid color: Clear  Fluid odor: Normal     Induction: Misoprostol  Induction date/time: 22 0730   Cervical ripening date/time:        Augmentation: None  1:1 continuous labor support provided by?: RN       Delivery/Placenta Date and Time    Delivery Date: 22 Delivery Time:  5:15 PM   Placenta Date/Time: 2022  5:20 PM  Oxytocin given at the time of delivery: after delivery of baby  Delivering clinician: Ludy Reza MD          Vaginal Counts     Initial count performed by 2 team members:  Two Team Members   Paulina Rivera RN       [employer] Suture Needles Sponges (RETIRED) Instruments   Initial counts 1 0 15    Added to count       Relief counts       Final counts 1 0 15          Placed during labor Accounted for at the end of labor   FSE NA NA   IUPC NA NA   Cervidil NA NA              Final count performed by 2 team members:  Two Team Members   CARMEN Nuñez RN      Final count correct?: Yes     Apgars    Living status: Living   1 Minute 5 Minute 10 Minute 15 Minute 20 Minute   Skin color: 1  1       Heart rate: 2  2       Reflex irritability: 2  2       Muscle tone: 2  2       Respiratory effort: 2  2       Total: 9  9       Apgars assigned by: NATHEN KIRKPATRICK RN     Cord    Vessels: 3 Vessels    Cord Complications: Nuchal   Nuchal Intervention: reduced         Nuchal cord description: loose nuchal cord         Cord Blood Disposition: Lab    Gases Sent?: No    Delayed cord clamping?: Yes    Stem cell collection?: No        Resuscitation    Methods: None  [facility] at Delivery: Vaginal Delivery Note   of viable Male.  Nursery RN and RT present.  Infant with spontaneous cry, to mother's abdomen, dried and stimulated. [place] cares provided.  Mother and baby in stable condition. Baby skin-to-skin with mom. ID bands placed on infant, mom and dad.   Output in Delivery Room: Voided    "   Measurements    Weight: 7 lb 7.4 oz Length: 1' 8\"   Head circumference: 34.3 cm Chest circumference: 35.6 cm   Abdominal girth: 13.5 cm  Output in delivery room: Voided     Skin to Skin and Feeding Plan    Skin to skin initiation date/time: 1841    Skin to skin with: Mother  Skin to skin end date/time:        Labor Events and Shoulder Dystocia    Fetal Tracing Prior to Delivery: Category 2  Shoulder dystocia present?: Neg     Delivery (Maternal) (Provider to Complete) ([id number])    Episiotomy: None  Perineal lacerations: None    Repair suture: None  Genital tract inspection done: Pos     Blood Loss  Mother: Alicia Buschscchandan INFANTE #[id number]   Start of Mother's Information    Delivery Blood Loss  22 1555 - 22 1150    Delivery QBL (mL) Hospital Encounter 225 mL    Total  225 mL         End of Mother's Information  Mother: Viki Busch #[id number]          Delivery - Provider to Complete ([id number])    Delivering clinician: Ludy Reza MD  Attempted Delivery Types (Choose all that apply): Spontaneous Vaginal Delivery  Delivery Type (Choose the 1 that will go to the Birth History): Vaginal, Spontaneous                                 Placenta    Date/Time: 2022  5:20 PM  Removal: Spontaneous  Comments: complete, intact, 3VC  Disposition: Hospital disposal           Anesthesia    Method: Intrathecal                Presentation and Position    Presentation: Vertex     Occiput Anterior                 Ludy Reza MD  "

## 2022-05-31 ENCOUNTER — TELEPHONE (OUTPATIENT)
Dept: FAMILY MEDICINE | Facility: OTHER | Age: 37
End: 2022-05-31
Payer: COMMERCIAL

## 2022-05-31 DIAGNOSIS — Z32.01 PREGNANCY TEST POSITIVE: ICD-10-CM

## 2022-05-31 DIAGNOSIS — E55.9 HYPOVITAMINOSIS D: Primary | ICD-10-CM

## 2022-05-31 NOTE — TELEPHONE ENCOUNTER
9:21 AM    Reason for Call: OVERBOOK    Patient is having the following symptoms: 2 week follow up and 2 week new baby   2022    The patient is requesting an appointment for Around 06/10/22 with Dr. Reza    Was an appointment offered for this call? No  If yes : Appointment type              Date    Preferred method for responding to this message: Telephone Call  What is your phone number ?  [phone]    If we cannot reach you directly, may we leave a detailed response at the number you provided? Yes    Can this message wait until your PCP/provider returns, if unavailable today? Not applicable,     Monalisa Lu

## 2022-06-01 NOTE — TELEPHONE ENCOUNTER
Spoke with patient and she would like to get an appointment set up for the week after Dr. Reza returns back in office for her and baby .      Is there a date and time you would like to work them in ?

## 2022-06-10 NOTE — TELEPHONE ENCOUNTER
Does she need appt? I only need to see baby and I can do Friday 6/17 145 unless he is having problems -- it should be a 2 wk wcc

## 2022-06-13 RX ORDER — PRENATAL VIT/IRON FUM/FOLIC AC 27MG-0.8MG
1 TABLET ORAL DAILY
Qty: 90 TABLET | Refills: 3 | Status: SHIPPED | OUTPATIENT
Start: 2022-06-13 | End: 2023-06-30

## 2022-09-11 ENCOUNTER — HEALTH MAINTENANCE LETTER (OUTPATIENT)
Age: 37
End: 2022-09-11

## 2022-10-28 ENCOUNTER — MEDICAL CORRESPONDENCE (OUTPATIENT)
Dept: PEDIATRICS | Facility: OTHER | Age: 37
End: 2022-10-28

## 2023-06-03 ENCOUNTER — HEALTH MAINTENANCE LETTER (OUTPATIENT)
Age: 38
End: 2023-06-03

## 2023-06-30 ENCOUNTER — LAB (OUTPATIENT)
Dept: LAB | Facility: OTHER | Age: 38
End: 2023-06-30
Payer: COMMERCIAL

## 2023-06-30 ENCOUNTER — OFFICE VISIT (OUTPATIENT)
Dept: FAMILY MEDICINE | Facility: OTHER | Age: 38
End: 2023-06-30
Attending: FAMILY MEDICINE
Payer: COMMERCIAL

## 2023-06-30 VITALS
WEIGHT: 169.2 LBS | OXYGEN SATURATION: 100 % | DIASTOLIC BLOOD PRESSURE: 78 MMHG | SYSTOLIC BLOOD PRESSURE: 124 MMHG | HEIGHT: 68 IN | HEART RATE: 91 BPM | BODY MASS INDEX: 25.64 KG/M2 | TEMPERATURE: 97.7 F

## 2023-06-30 DIAGNOSIS — O09.893 SUPERVISION OF OTHER HIGH RISK PREGNANCIES, THIRD TRIMESTER: Primary | ICD-10-CM

## 2023-06-30 DIAGNOSIS — N91.2 AMENORRHEA: ICD-10-CM

## 2023-06-30 DIAGNOSIS — Z32.01 PREGNANCY TEST POSITIVE: ICD-10-CM

## 2023-06-30 DIAGNOSIS — R79.89 LOW SERUM PROGESTERONE: ICD-10-CM

## 2023-06-30 LAB
HCG INTACT+B SERPL-ACNC: 1676 MIU/ML
PROGEST SERPL-MCNC: 0.4 NG/ML

## 2023-06-30 PROCEDURE — 99213 OFFICE O/P EST LOW 20 MIN: CPT | Performed by: FAMILY MEDICINE

## 2023-06-30 PROCEDURE — 84702 CHORIONIC GONADOTROPIN TEST: CPT

## 2023-06-30 PROCEDURE — 84144 ASSAY OF PROGESTERONE: CPT

## 2023-06-30 PROCEDURE — 36415 COLL VENOUS BLD VENIPUNCTURE: CPT

## 2023-06-30 RX ORDER — PRENATAL VIT/IRON FUM/FOLIC AC 27MG-0.8MG
1 TABLET ORAL DAILY
Qty: 90 TABLET | Refills: 3 | Status: SHIPPED | OUTPATIENT
Start: 2023-06-30 | End: 2023-09-15

## 2023-06-30 RX ORDER — PROGESTERONE 200 MG/1
200 CAPSULE ORAL AT BEDTIME
Qty: 60 CAPSULE | Refills: 3 | Status: SHIPPED | OUTPATIENT
Start: 2023-06-30 | End: 2023-11-01

## 2023-06-30 ASSESSMENT — PAIN SCALES - GENERAL: PAINLEVEL: NO PAIN (0)

## 2023-06-30 NOTE — PROGRESS NOTES
"  Assessment & Plan     Supervision of other high risk pregnancies, third trimester  LUCINDA 2/27/23 based on LMP  ultrasound  - US OB <14 Weeks w Transvaginal Single; Future    Low serum progesterone    - Progesterone; Future  - progesterone (PROMETRIUM) 200 MG capsule; Place 1 capsule (200 mg) vaginally At Bedtime And 1 capsule po at bedtime    Amenorrhea    - hCG Quantitative Pregnancy; Future    Pregnancy test positive    - Prenatal Vit-Fe Fumarate-FA (PRENATAL MULTIVITAMIN W/IRON) 27-0.8 MG tablet; Take 1 tablet by mouth daily    Provider  Link to [facility] Help Grid :[id number]}     BMI:   Estimated body mass index is 25.73 kg/m  as calculated from the following:    Height as of this encounter: 1.727 m (5' 8\").    Weight as of this encounter: 76.7 kg (169 lb 3.2 oz).   Weight management plan: Discussed healthy diet and exercise guidelines    Patient was agreeable to this plan and had no further questions.  There are no Patient Instructions on file for this visit.    No follow-ups on file.    Ludy Reza MD  [facility] - AROLDO Drew is a 37 year old, presenting for the following health issues:  Amenorrhea         No data to display              HPI     Menstrual Concern  Last period- thinks it started may 22 or 23   Onset/Duration:  took test last wednesday- was positive tested again Sunday and yesterday and were both positive.   Description:   Duration of bleeding episodes: 7 days  Frequency of periods: (1st day of one to 1st day of next):  every 26-28 days  Describe bleeding/flow:   Clots: No  Number of pads/day: 3-4 tampons         Cramping: mild and during  Accompanying Signs & Symptoms:  Lightheadedness: No  Temperature intolerance: No  Nosebleeds/Easy bruising: No  Vaginal Discharge: No  Acne: sometimes gets breakouts   Change in body hair: No  History:  Patient's last menstrual period was 05/22/2023 (approximate).  Previous normal periods: no   Contraceptive use: NO  Sexually " "active: YES  Any bleeding after intercourse: No        Review of Systems   Constitutional, HEENT, cardiovascular, pulmonary, gi and gu systems are negative, except as otherwise noted.      Objective    /78 (BP Location: Right arm, Patient Position: Sitting, Cuff Size: Adult Regular)   Pulse 91   Temp 97.7  F (36.5  C) (Tympanic)   Ht 1.727 m (5' 8\")   Wt 76.7 kg (169 lb 3.2 oz)   LMP 05/22/2023 (Approximate)   SpO2 100%   Breastfeeding Yes   BMI 25.73 kg/m    Body mass index is 25.73 kg/m .  Physical Exam   GENERAL: healthy, alert and no distress  RESP: lungs clear to auscultation - no rales, rhonchi or wheezes  CV: regular rate and rhythm, normal S1 S2, no S3 or S4, no murmur, click or rub, no peripheral edema and peripheral pulses strong  MS: no gross musculoskeletal defects noted, no edema  PSYCH: mentation appears normal, affect normal/bright    Results for orders placed or performed in visit on 06/30/23   hCG Quantitative Pregnancy     Status: Abnormal   Result Value Ref Range    hCG Quantitative 1,676 (H) <5 mIU/mL                 "

## 2023-07-03 ENCOUNTER — LAB (OUTPATIENT)
Dept: LAB | Facility: OTHER | Age: 38
End: 2023-07-03
Payer: COMMERCIAL

## 2023-07-03 ENCOUNTER — MYC MEDICAL ADVICE (OUTPATIENT)
Dept: FAMILY MEDICINE | Facility: OTHER | Age: 38
End: 2023-07-03
Payer: COMMERCIAL

## 2023-07-03 DIAGNOSIS — O09.893 SUPERVISION OF OTHER HIGH RISK PREGNANCIES, THIRD TRIMESTER: Primary | ICD-10-CM

## 2023-07-03 DIAGNOSIS — R79.89 LOW SERUM PROGESTERONE: Primary | ICD-10-CM

## 2023-07-03 LAB
HCG INTACT+B SERPL-ACNC: 4015 MIU/ML
HOLD SPECIMEN: NORMAL

## 2023-07-03 PROCEDURE — 84144 ASSAY OF PROGESTERONE: CPT | Performed by: FAMILY MEDICINE

## 2023-07-03 PROCEDURE — 36415 COLL VENOUS BLD VENIPUNCTURE: CPT

## 2023-07-03 PROCEDURE — 84702 CHORIONIC GONADOTROPIN TEST: CPT | Performed by: FAMILY MEDICINE

## 2023-07-05 LAB — PROGEST SERPL-MCNC: 42.4 NG/ML

## 2023-07-21 ENCOUNTER — HOSPITAL ENCOUNTER (OUTPATIENT)
Dept: ULTRASOUND IMAGING | Facility: HOSPITAL | Age: 38
Discharge: HOME | End: 2023-07-21
Attending: FAMILY MEDICINE | Admitting: FAMILY MEDICINE
Payer: COMMERCIAL

## 2023-07-21 DIAGNOSIS — O09.893 SUPERVISION OF OTHER HIGH RISK PREGNANCIES, THIRD TRIMESTER: ICD-10-CM

## 2023-07-21 PROCEDURE — 76801 OB US < 14 WKS SINGLE FETUS: CPT

## 2023-07-24 ENCOUNTER — MYC MEDICAL ADVICE (OUTPATIENT)
Dept: FAMILY MEDICINE | Facility: OTHER | Age: 38
End: 2023-07-24

## 2023-07-24 DIAGNOSIS — R79.89 LOW SERUM PROGESTERONE: Primary | ICD-10-CM

## 2023-07-25 NOTE — TELEPHONE ENCOUNTER
On [facility] 07/03/2023 PCP addressed: Stop taking and recheck in 2 weeks, level is very good.      On [facility] 07/24/2023 order put in for progesterone.  PCP okay with having pt wait until appointment which is scheduled for 08/09/2023.     Please see [facility] and advise.  Should pt hold or take her progesterone until her next progesterone lab?

## 2023-07-26 NOTE — TELEPHONE ENCOUNTER
Ok, I never heard that she had taken med night before labs  She SHOULD come in this week and hold dose night before to check progesterone

## 2023-07-27 ENCOUNTER — LAB (OUTPATIENT)
Dept: LAB | Facility: OTHER | Age: 38
End: 2023-07-27
Payer: COMMERCIAL

## 2023-07-27 DIAGNOSIS — R79.89 LOW SERUM PROGESTERONE: ICD-10-CM

## 2023-07-27 PROCEDURE — 84144 ASSAY OF PROGESTERONE: CPT

## 2023-07-27 PROCEDURE — 36415 COLL VENOUS BLD VENIPUNCTURE: CPT

## 2023-07-28 LAB — PROGEST SERPL-MCNC: 19.5 NG/ML

## 2023-08-08 LAB
ABO/RH(D): NORMAL
ANTIBODY SCREEN: NEGATIVE
SPECIMEN EXPIRATION DATE: NORMAL

## 2023-08-09 ENCOUNTER — PRENATAL OFFICE VISIT (OUTPATIENT)
Dept: FAMILY MEDICINE | Facility: OTHER | Age: 38
End: 2023-08-09
Attending: FAMILY MEDICINE
Payer: COMMERCIAL

## 2023-08-09 VITALS
TEMPERATURE: 98.4 F | OXYGEN SATURATION: 100 % | DIASTOLIC BLOOD PRESSURE: 68 MMHG | WEIGHT: 168.9 LBS | SYSTOLIC BLOOD PRESSURE: 125 MMHG | RESPIRATION RATE: 17 BRPM | BODY MASS INDEX: 25.68 KG/M2 | HEART RATE: 75 BPM

## 2023-08-09 DIAGNOSIS — R79.89 LOW SERUM PROGESTERONE: ICD-10-CM

## 2023-08-09 DIAGNOSIS — O09.893 SUPERVISION OF OTHER HIGH RISK PREGNANCIES, THIRD TRIMESTER: Primary | ICD-10-CM

## 2023-08-09 DIAGNOSIS — O09.529 AMA (ADVANCED MATERNAL AGE) MULTIGRAVIDA 35+, UNSPECIFIED TRIMESTER: ICD-10-CM

## 2023-08-09 LAB
AMPHETAMINES UR QL: NOT DETECTED
BARBITURATES UR QL SCN: NOT DETECTED
BENZODIAZ UR QL SCN: NOT DETECTED
BUPRENORPHINE UR QL: NOT DETECTED
CANNABINOIDS UR QL: NOT DETECTED
CLUE CELLS: NORMAL
COCAINE UR QL SCN: NOT DETECTED
D-METHAMPHET UR QL: NOT DETECTED
ERYTHROCYTE [DISTWIDTH] IN BLOOD BY AUTOMATED COUNT: 12.3 % (ref 10–15)
HCT VFR BLD AUTO: 41.7 % (ref 35–47)
HGB BLD-MCNC: 14.4 G/DL (ref 11.7–15.7)
MCH RBC QN AUTO: 29.6 PG (ref 26.5–33)
MCHC RBC AUTO-ENTMCNC: 34.5 G/DL (ref 31.5–36.5)
MCV RBC AUTO: 86 FL (ref 78–100)
METHADONE UR QL SCN: NOT DETECTED
OPIATES UR QL SCN: NOT DETECTED
OXYCODONE UR QL SCN: NOT DETECTED
PCP UR QL SCN: NOT DETECTED
PLATELET # BLD AUTO: 271 10E3/UL (ref 150–450)
PROPOXYPH UR QL: NOT DETECTED
RBC # BLD AUTO: 4.86 10E6/UL (ref 3.8–5.2)
TRICHOMONAS, WET PREP: NORMAL
TRICYCLICS UR QL SCN: NOT DETECTED
TSH SERPL DL<=0.005 MIU/L-ACNC: 2.34 UIU/ML (ref 0.3–4.2)
WBC # BLD AUTO: 8.3 10E3/UL (ref 4–11)
WBC'S/HIGH POWER FIELD, WET PREP: NORMAL
YEAST, WET PREP: NORMAL

## 2023-08-09 PROCEDURE — 86901 BLOOD TYPING SEROLOGIC RH(D): CPT | Performed by: FAMILY MEDICINE

## 2023-08-09 PROCEDURE — 87210 SMEAR WET MOUNT SALINE/INK: CPT | Performed by: FAMILY MEDICINE

## 2023-08-09 PROCEDURE — 87340 HEPATITIS B SURFACE AG IA: CPT | Performed by: FAMILY MEDICINE

## 2023-08-09 PROCEDURE — 86780 TREPONEMA PALLIDUM: CPT | Performed by: FAMILY MEDICINE

## 2023-08-09 PROCEDURE — 80306 DRUG TEST PRSMV INSTRMNT: CPT | Performed by: FAMILY MEDICINE

## 2023-08-09 PROCEDURE — 85027 COMPLETE CBC AUTOMATED: CPT | Performed by: FAMILY MEDICINE

## 2023-08-09 PROCEDURE — 84443 ASSAY THYROID STIM HORMONE: CPT | Performed by: FAMILY MEDICINE

## 2023-08-09 PROCEDURE — 87591 N.GONORRHOEAE DNA AMP PROB: CPT | Performed by: FAMILY MEDICINE

## 2023-08-09 PROCEDURE — 86850 RBC ANTIBODY SCREEN: CPT | Performed by: FAMILY MEDICINE

## 2023-08-09 PROCEDURE — 86803 HEPATITIS C AB TEST: CPT | Performed by: FAMILY MEDICINE

## 2023-08-09 PROCEDURE — 87491 CHLMYD TRACH DNA AMP PROBE: CPT | Performed by: FAMILY MEDICINE

## 2023-08-09 PROCEDURE — 86762 RUBELLA ANTIBODY: CPT | Performed by: FAMILY MEDICINE

## 2023-08-09 PROCEDURE — 36415 COLL VENOUS BLD VENIPUNCTURE: CPT | Performed by: FAMILY MEDICINE

## 2023-08-09 PROCEDURE — 87086 URINE CULTURE/COLONY COUNT: CPT | Performed by: FAMILY MEDICINE

## 2023-08-09 PROCEDURE — 87389 HIV-1 AG W/HIV-1&-2 AB AG IA: CPT | Performed by: FAMILY MEDICINE

## 2023-08-09 PROCEDURE — 86900 BLOOD TYPING SEROLOGIC ABO: CPT | Performed by: FAMILY MEDICINE

## 2023-08-09 PROCEDURE — 84144 ASSAY OF PROGESTERONE: CPT | Performed by: FAMILY MEDICINE

## 2023-08-09 PROCEDURE — 99207 PR FIRST OB VISIT: CPT | Performed by: FAMILY MEDICINE

## 2023-08-09 ASSESSMENT — PAIN SCALES - GENERAL: PAINLEVEL: NO PAIN (0)

## 2023-08-09 NOTE — PROGRESS NOTES
Have you had or do you currently have:    - Diabetes? NO  - Hypertension? no  - Heart disease, mitral valve prolapse, or rheumatic fever?  no  - An autoimmune disease such as lupus or rheumatoid arthritis?  no  - Kidney disease, urinary tract infection?  no  - Epilepsy, seizures, or spells?  no  - Migraine headaches?  no  - Any other neurological problems?  no  - Have you ever been treated for depression?  no  - Are you having problems with crying spells or loss of self-esteem?  no  - Have you ever required psychiatric care?  no  - Have you ever had hepatitis, liver disease, or jaundice?  no  - Have you ever been treated for blood clots in your veins, deep vein thrombosis, inflammation in the veins, thrombosis, phelbitis, pulmonary embolism or varicosities?  no  - Have you had excessive bleeding after surgery or dental work?  no  - Do you bleed more than other women after a cut or scratch?  no  - Do you have a history or anemia?  no  - Have you ever had thyroid problems or take thyroid medication?  no  - Do you have any endocrine problems?  no  - Have you every been in a major accident or suffered serious trauma?  no  - Within the last year, has anyone hit, slapped, kicked, or otherwise hurt you?  no  - In the last year, has anyone forced you to have sex when you didn't want to?  no  - Have you every received a blood transfusion?  no  - Would you refuse a blood transfusion if a doctor judged it to be medically necessary?  no  - Does anyone in your home smoke? no  - Do you use tobacco products?  no  - Do you drink beer, wine, or hard liquor?  no  - Do you use any of the following: marijuana, speed, cocaine, heroin, hallucinogens, or other drugs?  no  - Is your blood type RH negative?  no  - Have you ever had asthma?  no  - Have you ever had tuberculosis?  no  - Do you have any allergies to drugs or over-the-counter medications?  no  - Allergies: dust mites, aspartame, ethanol, venlafaxine hydrochloride, sertraline?   no  - Have you had any breast problems?  no  - Have you ever breast-fed?  yes  - Have you had any gynecological surgical procedures such as cervical conization, LEEP, laser treatment, cryosurgery of the cervix, or a dilatation and curettage, etc?  no  - Have you ever had any other surgical procedures?  no  - Have you ever had any anesthetic complications?  no  - Have you ever had an abnormal pap smear?  no  - Do you have a history of abnormalities of the uterus? no  - Did your mother take CHANCE or any other hormones when she was pregnant with you?  no  - Did it take you more than one year to become pregnant?  no  - Have you ever been evaluated or treated for infertility?  no  - Is there a history of medical problems in your family, which you feel might adversely affect your health or pregnancy?  no  - Do you have any other problems we have not asked about which you feel may be important to this pregnancy?  no    Symptoms since last menstrual period  - Do you currently have any of the following symptoms: abdominal pain, blood in the stool or urine, chest pain, shortness of breath, coughing or vomiting up blood, you heart is racing or skipping beats, nausea and vomiting, pain on urination, or vaginal discharge or bleeding?  Morning sickness    Genetic screening  Has the patient, baby's father, or anyone in either family had:  - Thalassemia (Italian, Greek, Mediterranean, or  background only) and an MCV result less than 80?  no  - Neural tube defect such as meningomyelocele, spina bifida, or anencephaly?  no  - Congential heart defect?  no  - Down's syndrome?  no  - Amari-Sachs disease ( Lutheran, Cajun, [place])?  no  - Sickle cell disease or trait () ?  no  - Hemophilia or other inherited problems of blood?  no  - Muscular dystrophy?  no  - Cystic fibrosis?  no  - [place]'s chorea?  no  - Mental retardation/autism?  no   If yes, was the person tested for Fragile X?  no  - Any other inherited genetic  or chromosomal disorder?  no  - Maternal metabolic disorder (e.g. insulin- dependent diabetes, PKU)?  no  - A child with birth defects not listed above?  no  - Recurrent pregnancy loss, or a stillbirth?  One miscarriage  - Has the patient had any medications/street drugs/alcohol since her last menstrual period?  no  - Does the patient or baby's father have any other genetic risk?  no    Infection history  - Have you ever been treated for tuberculosis?  no  - Have you every had a positive skin test for tuberculosis?  no  - Do you live with someone who has tuberculosis?  no  - Have you ever been exposed to tuberculosis?  no  - Do you have genital herpes?  no  - Does your partner have genital herpes?  no  - Have you had a rash or viral illness since your last period?  no  - Have you ever had gonorrhea, chlamydia, syphilis, venereal warts, trichomoniasis, pelvic inflammatory disease, or any other sexually transmitted disease?  no  - Have you had chicken pox?  yes  - Have you been vaccinated against chicken pox?  no  - Have you had any other infectious diseases?  No

## 2023-08-10 LAB
C TRACH DNA SPEC QL PROBE+SIG AMP: NEGATIVE
HBV SURFACE AG SERPL QL IA: NONREACTIVE
HCV AB SERPL QL IA: NONREACTIVE
HIV 1+2 AB+HIV1 P24 AG SERPL QL IA: NONREACTIVE
N GONORRHOEA DNA SPEC QL NAA+PROBE: NEGATIVE
PROGEST SERPL-MCNC: 18.2 NG/ML
RUBV IGG SERPL QL IA: 0.61 INDEX
RUBV IGG SERPL QL IA: NORMAL
T PALLIDUM AB SER QL: NONREACTIVE

## 2023-08-10 NOTE — PROGRESS NOTES
Female History and Physical for new ob exam     Viki Busch MRN# [id number]   YOB: 1985 Age: 38 year old     Primary care provider: Ludy Reza                 Chief Complaint:   Has had stressors, mother in law diagnosed with breast cancer recently  History is obtained from the patient         History of Present Illness:   This patient is a 38 year old female who presents for NOB exam             Past Medical History:     Past Medical History:   Diagnosis Date    Abnormal cervical Papanicolaou smear 2010    Herpes zoster without complication 07/2021    right hip, not treated    Infection due to 2019 novel coronavirus 11/2021    PCOS (polycystic ovarian syndrome) 2017    Premenstrual syndrome 2017             Past Surgical History:     Past Surgical History:   Procedure Laterality Date    HC TOOTH EXTRACTION W/FORCEP Bilateral 06/01/2016              Gynecologic History:   Patient's last menstrual period was 05/22/2023 (approximate).          Obstetrical History:   See Epic         Social History:     Social History     Tobacco Use    Smoking status: Former     Packs/day: 0.20     Years: 6.00     Pack years: 1.20     Types: Cigarettes    Smokeless tobacco: Never    Tobacco comments:     quit 2010   Substance Use Topics    Alcohol use: Not Currently     Comment: 2-3x/month             Family History:     Family History   Problem Relation Age of Onset    Thyroid Disease Mother     Hyperlipidemia Father     Family History Negative Sister     Family History Negative Brother     Family History Negative Brother     Other - See Comments Maternal Grandmother         depression    Other - See Comments Maternal Grandfather         old age    Other - See Comments Paternal Grandmother         old age    Other - See Comments Paternal Grandfather         old age             Immunizations:     Immunization History   Administered Date(s) Administered    Influenza Vaccine >6 months (Alfuria,Fluzone)  02/14/2018    TDAP (Adacel,Boostrix) 03/16/2022    TDAP Vaccine (Adacel) 04/07/2017, 12/20/2017            Allergies:   No Known Allergies          Medications:     Current Outpatient Medications:     Prenatal Vit-Fe Fumarate-FA (PRENATAL MULTIVITAMIN W/IRON) 27-0.8 MG tablet, Take 1 tablet by mouth daily, Disp: 90 tablet, Rfl: 3    progesterone (PROMETRIUM) 200 MG capsule, Place 1 capsule (200 mg) vaginally At Bedtime And 1 capsule po at bedtime, Disp: 60 capsule, Rfl: 3    vitamin D3 (CHOLECALCIFEROL) 1.25 MG (97014 UT) capsule, Take 1 capsule (50,000 Units) by mouth every 7 days (Patient not taking: Reported on 6/30/2023), Disp: 12 capsule, Rfl: 0            Review of Systems:     A comprehensive, 10 point review of systems was performed and found to be negative except: as above              Physical Exam:   Vitals were reviewed  Blood pressure 125/68, pulse 75, temperature 98.4  F (36.9  C), temperature source Tympanic, resp. rate 17, weight 76.6 kg (168 lb 14.4 oz), last menstrual period 05/22/2023, SpO2 100 %, currently breastfeeding.  Constitutional:   awake, alert, cooperative, no apparent distress, and appears stated age   Eyes:   Lids and lashes normal, pupils equal, round and reactive to light, extra ocular muscles intact, sclera clear, conjunctiva normal   ENT:   Normocephalic, without obvious abnormality, atramatic, external ears without lesions, oral pharynx with moist mucus membranes, tonsils without erythema or exudates, gums normal and good dentition.   Neck:   Supple, symmetrical, trachea midline, no adenopathy, thyroid symmetric, not enlarged and no tenderness, skin normal   Hematologic / Lymphatic:   no cervical lymphadenopathy   Lungs:   No increased work of breathing, good air exchange, clear to auscultation bilaterally, no crackles or wheezing   Cardiovascular:   Normal apical impulse, regular rate and rhythm, normal S1 and S2, no S3 or S4, and no murmur noted   Abdomen:   No scars, normal  bowel sounds, soft, non-distended, non-tender, no masses palpated, no hepatosplenomegally   Chest / Breast:   Breasts symmetrical, skin without lesion(s), no nipple retraction or dimpling, no nipple discharge, no masses palpated, no axillary or supraclavicular adenopathy   Genitounirinary:   External Genitalia:  General appearance; normal  Urethra:  Fullness absent  Bladder:  Fullness absent, Masses absent, Tenderness absent  Vagina:  General appearance normal  Cervix:  General appearance normal  Uterus:  Size normal, Contour normal, Position normal, for 11 wk GA  Adenexa:  Masses absent, Tenderness absent, Enlargement absent   Musculoskeletal:   There is no redness, warmth, or swelling of the joints.  Full range of motion noted.  Motor strength is 5 out of 5 all extremities bilaterally.  Tone is normal.   Neurologic:   Awake, alert, oriented to name, place and time.  Cranial nerves II-XII are grossly intact.  Motor is 5 out of 5 bilaterally.  Sensory is intact.  and gait is normal.   Neuropsychiatric:   General: normal, calm and normal eye contact  Level of consciousness: alert / normal  Affect: normal and pleasant  Orientation: oriented to self, place, time and situation  Memory and insight: normal, memory for past and recent events intact and thought process normal   Skin:   no bruising or bleeding, Body Locations:  no rashes          Data:     Prenatal Office Visit on 08/09/2023   Component Date Value Ref Range Status    Trichomonas 08/09/2023 Absent  Absent Final    Yeast 08/09/2023 Absent  Absent Final    Clue Cells 08/09/2023 Absent  Absent Final    WBCs/high power field 08/09/2023 None  None Final    WBC Count 08/09/2023 8.3  4.0 - 11.0 10e3/uL Final    RBC Count 08/09/2023 4.86  3.80 - 5.20 10e6/uL Final    Hemoglobin 08/09/2023 14.4  11.7 - 15.7 g/dL Final    Hematocrit 08/09/2023 41.7  35.0 - 47.0 % Final    MCV 08/09/2023 86  78 - 100 fL Final    MCH 08/09/2023 29.6  26.5 - 33.0 pg Final    MCHC  08/09/2023 34.5  31.5 - 36.5 g/dL Final    RDW 08/09/2023 12.3  10.0 - 15.0 % Final    Platelet Count 08/09/2023 271  150 - 450 10e3/uL Final    TSH 08/09/2023 2.34  0.30 - 4.20 uIU/mL Final    Cannabinoids (33-beu-0-carboxy-9-T* 08/09/2023 Not Detected  Not Detected, Indeterminate Final    Cutoff for a negative cannabinoid is 50 ng/mL or less.    Phencyclidine 08/09/2023 Not Detected  Not Detected, Indeterminate Final    Cutoff for a negative PCP is 25 ng/mL or less.    Cocaine (Benzoylecgonine) 08/09/2023 Not Detected  Not Detected, Indeterminate Final    Cutoff for a negative cocaine is 150 ng/ml or less.    Methamphetamine (d-Methamphetamine) 08/09/2023 Not Detected  Not Detected, Indeterminate Final    Cutoff for a negative methamphetamine is 500 ng/ml or less.    Opiates (Morphine) 08/09/2023 Not Detected  Not Detected, Indeterminate Final    Cutoff for a negative opiate is 100 ng/ml or less.    Amphetamine (d-Amphetamine) 08/09/2023 Not Detected  Not Detected, Indeterminate Final    Cutoff for a negative amphetamine is 500 ng/mL or less.    Benzodiazepines (Nordiazepam) 08/09/2023 Not Detected  Not Detected, Indeterminate Final    Cutoff for a negative benzodiazepine is 150 ng/ml or less.    Tricyclic Antidepressants (Desipra* 08/09/2023 Not Detected  Not Detected, Indeterminate Final    Cutoff for a negative tricyclic antidepressant is 300 ng/ml or less.    Methadone 08/09/2023 Not Detected  Not Detected, Indeterminate Final    Cutoff for a negative methadone is 200 ng/ml or less.    Barbiturates (Butalbital) 08/09/2023 Not Detected  Not Detected, Indeterminate Final    Cutoff for a negative barbituate is 200 ng/ml or less.    Oxycodone 08/09/2023 Not Detected  Not Detected, Indeterminate Final    Cutoff for a negative oxycodone is 100 ng/mL or less.    Propoxyphene (Norpropoxyphene) 08/09/2023 Not Detected  Not Detected, Indeterminate Final    Cutoff for a negative propoxyphene is 300 ng/ml or less.     Buprenorphine 08/09/2023 Not Detected  Not Detected, Indeterminate Final    Cutoff for a negative buprenorphine is 10 ng/ml or less.    ABO/RH(D) 08/09/2023 A POS   Final    Antibody Screen 08/09/2023 Negative  Negative Final    SPECIMEN EXPIRATION DATE 08/09/2023 [id number]   Final   ]         Assessment and Plan:     (O09.893) Supervision of other high risk pregnancies, third trimester  (primary encounter diagnosis)  Comment:   Plan: CBC with platelets, Rubella Antibody IgG,         Treponema Abs w Reflex to RPR and Titer,         Hepatitis B surface antigen, Hepatitis C         antibody, HIV Antigen Antibody Combo [place],         Urine Culture, TSH with free T4 reflex, ABO/Rh         type and screen, GC/Chlamydia by PCR - [facility],         Urine Drugs of Abuse Screen (Tox13), Wet prep        Follow-up 4 wks  Declines cell free DNA testing    (R79.89) Low serum progesterone  Comment:   Plan: continue taking, labs pending    (O09.529) AMA (advanced maternal age) multigravida 35+, unspecified trimester  Comment:   Plan: discussed induction at 39 weeks       Patient was agreeable to this plan and had no further questions.        Ludy Reza MD  8/9/2023  7:55 PM

## 2023-08-11 LAB — BACTERIA UR CULT: NORMAL

## 2023-08-16 ENCOUNTER — TELEPHONE (OUTPATIENT)
Dept: FAMILY MEDICINE | Facility: OTHER | Age: 38
End: 2023-08-16

## 2023-08-16 NOTE — TELEPHONE ENCOUNTER
1:24 PM    Reason for Call: Prenatal appointments    Description: Patient was seen last week she has been waiting for a phone call to set up prenatal appointments.     Was an appointment offered for this call? No  If yes : Appointment type              Date    Preferred method for responding to this message: Telephone Call  What is your phone number ? [phone]    If we cannot reach you directly, may we leave a detailed response at the number you provided? Yes    Can this message wait until your PCP/provider returns, if available today? YES, No

## 2023-09-15 ENCOUNTER — PRENATAL OFFICE VISIT (OUTPATIENT)
Dept: FAMILY MEDICINE | Facility: OTHER | Age: 38
End: 2023-09-15
Attending: FAMILY MEDICINE
Payer: COMMERCIAL

## 2023-09-15 VITALS
DIASTOLIC BLOOD PRESSURE: 76 MMHG | WEIGHT: 170.38 LBS | BODY MASS INDEX: 25.91 KG/M2 | HEART RATE: 72 BPM | TEMPERATURE: 97.9 F | SYSTOLIC BLOOD PRESSURE: 119 MMHG | OXYGEN SATURATION: 99 %

## 2023-09-15 DIAGNOSIS — O09.899 SUPERVISION OF OTHER HIGH RISK PREGNANCY, ANTEPARTUM: Primary | ICD-10-CM

## 2023-09-15 DIAGNOSIS — R79.89 LOW SERUM PROGESTERONE: ICD-10-CM

## 2023-09-15 PROCEDURE — 84144 ASSAY OF PROGESTERONE: CPT | Performed by: FAMILY MEDICINE

## 2023-09-15 PROCEDURE — 36415 COLL VENOUS BLD VENIPUNCTURE: CPT | Performed by: FAMILY MEDICINE

## 2023-09-15 PROCEDURE — 99207 PR COMPLICATED OB VISIT: CPT | Performed by: FAMILY MEDICINE

## 2023-09-15 RX ORDER — PRENATAL VIT/IRON FUM/FOLIC AC 27MG-0.8MG
1 TABLET ORAL DAILY
Qty: 90 TABLET | Refills: 3 | Status: SHIPPED | OUTPATIENT
Start: 2023-09-15 | End: 2024-04-11

## 2023-09-15 ASSESSMENT — PAIN SCALES - GENERAL: PAINLEVEL: NO PAIN (0)

## 2023-09-16 LAB — PROGEST SERPL-MCNC: 27.4 NG/ML

## 2023-09-16 NOTE — PROGRESS NOTES
Progesterone today  Doing well  Some stress with mother in law having stage 4 cancer  Follow-up 4 wks

## 2023-10-15 ASSESSMENT — ANXIETY QUESTIONNAIRES
2. NOT BEING ABLE TO STOP OR CONTROL WORRYING: NOT AT ALL
GAD7 TOTAL SCORE: 0
GAD7 TOTAL SCORE: 0
7. FEELING AFRAID AS IF SOMETHING AWFUL MIGHT HAPPEN: NOT AT ALL
6. BECOMING EASILY ANNOYED OR IRRITABLE: NOT AT ALL
5. BEING SO RESTLESS THAT IT IS HARD TO SIT STILL: NOT AT ALL
1. FEELING NERVOUS, ANXIOUS, OR ON EDGE: NOT AT ALL
4. TROUBLE RELAXING: NOT AT ALL
IF YOU CHECKED OFF ANY PROBLEMS ON THIS QUESTIONNAIRE, HOW DIFFICULT HAVE THESE PROBLEMS MADE IT FOR YOU TO DO YOUR WORK, TAKE CARE OF THINGS AT HOME, OR GET ALONG WITH OTHER PEOPLE: NOT DIFFICULT AT ALL
3. WORRYING TOO MUCH ABOUT DIFFERENT THINGS: NOT AT ALL

## 2023-10-15 ASSESSMENT — PATIENT HEALTH QUESTIONNAIRE - PHQ9
SUM OF ALL RESPONSES TO PHQ QUESTIONS 1-9: 0
SUM OF ALL RESPONSES TO PHQ QUESTIONS 1-9: 0
10. IF YOU CHECKED OFF ANY PROBLEMS, HOW DIFFICULT HAVE THESE PROBLEMS MADE IT FOR YOU TO DO YOUR WORK, TAKE CARE OF THINGS AT HOME, OR GET ALONG WITH OTHER PEOPLE: NOT DIFFICULT AT ALL

## 2023-10-16 ENCOUNTER — HOSPITAL ENCOUNTER (OUTPATIENT)
Dept: ULTRASOUND IMAGING | Facility: HOSPITAL | Age: 38
Discharge: HOME | End: 2023-10-16
Attending: FAMILY MEDICINE
Payer: COMMERCIAL

## 2023-10-16 ENCOUNTER — PRENATAL OFFICE VISIT (OUTPATIENT)
Dept: FAMILY MEDICINE | Facility: OTHER | Age: 38
End: 2023-10-16
Attending: FAMILY MEDICINE
Payer: COMMERCIAL

## 2023-10-16 VITALS
OXYGEN SATURATION: 99 % | HEART RATE: 72 BPM | BODY MASS INDEX: 26.37 KG/M2 | SYSTOLIC BLOOD PRESSURE: 106 MMHG | WEIGHT: 173.44 LBS | TEMPERATURE: 97.5 F | DIASTOLIC BLOOD PRESSURE: 70 MMHG

## 2023-10-16 DIAGNOSIS — O09.899 SUPERVISION OF OTHER HIGH RISK PREGNANCY, ANTEPARTUM: Primary | ICD-10-CM

## 2023-10-16 DIAGNOSIS — R79.89 LOW SERUM PROGESTERONE: ICD-10-CM

## 2023-10-16 DIAGNOSIS — O09.529 AMA (ADVANCED MATERNAL AGE) MULTIGRAVIDA 35+, UNSPECIFIED TRIMESTER: ICD-10-CM

## 2023-10-16 DIAGNOSIS — O09.899 SUPERVISION OF OTHER HIGH RISK PREGNANCY, ANTEPARTUM: ICD-10-CM

## 2023-10-16 LAB — PROGEST SERPL-MCNC: 53.1 NG/ML

## 2023-10-16 PROCEDURE — 36415 COLL VENOUS BLD VENIPUNCTURE: CPT | Performed by: FAMILY MEDICINE

## 2023-10-16 PROCEDURE — 99207 PR COMPLICATED OB VISIT: CPT | Performed by: FAMILY MEDICINE

## 2023-10-16 PROCEDURE — 84144 ASSAY OF PROGESTERONE: CPT | Performed by: FAMILY MEDICINE

## 2023-10-16 PROCEDURE — 76805 OB US >/= 14 WKS SNGL FETUS: CPT

## 2023-10-16 ASSESSMENT — PAIN SCALES - GENERAL: PAINLEVEL: NO PAIN (0)

## 2023-10-16 NOTE — Clinical Note
Please eric next visit for gct and cbc and could you call patient to let her know next visit will be her sugar test, thank you!!!
ACP

## 2023-10-16 NOTE — PROGRESS NOTES
Recently loss her mother in law, doing ok with that  Discussed stressors, no contractions though  Education done  Fetal survey today looks good  Next visit gct, cbc  Progesterone level today    Answers submitted by the patient for this visit:  Patient Health Questionnaire (Submitted on 10/15/2023)  If you checked off any problems, how difficult have these problems made it for you to do your work, take care of things at home, or get along with other people?: Not difficult at all  PHQ9 TOTAL SCORE: 0  JEROD-7 (Submitted on 10/15/2023)  JEROD 7 TOTAL SCORE: 0

## 2023-11-01 ENCOUNTER — MYC REFILL (OUTPATIENT)
Dept: FAMILY MEDICINE | Facility: OTHER | Age: 38
End: 2023-11-01

## 2023-11-01 DIAGNOSIS — R79.89 LOW SERUM PROGESTERONE: ICD-10-CM

## 2023-11-02 DIAGNOSIS — R79.89 LOW SERUM PROGESTERONE: ICD-10-CM

## 2023-11-02 RX ORDER — PROGESTERONE 200 MG/1
CAPSULE ORAL
Qty: 60 CAPSULE | Refills: 0 | OUTPATIENT
Start: 2023-11-02

## 2023-11-02 RX ORDER — PROGESTERONE 200 MG/1
200 CAPSULE ORAL AT BEDTIME
Qty: 60 CAPSULE | Refills: 3 | Status: SHIPPED | OUTPATIENT
Start: 2023-11-02 | End: 2023-12-18

## 2023-11-02 NOTE — TELEPHONE ENCOUNTER
Progesterone (PROMETRIUM) 200 mg cap       Last Written Prescription Date:  11/2/23  Last Fill Quantity: 60,   # refills: 3  Last Office Visit: 6/30/23  Future Office visit:    Next 5 appointments (look out 90 days)      Nov 16, 2023  8:30 AM  (Arrive by 8:15 AM)  [facility] [place] [facility] [place] [facility] [address]  [place] [address]  [phone]     Dec 18, 2023  9:00 AM  (Arrive by 8:45 AM)  ESTABLISHED PRENATAL with Ludy Reza MD  [facility] [place] ([facility] [place] ) [address]  [place] [address]  [phone]     Jan 03, 2024  8:45 AM  (Arrive by 8:30 AM)  [facility] [place] [facility] [place] [facility] [address]  [place] [address]  [phone]     Jan 17, 2024  9:00 AM  (Arrive by 8:45 AM)  [facility] [place] [facility] [place] [facility] [address]  [place] [address]  [phone]     Jan 31, 2024  8:45 AM  (Arrive by 8:30 AM)  [facility] [place] [facility] [place] ) [address]  [place] [address]  [phone]

## 2023-11-02 NOTE — TELEPHONE ENCOUNTER
Progesterone 200 mg      Last Written Prescription Date:  6-30-23  Last Fill Quantity: 60,   # refills: 3  Last Office Visit: 10-16-23  Future Office visit:    Next 5 appointments (look out 90 days)      Nov 16, 2023  8:30 AM  (Arrive by 8:15 AM)  [facility] [place] [facility] [place] [facility] [address]  [place] [address]  [phone]     Dec 18, 2023  9:00 AM  (Arrive by 8:45 AM)  [facility] [place] [facility] [place] [facility] [address]  [place] [address]  [phone]     Jan 03, 2024  8:45 AM  (Arrive by 8:30 AM)  [facility] [place] [facility] [place] [facility] [address]  [place] [address]  [phone]     Jan 17, 2024  9:00 AM  (Arrive by 8:45 AM)  [facility] [place] [facility] [place] [facility] [address]  [place] [address]  [phone]     Jan 31, 2024  8:45 AM  (Arrive by 8:30 AM)  ESTABLISHED PRENATAL with Ludy Reza MD  [facility] [place] ([facility] [place] ) [address]  [place] [address]  [phone]

## 2023-11-16 ENCOUNTER — PRENATAL OFFICE VISIT (OUTPATIENT)
Dept: FAMILY MEDICINE | Facility: OTHER | Age: 38
End: 2023-11-16
Attending: FAMILY MEDICINE
Payer: COMMERCIAL

## 2023-11-16 ENCOUNTER — LAB (OUTPATIENT)
Dept: LAB | Facility: OTHER | Age: 38
End: 2023-11-16
Payer: COMMERCIAL

## 2023-11-16 VITALS
TEMPERATURE: 97.7 F | SYSTOLIC BLOOD PRESSURE: 118 MMHG | OXYGEN SATURATION: 98 % | HEIGHT: 68 IN | WEIGHT: 181.7 LBS | BODY MASS INDEX: 27.54 KG/M2 | DIASTOLIC BLOOD PRESSURE: 66 MMHG | HEART RATE: 86 BPM

## 2023-11-16 DIAGNOSIS — O09.529 AMA (ADVANCED MATERNAL AGE) MULTIGRAVIDA 35+, UNSPECIFIED TRIMESTER: ICD-10-CM

## 2023-11-16 DIAGNOSIS — R79.89 LOW SERUM PROGESTERONE: ICD-10-CM

## 2023-11-16 DIAGNOSIS — O09.899 SUPERVISION OF OTHER HIGH RISK PREGNANCY, ANTEPARTUM: Primary | ICD-10-CM

## 2023-11-16 DIAGNOSIS — O09.899 SUPERVISION OF OTHER HIGH RISK PREGNANCY, ANTEPARTUM: ICD-10-CM

## 2023-11-16 LAB
ERYTHROCYTE [DISTWIDTH] IN BLOOD BY AUTOMATED COUNT: 13.4 % (ref 10–15)
GLUCOSE 1H P 50 G GLC PO SERPL-MCNC: 159 MG/DL (ref 70–129)
HCT VFR BLD AUTO: 35.3 % (ref 35–47)
HGB BLD-MCNC: 12.3 G/DL (ref 11.7–15.7)
MCH RBC QN AUTO: 31.2 PG (ref 26.5–33)
MCHC RBC AUTO-ENTMCNC: 34.8 G/DL (ref 31.5–36.5)
MCV RBC AUTO: 90 FL (ref 78–100)
PLATELET # BLD AUTO: 218 10E3/UL (ref 150–450)
RBC # BLD AUTO: 3.94 10E6/UL (ref 3.8–5.2)
WBC # BLD AUTO: 9.6 10E3/UL (ref 4–11)

## 2023-11-16 PROCEDURE — 82950 GLUCOSE TEST: CPT

## 2023-11-16 PROCEDURE — 84144 ASSAY OF PROGESTERONE: CPT

## 2023-11-16 PROCEDURE — 36415 COLL VENOUS BLD VENIPUNCTURE: CPT

## 2023-11-16 PROCEDURE — 99207 PR COMPLICATED OB VISIT: CPT | Performed by: FAMILY MEDICINE

## 2023-11-16 PROCEDURE — 85027 COMPLETE CBC AUTOMATED: CPT

## 2023-11-16 ASSESSMENT — PAIN SCALES - GENERAL: PAINLEVEL: NO PAIN (0)

## 2023-11-16 NOTE — PROGRESS NOTES
Doing well  Education done  Some trouble with sleep lately, toddler has been up and fussy  Doing ok with loss of mother in law  Some B-H but nothing regular

## 2023-11-17 LAB — PROGEST SERPL-MCNC: 68.4 NG/ML

## 2023-11-17 NOTE — RESULT ENCOUNTER NOTE
Did she get diet to follow for 3 hr gtt?  Progesterone is improving but still low, continue prometrium

## 2023-11-20 ENCOUNTER — LAB (OUTPATIENT)
Dept: LAB | Facility: OTHER | Age: 38
End: 2023-11-20
Payer: COMMERCIAL

## 2023-11-20 DIAGNOSIS — O09.899 SUPERVISION OF OTHER HIGH RISK PREGNANCY, ANTEPARTUM: ICD-10-CM

## 2023-11-20 DIAGNOSIS — O24.419 GESTATIONAL DIABETES MELLITUS (GDM) IN SECOND TRIMESTER, GESTATIONAL DIABETES METHOD OF CONTROL UNSPECIFIED: Primary | ICD-10-CM

## 2023-11-20 LAB
GESTATIONAL GTT 1 HR POST DOSE: 161 MG/DL (ref 60–179)
GESTATIONAL GTT 2 HR POST DOSE: 167 MG/DL (ref 60–154)
GESTATIONAL GTT 3 HR POST DOSE: 137 MG/DL (ref 60–139)
GLUCOSE P FAST SERPL-MCNC: 98 MG/DL (ref 60–94)

## 2023-11-20 PROCEDURE — 82952 GTT-ADDED SAMPLES: CPT

## 2023-11-20 PROCEDURE — 36415 COLL VENOUS BLD VENIPUNCTURE: CPT

## 2023-11-20 PROCEDURE — 82951 GLUCOSE TOLERANCE TEST (GTT): CPT

## 2023-11-27 ENCOUNTER — HOSPITAL ENCOUNTER (OUTPATIENT)
Dept: EDUCATION SERVICES | Facility: HOSPITAL | Age: 38
Discharge: HOME | End: 2023-11-27
Attending: DIETITIAN, REGISTERED | Admitting: DIETITIAN, REGISTERED
Payer: COMMERCIAL

## 2023-11-27 VITALS
BODY MASS INDEX: 27.15 KG/M2 | WEIGHT: 183.3 LBS | DIASTOLIC BLOOD PRESSURE: 75 MMHG | HEIGHT: 69 IN | SYSTOLIC BLOOD PRESSURE: 113 MMHG | RESPIRATION RATE: 16 BRPM | HEART RATE: 82 BPM | OXYGEN SATURATION: 97 %

## 2023-11-27 DIAGNOSIS — O24.410 DIET CONTROLLED GESTATIONAL DIABETES MELLITUS (GDM) IN SECOND TRIMESTER: Primary | ICD-10-CM

## 2023-11-27 PROCEDURE — G0108 DIAB MANAGE TRN  PER INDIV: HCPCS

## 2023-11-27 RX ORDER — BLOOD SUGAR DIAGNOSTIC
STRIP MISCELLANEOUS
Qty: 200 STRIP | Refills: 4 | Status: ON HOLD | OUTPATIENT
Start: 2023-11-27 | End: 2024-01-03

## 2023-11-27 RX ORDER — LANCETS
EACH MISCELLANEOUS
Qty: 200 EACH | Refills: 4 | Status: SHIPPED | OUTPATIENT
Start: 2023-11-27 | End: 2024-01-03

## 2023-11-27 RX ORDER — BLOOD-GLUCOSE METER
1 EACH MISCELLANEOUS 4 TIMES DAILY
Qty: 1 KIT | Refills: 0 | Status: SHIPPED | OUTPATIENT
Start: 2023-11-27 | End: 2024-01-03

## 2023-11-27 ASSESSMENT — PAIN SCALES - GENERAL: PAINLEVEL: NO PAIN (0)

## 2023-11-27 NOTE — PATIENT INSTRUCTIONS
Recap of our visit:     Monitoring:    Test your blood sugar 4 times daily-  Fasting and 1 hour after each meal    Target Blood Sugar Readings    Fasting < 90  1 hour after each meal  <140    If you have 3 days in a row that your blood sugar numbers are above target you need to call your OB/GYN and/or the [facility].     Medications:   None at this time.     Healthy Eating:      diabetesfoodhub.org     Mediterranean Lifestyle: lean meat, vegetables, fruit, nuts, legumes, whole grains, water, regular activity.    Eat a Healthy diet  Eat more vegetables/plants in your diet  Eat healthy fats  Olive oil  Avocados  Eat healthy proteins  Poultry without the skin  Limit red meat     Limit higher carbohydrate foods such as: rice, breads, cereal, pasta, sweets.   Avoid sugary drinks: regular soda/pop, juice, sports drinks. (Sugar free, zero are okay).     Snacks: Try to  work on healthy, low carbohydrate food choices.   Good snack examples: meat, cheese, cottage cheese, nuts, hard boiled egg, veggies, fruit/berries, yogurt (Greek yogurt/protein).     Target Carb:   Breakfast 30-45 grams/meal, 45 grams lunch/dinner.   15-30 grams carbs optional snack.     Suggestion- pair protein with carb option      Activity/Exercise:       Any prolonged sedentary/sitting activity should be interrupted every 30 minutes   Try to walk or be active 5-10 minutes after eating meals     Target 30 minutes most days of the week- light activity.     Healthy Coping:     Practicing health promotion can help improve diabetes (suggested by the ADA, March 2019)              Meditation                          Apps: for IPhone and Android                          -Calm                          -Headspace                          -Insight Timer              Yoga              Mindful eating       Follow up: weekly via Stack[employer]pt with glucose/sugar readings.   Please bring your meter/reader to your appointment.     If you have any questions or  concerns, please call me at [phone] (Uzma, Nurse directly) or send CatchMe! message.    Scheduling Number: [phone]    Thank you for coming in today!  Alicia Villanueva RN Diabetes Educator

## 2023-11-27 NOTE — PROGRESS NOTES
Diabetes Self-Management Education & Support  Type of Service: In Person Visit    SUBJECTIVE/OBJECTIVE:  Presents for education related to gestational diabetes.     Cultural Influences/Ethnic Background:  Not  or     Estimated Date of Delivery: Mar 2, 2024    1 hour OGTT  Lab Results   Component Value Date    GLU1 159 (H) 11/16/2023       3 hour OGTT    Fasting  Lab Results   Component Value Date    GTTGF 98 (H) 11/20/2023       1 hour  Lab Results   Component Value Date    GTTG1 161 11/20/2023       2 hour  Lab Results   Component Value Date    GTTG2 167 (H) 11/20/2023       3 hour  Lab Results   Component Value Date    GTTG3 137 11/20/2023       Lifestyle and Health Behaviors:  Barrier to exercise: Time  Cultural/Islam diet restrictions?: No  Meal planning/habits: None  How many times a week on average do you eat food made away from home (restaurant/take-out)?: 3  Meals include: Breakfast, Lunch, Dinner  Beverages: Water, Tea, Milk    Healthy Coping:  Informal Support system:: Children, Verena based, Family, Friends, Neighbors, Parent, Spouse    Current Management:  None- new.     ASSESSMENT:  Viki, 38 year old: 26 w 2 d. LUCINDA 3/2/24. Will be baby #5 (6th pregnancy).   Deferred finding out gender until birth.   With 1 prior pregnancy, was monitoring glucose for a about a month and was decided she didn't have GDM.   GTT: 2 results above target.   Support- family.   NKA  Activity- no regular exercise. Manages housework and has 2 smaller children to care for.     Wt Readings from Last 10 Encounters:   11/27/23 83.1 kg (183 lb 4.8 oz)   11/16/23 82.4 kg (181 lb 11.2 oz)   10/16/23 78.7 kg (173 lb 7 oz)   09/15/23 77.3 kg (170 lb 6 oz)   08/09/23 76.6 kg (168 lb 14.4 oz)   06/30/23 76.7 kg (169 lb 3.2 oz)   05/27/22 86.2 kg (190 lb)   05/23/22 86.5 kg (190 lb 12.8 oz)   05/18/22 85.3 kg (188 lb)   05/17/22 84.8 kg (187 lb)     INTERVENTION:  Patient was instructed on Accu-Chek Guide Me meter using a  demo kit. Orders sent to preferred pharmacy.      Educational topics covered today:  GDM diagnosis, pathophysiology, Risks and Complications of GDM, Means of controlling GDM, Using a Blood Glucose Monitor, Blood Glucose Goals, Logging and Interpreting Glucose Results, When to Call a Diabetes Educator or OB Provider, Healthy Eating During Pregnancy, Counting Carbohydrates, Meal Planning for GDM, and Physical Activity    Educational materials provided today:   [place] Understanding Gestational Diabetes  GDM Log sheet  Sharps Disposal  Care After Delivery  Accu-Chek Guide Me meter kit    Pt verbalized understanding of concepts discussed and recommendations provided today.     PLAN:  Check glucose 4 times daily, before breakfast and 1 hour after each meal.  Send report weekly via Abcellute.   FBG <95 PP 1 hour <140.     Physical activity recommended: 30 minutes. Light, non strenuous. Start shorter increments, increase as tolerated. Okay to break up into multiple times throughout the day.      Meal plan: 30-45 carbs at breakfast, 45 carbs at lunch, 45 carbs at supper, 15-30 carbs at 3 snacks a day.  Follow consistent CHO meal plan, eat CHO and protein/fat at all meals/snacks.    Call/Dealisedhart message diabetes educator or OB if 3 or more blood sugars are above the goal in 1 week or with questions/concerns.    Time Spent: 60 minutes  Encounter Type: Individual    Any diabetes medication dose changes were made via the CDE Protocol and Collaborative Practice Agreement with the patient's primary care provider and OB/GYN provider. A copy of this encounter was shared with the provider.    Alicia Villanueva RN Diabetes Educator,  [phone]  11/27/2023 at 11:38 AM

## 2023-12-11 DIAGNOSIS — O24.414 INSULIN CONTROLLED GESTATIONAL DIABETES MELLITUS (GDM) IN THIRD TRIMESTER: Primary | ICD-10-CM

## 2023-12-11 NOTE — TELEPHONE ENCOUNTER
S-(situation): FBG above target    B-(background):   28 w 2 days LUCINDA 3/2/2024.   GDM- education 2023.     A-(assessment):   FB% in target  1 hour PP Breakfast:  43% in target  PP lunch: 43% in target  PP dinner: 71% in target     R-(recommendations):   Start NPH 10 units at bedtime. Increase per FBG trends. New orders attached for consideration. May need P.A.   Consider meal time with breakfast and lunch- will wait with starting NPH first.   Pt to come into clinic for insulin teaching.     Alicia Villanueva RN Diabetes Educator,  [phone]  2023 at 9:42 AM

## 2023-12-14 ENCOUNTER — HOSPITAL ENCOUNTER (OUTPATIENT)
Dept: EDUCATION SERVICES | Facility: HOSPITAL | Age: 38
Discharge: HOME | End: 2023-12-14
Attending: NURSE PRACTITIONER | Admitting: NURSE PRACTITIONER
Payer: COMMERCIAL

## 2023-12-14 VITALS
DIASTOLIC BLOOD PRESSURE: 75 MMHG | RESPIRATION RATE: 16 BRPM | HEART RATE: 71 BPM | BODY MASS INDEX: 27.47 KG/M2 | HEIGHT: 69 IN | WEIGHT: 185.5 LBS | OXYGEN SATURATION: 99 % | SYSTOLIC BLOOD PRESSURE: 119 MMHG

## 2023-12-14 PROCEDURE — G0108 DIAB MANAGE TRN  PER INDIV: HCPCS

## 2023-12-14 ASSESSMENT — PAIN SCALES - GENERAL: PAINLEVEL: NO PAIN (0)

## 2023-12-14 NOTE — PROGRESS NOTES
"Diabetes Self-Management Education & Support  Type of Service: In Person Visit    SUBJECTIVE/OBJECTIVE:  Presents for education related to gestational diabetes.  Insulin start FBG above target 100%.     Cultural Influences/Ethnic Background:  Not  or     /75   Pulse 71   Resp 16   Ht 1.746 m (5' 8.75\")   Wt 84.1 kg (185 lb 8 oz)   LMP 05/22/2023 (Approximate)   SpO2 99%   BMI 27.59 kg/m      28 w 5 d gestation.    Wt Readings from Last 10 Encounters:   12/14/23 84.1 kg (185 lb 8 oz)   11/27/23 83.1 kg (183 lb 4.8 oz)   11/16/23 82.4 kg (181 lb 11.2 oz)   10/16/23 78.7 kg (173 lb 7 oz)   09/15/23 77.3 kg (170 lb 6 oz)   08/09/23 76.6 kg (168 lb 14.4 oz)   06/30/23 76.7 kg (169 lb 3.2 oz)   05/27/22 86.2 kg (190 lb)   05/23/22 86.5 kg (190 lb 12.8 oz)   05/18/22 85.3 kg (188 lb)     Estimated Date of Delivery: Mar 2, 2024    Blood Glucose:   Date Fasting Post Breakfast Post Lunch Post Supper   12/14 117 129       12/13 97 118 115 103   12/12 110 121 126 122   12/11 112 141 134    12/10 102   177 124   12/9 111 144 144 117   12/8 102 129 182       12/7 108 94 183 125   12/6 124 131 116 183   12/5 108 159 109 143   12/4 103 119 112 134   12/3 119 213 - 112   12/2 116 156 140 101   12/1 106 132 159 123     Lifestyle and Health Behaviors:   Working on diet changes. Mostly salads. Lower carb meal options.     Current Management:  Diet  Activity.   Starting NPH this HS.      ASSESSMENT:  Fasting blood glucoses: 0% in target.  After breakfast: 29% in target.  After lunch: 43% in target.  After dinner: 29% in target.    PP are improving with diet modifications.     INTERVENTION:  Educational topics covered today:  My insulin plan handout: action, administration using demo supplies, storage, disposal, s/s of low bg and treatment.     Educational Materials provided today:  [place] Preventing Diabetes    PLAN:  Check glucose 4 times daily and prn.  Continue with recommended physical " activity.  Continue to follow recommended meal plan: 30-45 carbs at breakfast, 45 carbs at lunch, 45 carbs at supper, 15-30 carbs at snacks.  Follow consistent CHO meal plan, eat CHO and protein/fat at all meals/snacks.    Call/MyChart message diabetes educator if 3 or more blood sugars are above the goal in 1 week.     Time Spent: 30 minutes  Encounter Type: Individual    Any diabetes medication dose changes were made via the CDE Protocol and Collaborative Practice Agreement with the patient's primary care provider and OB/GYN provider. A copy of this encounter was shared with the provider.   Alicia Villanueva RN Diabetes Educator,  [phone]  12/14/2023 at 4:04 PM

## 2023-12-18 ENCOUNTER — PRENATAL OFFICE VISIT (OUTPATIENT)
Dept: FAMILY MEDICINE | Facility: OTHER | Age: 38
End: 2023-12-18
Attending: FAMILY MEDICINE
Payer: COMMERCIAL

## 2023-12-18 VITALS
WEIGHT: 187.31 LBS | DIASTOLIC BLOOD PRESSURE: 69 MMHG | TEMPERATURE: 97.6 F | HEART RATE: 71 BPM | BODY MASS INDEX: 27.86 KG/M2 | OXYGEN SATURATION: 100 % | SYSTOLIC BLOOD PRESSURE: 118 MMHG

## 2023-12-18 DIAGNOSIS — R79.89 LOW SERUM PROGESTERONE: ICD-10-CM

## 2023-12-18 DIAGNOSIS — O24.414 INSULIN CONTROLLED GESTATIONAL DIABETES MELLITUS (GDM) IN SECOND TRIMESTER: ICD-10-CM

## 2023-12-18 DIAGNOSIS — O09.899 SUPERVISION OF OTHER HIGH RISK PREGNANCY, ANTEPARTUM: Primary | ICD-10-CM

## 2023-12-18 PROCEDURE — 99207 PR COMPLICATED OB VISIT: CPT | Performed by: FAMILY MEDICINE

## 2023-12-18 RX ORDER — PROGESTERONE 200 MG/1
200 CAPSULE ORAL AT BEDTIME
Qty: 60 CAPSULE | Refills: 3 | Status: ON HOLD | OUTPATIENT
Start: 2023-12-18 | End: 2024-01-24

## 2023-12-18 ASSESSMENT — PAIN SCALES - GENERAL: PAINLEVEL: NO PAIN (0)

## 2023-12-18 NOTE — PROGRESS NOTES
Reviewed blood sugars, insulin was increased by Alicia, agreed  She is working hard on changing diet  Discussed BPP and NSTs  Scheduled induction for 2/19/23 for GDM  Education done

## 2023-12-19 ENCOUNTER — LAB (OUTPATIENT)
Dept: LAB | Facility: OTHER | Age: 38
End: 2023-12-19
Payer: COMMERCIAL

## 2023-12-19 DIAGNOSIS — R79.89 LOW SERUM PROGESTERONE: ICD-10-CM

## 2023-12-19 PROCEDURE — 84144 ASSAY OF PROGESTERONE: CPT

## 2023-12-19 PROCEDURE — 36415 COLL VENOUS BLD VENIPUNCTURE: CPT

## 2023-12-20 LAB — PROGEST SERPL-MCNC: 102 NG/ML

## 2024-01-03 ENCOUNTER — PRENATAL OFFICE VISIT (OUTPATIENT)
Dept: FAMILY MEDICINE | Facility: OTHER | Age: 39
End: 2024-01-03
Attending: FAMILY MEDICINE
Payer: COMMERCIAL

## 2024-01-03 ENCOUNTER — HOSPITAL ENCOUNTER (OUTPATIENT)
Dept: ULTRASOUND IMAGING | Facility: HOSPITAL | Age: 39
Discharge: HOME | End: 2024-01-03
Attending: FAMILY MEDICINE
Payer: COMMERCIAL

## 2024-01-03 ENCOUNTER — HOSPITAL ENCOUNTER (OUTPATIENT)
Facility: HOSPITAL | Age: 39
Discharge: HOME | End: 2024-01-03
Attending: FAMILY MEDICINE | Admitting: FAMILY MEDICINE
Payer: COMMERCIAL

## 2024-01-03 VITALS
TEMPERATURE: 97.6 F | HEART RATE: 74 BPM | SYSTOLIC BLOOD PRESSURE: 124 MMHG | DIASTOLIC BLOOD PRESSURE: 64 MMHG | RESPIRATION RATE: 18 BRPM

## 2024-01-03 VITALS
SYSTOLIC BLOOD PRESSURE: 120 MMHG | HEART RATE: 80 BPM | TEMPERATURE: 97.7 F | OXYGEN SATURATION: 100 % | WEIGHT: 184.9 LBS | DIASTOLIC BLOOD PRESSURE: 70 MMHG | BODY MASS INDEX: 27.5 KG/M2

## 2024-01-03 DIAGNOSIS — O24.414 INSULIN CONTROLLED GESTATIONAL DIABETES MELLITUS (GDM) IN THIRD TRIMESTER: ICD-10-CM

## 2024-01-03 DIAGNOSIS — O09.529 AMA (ADVANCED MATERNAL AGE) MULTIGRAVIDA 35+, UNSPECIFIED TRIMESTER: ICD-10-CM

## 2024-01-03 DIAGNOSIS — O09.899 SUPERVISION OF OTHER HIGH RISK PREGNANCY, ANTEPARTUM: Primary | ICD-10-CM

## 2024-01-03 DIAGNOSIS — O24.414 INSULIN CONTROLLED GESTATIONAL DIABETES MELLITUS (GDM) IN SECOND TRIMESTER: ICD-10-CM

## 2024-01-03 DIAGNOSIS — R79.89 LOW SERUM PROGESTERONE: ICD-10-CM

## 2024-01-03 PROCEDURE — 76819 FETAL BIOPHYS PROFIL W/O NST: CPT

## 2024-01-03 PROCEDURE — 59025 FETAL NON-STRESS TEST: CPT | Mod: 26 | Performed by: FAMILY MEDICINE

## 2024-01-03 PROCEDURE — 84144 ASSAY OF PROGESTERONE: CPT | Performed by: FAMILY MEDICINE

## 2024-01-03 PROCEDURE — 36415 COLL VENOUS BLD VENIPUNCTURE: CPT | Performed by: FAMILY MEDICINE

## 2024-01-03 PROCEDURE — 99207 PR COMPLICATED OB VISIT: CPT | Performed by: FAMILY MEDICINE

## 2024-01-03 PROCEDURE — 59025 FETAL NON-STRESS TEST: CPT | Mod: 59

## 2024-01-03 PROCEDURE — G0463 HOSPITAL OUTPT CLINIC VISIT: HCPCS

## 2024-01-03 RX ORDER — HYDROXYZINE PAMOATE 25 MG/1
25-50 CAPSULE ORAL
Qty: 40 CAPSULE | Refills: 1 | Status: ON HOLD | OUTPATIENT
Start: 2024-01-03 | End: 2024-02-29

## 2024-01-03 ASSESSMENT — PAIN SCALES - GENERAL: PAINLEVEL: NO PAIN (0)

## 2024-01-03 NOTE — PROGRESS NOTES
Increased insulin to 18 units  Sleep environment, 4 yo dtr and 2 dogs in her bed, waking frequently  Re-adjusted dates based on date of conception and early ultrasound, induction scheduled for 2/26/24  Weekly bpp and NST for next month and then bi-weekly  Education done  Declines rsv and tdap  She is watching her diet more so now  Progesterone check today  Will get her set up for level 2 ultrasound

## 2024-01-03 NOTE — Clinical Note
Level 2 ultrasound that needs to go to [facility] or the [facility] as we need in the next 2-3 weeks, please check with kacy if questions, thx

## 2024-01-04 ENCOUNTER — MYC MEDICAL ADVICE (OUTPATIENT)
Dept: FAMILY MEDICINE | Facility: OTHER | Age: 39
End: 2024-01-04

## 2024-01-04 DIAGNOSIS — L29.9 PRURITIC DISORDER: Primary | ICD-10-CM

## 2024-01-05 LAB — PROGEST SERPL-MCNC: 109.9 NG/ML

## 2024-01-05 NOTE — TELEPHONE ENCOUNTER
"1/5/2024 8:54 AM  Writer called pt. Pt denies yellowing of skin or eyes. Pt reports mild itching under breast, ribcage. Pt will try benadryl and hydrocortisone cream. Pt offered overbook if needed. At this time pt reports she will try cream and benadryl first and let us know if she believes she should be seen. Pt stated, \"I believe it's dry skin, but I wanted to ask\". Pt denies any other sx or concerns.   Pt reports never having cats prior. Pt did sleep in daughter's bed after cat slept in the same bed. Daughter took cat back to her home, cat no longer in patient's house.   Shweta Funk RN      "

## 2024-01-06 NOTE — TELEPHONE ENCOUNTER
Discussed with patient  History of sensitive skin, takes hot showers, some erythema on itching skin  Better with hydrocortisone cream, minimal itching today  Come Monday for labs and in ER this weekend if s/sx discussed to watch for that would alert her she needs to be seen and/or if sugars get crazy also.  Patient agreeable to this plan, questions answered

## 2024-01-08 ENCOUNTER — LAB (OUTPATIENT)
Dept: LAB | Facility: OTHER | Age: 39
End: 2024-01-08
Payer: COMMERCIAL

## 2024-01-08 ENCOUNTER — MYC REFILL (OUTPATIENT)
Dept: FAMILY MEDICINE | Facility: OTHER | Age: 39
End: 2024-01-08

## 2024-01-08 DIAGNOSIS — L29.9 PRURITIC DISORDER: ICD-10-CM

## 2024-01-08 DIAGNOSIS — O09.899 SUPERVISION OF OTHER HIGH RISK PREGNANCY, ANTEPARTUM: ICD-10-CM

## 2024-01-08 DIAGNOSIS — O09.529 AMA (ADVANCED MATERNAL AGE) MULTIGRAVIDA 35+, UNSPECIFIED TRIMESTER: ICD-10-CM

## 2024-01-08 DIAGNOSIS — O24.414 INSULIN CONTROLLED GESTATIONAL DIABETES MELLITUS (GDM) IN THIRD TRIMESTER: ICD-10-CM

## 2024-01-08 LAB
ALBUMIN SERPL BCG-MCNC: 3.8 G/DL (ref 3.5–5.2)
ALP SERPL-CCNC: 79 U/L (ref 40–150)
ALT SERPL W P-5'-P-CCNC: 13 U/L (ref 0–50)
ANION GAP SERPL CALCULATED.3IONS-SCNC: 10 MMOL/L (ref 7–15)
AST SERPL W P-5'-P-CCNC: 17 U/L (ref 0–45)
BILIRUB SERPL-MCNC: 0.4 MG/DL
BUN SERPL-MCNC: 14.3 MG/DL (ref 6–20)
CALCIUM SERPL-MCNC: 8.9 MG/DL (ref 8.6–10)
CHLORIDE SERPL-SCNC: 105 MMOL/L (ref 98–107)
CREAT SERPL-MCNC: 0.91 MG/DL (ref 0.51–0.95)
DEPRECATED HCO3 PLAS-SCNC: 21 MMOL/L (ref 22–29)
EGFRCR SERPLBLD CKD-EPI 2021: 82 ML/MIN/1.73M2
GLUCOSE SERPL-MCNC: 92 MG/DL (ref 70–99)
POTASSIUM SERPL-SCNC: 3.7 MMOL/L (ref 3.4–5.3)
PROT SERPL-MCNC: 6.4 G/DL (ref 6.4–8.3)
SODIUM SERPL-SCNC: 136 MMOL/L (ref 135–145)

## 2024-01-08 PROCEDURE — 83789 MASS SPECTROMETRY QUAL/QUAN: CPT | Mod: 90

## 2024-01-08 PROCEDURE — 80053 COMPREHEN METABOLIC PANEL: CPT

## 2024-01-08 PROCEDURE — 36415 COLL VENOUS BLD VENIPUNCTURE: CPT

## 2024-01-08 NOTE — TELEPHONE ENCOUNTER
Disp Refills Start End ABELARDO   hydrOXYzine aakash (VISTARIL) 25 MG capsule 40 capsule 1 1/3/2024 -- No     Last Office Visit: 11/16/2023  Future Office visit:    Next 5 appointments (look out 90 days)      Jan 17, 2024  9:00 AM  (Arrive by 8:45 AM)  [facility] [place] ([facility] [place] ) [address]  [place] [address]  [phone]     Jan 31, 2024  8:45 AM  (Arrive by 8:30 AM)  ESTABLISHED PRENATAL with Ludy Reza MD  [facility] [place] ([facility] [place] ) [address]  [place] [address]  [phone]     Feb 07, 2024  3:45 PM  (Arrive by 3:30 PM)  ESTABLISHED PRENATAL with Ludy Reza MD  [facility] [place] ([facility] [place] ) [address]  [place] [address]  [phone]     Feb 14, 2024  9:00 AM  (Arrive by 8:45 AM)  [facility]ABL[facility] [place] [facility] [place] ) [address]  [place] [address]  [phone]     Feb 21, 2024  8:45 AM  (Arrive by 8:30 AM)  ESTABLISHED PRENATAL with Ludy Reza MD  [facility] [place] ([facility] [place] ) [address]  [place] [address]  [phone]             Routing refill request to provider for review/approval because:

## 2024-01-09 RX ORDER — HYDROXYZINE PAMOATE 25 MG/1
25-50 CAPSULE ORAL
Qty: 40 CAPSULE | Refills: 1 | OUTPATIENT
Start: 2024-01-09

## 2024-01-10 ENCOUNTER — HOSPITAL ENCOUNTER (OUTPATIENT)
Facility: HOSPITAL | Age: 39
Discharge: HOME | End: 2024-01-10
Attending: FAMILY MEDICINE | Admitting: FAMILY MEDICINE
Payer: COMMERCIAL

## 2024-01-10 ENCOUNTER — HOSPITAL ENCOUNTER (OUTPATIENT)
Dept: ULTRASOUND IMAGING | Facility: HOSPITAL | Age: 39
Discharge: HOME | End: 2024-01-10
Attending: FAMILY MEDICINE | Admitting: FAMILY MEDICINE
Payer: COMMERCIAL

## 2024-01-10 VITALS
DIASTOLIC BLOOD PRESSURE: 65 MMHG | RESPIRATION RATE: 18 BRPM | SYSTOLIC BLOOD PRESSURE: 118 MMHG | HEART RATE: 76 BPM | TEMPERATURE: 98.7 F | OXYGEN SATURATION: 100 %

## 2024-01-10 DIAGNOSIS — O24.414 INSULIN CONTROLLED GESTATIONAL DIABETES MELLITUS (GDM) IN SECOND TRIMESTER: ICD-10-CM

## 2024-01-10 PROCEDURE — G0463 HOSPITAL OUTPT CLINIC VISIT: HCPCS | Mod: 25

## 2024-01-10 PROCEDURE — 76819 FETAL BIOPHYS PROFIL W/O NST: CPT

## 2024-01-10 PROCEDURE — 59025 FETAL NON-STRESS TEST: CPT

## 2024-01-10 PROCEDURE — 59025 FETAL NON-STRESS TEST: CPT | Mod: 26 | Performed by: FAMILY MEDICINE

## 2024-01-10 NOTE — DISCHARGE INSTRUCTIONS
Discharge Instructions for Undelivered Patients    Diet:  * Drink 8 to 12 glasses of liquids (milk, juice, water) every day  * You may eat meals and snacks.    Activity:  * Count fetal kicks every day.  * Call your doctor if your baby is moving less than usual.    Call your provider if you notice:  * Swelling in your face or increased swelling in your hands or legs.  * Headaches that are not relieved by Tylenol (acetaminophen).  * Changes in your vision (blurring; seeing spots or stars).  * Nausea (sick to your stomach) and vomiting (throwing up).  * Weight gain of 5 pounds per week.  * Heartburn that doesn't go away.  * Signs of bladder infection: Pain when you urinate (use the toilet), needing to go more often or more urgently.  * The bag of peters (membrane) breaks, or you notice leaking in your underwear.  * Bright red blood in your underwear.  * Abdominal (lower belly) or stomach pain.  * Second (plus) baby: Contractions (tightenings) less than 10 minutes apart and getting stronger.  * Increase or change in vaginal discharge (note the color and amount).    [facility]: [phone]

## 2024-01-10 NOTE — PLAN OF CARE
Fetal Non-Stress Test Results    NST Ordered By: Dr. Reza  NST Medical Indication: GDM    NST Start & Stop Times  NST Start Time: 0730  NST Stop Time: 0750                            NST Results  Fetus A   Baseline Rate: 135  Accelerations: Present  Decelerations: None  Interpretation: reactive    Verified with CARMEN Rivera. Verbal orders obtained to discharge patient. AVS given, patient verbalizes understanding. Discharged home at 0800.

## 2024-01-13 LAB
BILE AC SERPL-SCNC: 2.4 UMOL/L
CDCAE SERPL-SCNC: 0.9 UMOL/L
CHOLATE SERPL-SCNC: 0.5 UMOL/L
DO-CHOLATE SERPL-SCNC: 0.7 UMOL/L
URSODEOXYCHOLATE SERPL-SCNC: 0.3 UMOL/L

## 2024-01-17 ENCOUNTER — PRENATAL OFFICE VISIT (OUTPATIENT)
Dept: FAMILY MEDICINE | Facility: OTHER | Age: 39
End: 2024-01-17
Attending: FAMILY MEDICINE
Payer: COMMERCIAL

## 2024-01-17 ENCOUNTER — HOSPITAL ENCOUNTER (OUTPATIENT)
Dept: ULTRASOUND IMAGING | Facility: HOSPITAL | Age: 39
Discharge: HOME | End: 2024-01-17
Attending: FAMILY MEDICINE
Payer: COMMERCIAL

## 2024-01-17 ENCOUNTER — HOSPITAL ENCOUNTER (OUTPATIENT)
Facility: HOSPITAL | Age: 39
Discharge: HOME | End: 2024-01-17
Attending: FAMILY MEDICINE | Admitting: FAMILY MEDICINE
Payer: COMMERCIAL

## 2024-01-17 VITALS
SYSTOLIC BLOOD PRESSURE: 121 MMHG | RESPIRATION RATE: 16 BRPM | OXYGEN SATURATION: 98 % | TEMPERATURE: 97.4 F | DIASTOLIC BLOOD PRESSURE: 69 MMHG

## 2024-01-17 VITALS
WEIGHT: 187.4 LBS | TEMPERATURE: 97.5 F | HEART RATE: 82 BPM | OXYGEN SATURATION: 100 % | BODY MASS INDEX: 27.88 KG/M2 | DIASTOLIC BLOOD PRESSURE: 62 MMHG | SYSTOLIC BLOOD PRESSURE: 110 MMHG

## 2024-01-17 DIAGNOSIS — R79.89 LOW SERUM PROGESTERONE: ICD-10-CM

## 2024-01-17 DIAGNOSIS — O09.899 SUPERVISION OF OTHER HIGH RISK PREGNANCY, ANTEPARTUM: Primary | ICD-10-CM

## 2024-01-17 DIAGNOSIS — O09.529 AMA (ADVANCED MATERNAL AGE) MULTIGRAVIDA 35+, UNSPECIFIED TRIMESTER: ICD-10-CM

## 2024-01-17 DIAGNOSIS — O24.414 INSULIN CONTROLLED GESTATIONAL DIABETES MELLITUS (GDM) IN THIRD TRIMESTER: ICD-10-CM

## 2024-01-17 DIAGNOSIS — O24.414 INSULIN CONTROLLED GESTATIONAL DIABETES MELLITUS (GDM) IN SECOND TRIMESTER: ICD-10-CM

## 2024-01-17 PROCEDURE — 76819 FETAL BIOPHYS PROFIL W/O NST: CPT

## 2024-01-17 PROCEDURE — 59025 FETAL NON-STRESS TEST: CPT

## 2024-01-17 PROCEDURE — 99207 PR COMPLICATED OB VISIT: CPT | Performed by: FAMILY MEDICINE

## 2024-01-17 PROCEDURE — 59025 FETAL NON-STRESS TEST: CPT | Mod: 26 | Performed by: FAMILY MEDICINE

## 2024-01-17 ASSESSMENT — PAIN SCALES - GENERAL: PAINLEVEL: NO PAIN (0)

## 2024-01-17 NOTE — PLAN OF CARE
Fetal Non-Stress Test Results    NST Ordered By: Dr. Reza  NST Medical Indication: GDM    NST Start & Stop Times  NST Start Time: 0713  NST Stop Time: 0738    NST Results  Fetus A   Baseline Rate: 135  Accelerations: Present  Decelerations: None  Interpretation: reactive    Patient here for NST due to GDM and AMA. Patient denies LOF and bleeding. Patient reported judson bass contractions. No contractions noted or reported during NST. NST reactive. Dr. Reza notified of results. Order for discharge. AVS reviewed and patient discharged ambulatory.

## 2024-01-17 NOTE — PROGRESS NOTES
Had BPP this morning, no result yet  NST reactive  Sleeping better  Sugars are good, still at 18 units insulin  No contractions   education done  Very active baby

## 2024-01-17 NOTE — DISCHARGE INSTRUCTIONS
Discharge Instruction for Undelivered Patients      You were seen for:  NST  We Consulted: Dr. Reza  You had (Test or Medicine): NST     Diet:   Drink 8 to 12 glasses of liquids (milk, juice, water) every day.  You may eat meals and snacks.  To manage your diabetes, follow the guidelines for eating and drinking given to you by your Clinic Provider or Diabetes Educator.       Activity:  Count fetal kicks everyday (see handout)  Call your doctor or nurse midwife if your baby is moving less than usual.     Call your provider if you notice:  Swelling in your face or increased swelling in your hands or legs.  Headaches that are not relieved by Tylenol (acetaminophen).  Changes in your vision (blurring: seeing spots or stars.)  Nausea (sick to your stomach) and vomiting (throwing up).   Weight gain of 5 pounds or more per week.  Heartburn that doesn't go away.  Signs of bladder infection: pain when you urinate (use the toilet), need to go more often and more urgently.  The bag of peters (rupture of membranes) breaks, or you notice leaking in your underwear.  Bright red blood in your underwear.  Abdominal (lower belly) or stomach pain.  Increase or change in vaginal discharge (note the color and amount)  For first baby: Contractions (tightening) less than 5 minutes apart for one hour or more.  Second (plus) baby: Contractions (tightening) less than 10 minutes apart and getting stronger.  *If less than 34 weeks: Contractions (tightening) more than 6 times in one hour.    Follow-up:  As scheduled in the clinic

## 2024-01-24 ENCOUNTER — HOSPITAL ENCOUNTER (OUTPATIENT)
Facility: HOSPITAL | Age: 39
Discharge: HOME | End: 2024-01-24
Attending: FAMILY MEDICINE | Admitting: FAMILY MEDICINE
Payer: COMMERCIAL

## 2024-01-24 ENCOUNTER — HOSPITAL ENCOUNTER (OUTPATIENT)
Dept: ULTRASOUND IMAGING | Facility: HOSPITAL | Age: 39
Discharge: HOME | End: 2024-01-24
Attending: FAMILY MEDICINE | Admitting: FAMILY MEDICINE
Payer: COMMERCIAL

## 2024-01-24 VITALS
HEART RATE: 77 BPM | WEIGHT: 187 LBS | HEIGHT: 68 IN | OXYGEN SATURATION: 100 % | DIASTOLIC BLOOD PRESSURE: 75 MMHG | TEMPERATURE: 97.4 F | BODY MASS INDEX: 28.34 KG/M2 | SYSTOLIC BLOOD PRESSURE: 111 MMHG | RESPIRATION RATE: 18 BRPM

## 2024-01-24 DIAGNOSIS — O24.414 INSULIN CONTROLLED GESTATIONAL DIABETES MELLITUS (GDM) IN SECOND TRIMESTER: ICD-10-CM

## 2024-01-24 PROCEDURE — 59025 FETAL NON-STRESS TEST: CPT | Mod: 26 | Performed by: FAMILY MEDICINE

## 2024-01-24 PROCEDURE — 76819 FETAL BIOPHYS PROFIL W/O NST: CPT

## 2024-01-24 PROCEDURE — 59025 FETAL NON-STRESS TEST: CPT | Mod: 59

## 2024-01-24 NOTE — DISCHARGE INSTRUCTIONS

## 2024-01-24 NOTE — PLAN OF CARE
Viki Busch        NST:  reactive  Start: 0720  Stop: 0752    Physician: Dr. Reza  Reason For Test: Gestational Diabetes  Estimated Date of Delivery: Mar 2, 2024   Gestational Age: 34w4d     Verified with second RN: Nava BROOKE and Nicole BROOKE, RNs    Comments: Pt in for weekly NST. NST reactive. Dr. Reza spoken with via telephone and okay with discharge. Discharge instructions reviewed with patient and encouraged to call with any questions or concerns.       Cherry Landa RN

## 2024-01-31 ENCOUNTER — PRENATAL OFFICE VISIT (OUTPATIENT)
Dept: FAMILY MEDICINE | Facility: OTHER | Age: 39
End: 2024-01-31
Attending: FAMILY MEDICINE
Payer: COMMERCIAL

## 2024-01-31 ENCOUNTER — HOSPITAL ENCOUNTER (OUTPATIENT)
Dept: ULTRASOUND IMAGING | Facility: HOSPITAL | Age: 39
Discharge: HOME | End: 2024-01-31
Attending: FAMILY MEDICINE
Payer: COMMERCIAL

## 2024-01-31 ENCOUNTER — HOSPITAL ENCOUNTER (OUTPATIENT)
Facility: HOSPITAL | Age: 39
End: 2024-01-31
Admitting: FAMILY MEDICINE
Payer: COMMERCIAL

## 2024-01-31 ENCOUNTER — HOSPITAL ENCOUNTER (OUTPATIENT)
Facility: HOSPITAL | Age: 39
Discharge: HOME | End: 2024-01-31
Attending: FAMILY MEDICINE | Admitting: FAMILY MEDICINE
Payer: COMMERCIAL

## 2024-01-31 VITALS
TEMPERATURE: 97.2 F | SYSTOLIC BLOOD PRESSURE: 117 MMHG | RESPIRATION RATE: 16 BRPM | OXYGEN SATURATION: 97 % | DIASTOLIC BLOOD PRESSURE: 61 MMHG

## 2024-01-31 VITALS
SYSTOLIC BLOOD PRESSURE: 115 MMHG | WEIGHT: 190.44 LBS | HEART RATE: 83 BPM | OXYGEN SATURATION: 99 % | TEMPERATURE: 97 F | BODY MASS INDEX: 28.96 KG/M2 | DIASTOLIC BLOOD PRESSURE: 67 MMHG

## 2024-01-31 DIAGNOSIS — O36.63X0 EXCESSIVE FETAL GROWTH AFFECTING MANAGEMENT OF PREGNANCY IN THIRD TRIMESTER, SINGLE OR UNSPECIFIED FETUS: ICD-10-CM

## 2024-01-31 DIAGNOSIS — O24.414 INSULIN CONTROLLED GESTATIONAL DIABETES MELLITUS (GDM) IN THIRD TRIMESTER: ICD-10-CM

## 2024-01-31 DIAGNOSIS — O24.414 INSULIN CONTROLLED GESTATIONAL DIABETES MELLITUS (GDM) IN SECOND TRIMESTER: ICD-10-CM

## 2024-01-31 DIAGNOSIS — R79.89 LOW SERUM PROGESTERONE: ICD-10-CM

## 2024-01-31 DIAGNOSIS — O09.529 AMA (ADVANCED MATERNAL AGE) MULTIGRAVIDA 35+, UNSPECIFIED TRIMESTER: ICD-10-CM

## 2024-01-31 DIAGNOSIS — O09.899 SUPERVISION OF OTHER HIGH RISK PREGNANCY, ANTEPARTUM: Primary | ICD-10-CM

## 2024-01-31 LAB
ERYTHROCYTE [DISTWIDTH] IN BLOOD BY AUTOMATED COUNT: 13.9 % (ref 10–15)
HCT VFR BLD AUTO: 39.3 % (ref 35–47)
HGB BLD-MCNC: 13.4 G/DL (ref 11.7–15.7)
MCH RBC QN AUTO: 30.8 PG (ref 26.5–33)
MCHC RBC AUTO-ENTMCNC: 34.1 G/DL (ref 31.5–36.5)
MCV RBC AUTO: 90 FL (ref 78–100)
PLATELET # BLD AUTO: 183 10E3/UL (ref 150–450)
RBC # BLD AUTO: 4.35 10E6/UL (ref 3.8–5.2)
WBC # BLD AUTO: 7.2 10E3/UL (ref 4–11)

## 2024-01-31 PROCEDURE — 36415 COLL VENOUS BLD VENIPUNCTURE: CPT | Performed by: FAMILY MEDICINE

## 2024-01-31 PROCEDURE — 84144 ASSAY OF PROGESTERONE: CPT | Performed by: FAMILY MEDICINE

## 2024-01-31 PROCEDURE — 59025 FETAL NON-STRESS TEST: CPT

## 2024-01-31 PROCEDURE — 59025 FETAL NON-STRESS TEST: CPT | Mod: 26 | Performed by: FAMILY MEDICINE

## 2024-01-31 PROCEDURE — 99207 PR COMPLICATED OB VISIT: CPT | Performed by: FAMILY MEDICINE

## 2024-01-31 PROCEDURE — 76819 FETAL BIOPHYS PROFIL W/O NST: CPT

## 2024-01-31 PROCEDURE — 85027 COMPLETE CBC AUTOMATED: CPT | Performed by: FAMILY MEDICINE

## 2024-01-31 ASSESSMENT — PAIN SCALES - GENERAL: PAINLEVEL: NO PAIN (0)

## 2024-01-31 NOTE — PROGRESS NOTES
Doing well, insulin up to 22 units to cover higher morning sugars  Education done  BPP and NST 10/10 today  Reviewed measurements from 1/17 BPD was <3rd%, reviewed with radiologist, and reassurance given, not concerning based on HC  GBS next week  Feeling good, lots of mvmt

## 2024-01-31 NOTE — DISCHARGE INSTRUCTIONS
Discharge Instructions for Undelivered Patients    Diet:  * Drink 8 to 12 glasses of liquids (milk, juice, water) every day  * You may eat meals and snacks.    Activity:  * Count fetal kicks every day.  * Call your doctor if your baby is moving less than usual.    Call your provider if you notice:  * Swelling in your face or increased swelling in your hands or legs.  * Headaches that are not relieved by Tylenol (acetaminophen).  * Changes in your vision (blurring; seeing spots or stars).  * Nausea (sick to your stomach) and vomiting (throwing up).  * Weight gain of 5 pounds per week.  * Heartburn that doesn't go away.  * Signs of bladder infection: Pain when you urinate (use the toilet), needing to go more often or more urgently.  * The bag of peters (membrane) breaks, or you notice leaking in your underwear.  * Bright red blood in your underwear.  * Abdominal (lower belly) or stomach pain.  * Second (plus) baby: Contractions (tightenings) less than 10 minutes apart and getting stronger.  * Increase or change in vaginal discharge (note the color and amount).      [facility]: [phone]

## 2024-01-31 NOTE — PROGRESS NOTES
Viki Busch        NST:  reactive  Start: 0710  Stop: 0733    Physician: Dr. Reza  Reason For Test: Advanced Maternal Age and Gestational Diabetes  Estimated Date of Delivery: Mar 2, 2024   Gestational Age: 35w4d     Verified with second RN: Nicole DODGE RN    Comments:  Telephone orders given by MD for discharge.  AVS printed and discharge instructions given to patient.  Patient verbalizes understanding of discharge instructions.  Discharge to home by ambulation at 0745.      Lilia Bettencourt RN

## 2024-02-01 LAB — PROGEST SERPL-MCNC: 130 NG/ML

## 2024-02-07 ENCOUNTER — HOSPITAL ENCOUNTER (OUTPATIENT)
Dept: ULTRASOUND IMAGING | Facility: HOSPITAL | Age: 39
Discharge: HOME | End: 2024-02-07
Attending: FAMILY MEDICINE | Admitting: FAMILY MEDICINE
Payer: COMMERCIAL

## 2024-02-07 ENCOUNTER — HOSPITAL ENCOUNTER (OUTPATIENT)
Facility: HOSPITAL | Age: 39
Discharge: HOME | End: 2024-02-07
Attending: FAMILY MEDICINE | Admitting: FAMILY MEDICINE
Payer: COMMERCIAL

## 2024-02-07 VITALS
TEMPERATURE: 97.4 F | RESPIRATION RATE: 16 BRPM | SYSTOLIC BLOOD PRESSURE: 125 MMHG | OXYGEN SATURATION: 99 % | DIASTOLIC BLOOD PRESSURE: 72 MMHG

## 2024-02-07 DIAGNOSIS — O24.414 INSULIN CONTROLLED GESTATIONAL DIABETES MELLITUS (GDM) IN SECOND TRIMESTER: ICD-10-CM

## 2024-02-07 PROCEDURE — 59025 FETAL NON-STRESS TEST: CPT

## 2024-02-07 PROCEDURE — 76819 FETAL BIOPHYS PROFIL W/O NST: CPT

## 2024-02-07 NOTE — PLAN OF CARE
Viki Busch        NST:  reactive  Start: 0710  Stop: 0737    Physician: Dr Reza  Reason For Test: Gestational Diabetes  Estimated Date of Delivery: Mar 2, 2024   Gestational Age: 36w4d     Verified with second RN: Nicole MORALES    Comments:  Pt here for a scheduled NST.  NST complete and reactive.  Pt denies any vaginal bleeding or leaking or fluids, reports normal fetal movement.  Pt did report occasional contractions that aren't painful.   AVS reviewed and pt discharged ambulatory.         Briana Mccormack RN

## 2024-02-09 ENCOUNTER — PRENATAL OFFICE VISIT (OUTPATIENT)
Dept: FAMILY MEDICINE | Facility: OTHER | Age: 39
End: 2024-02-09
Attending: FAMILY MEDICINE
Payer: COMMERCIAL

## 2024-02-09 VITALS
WEIGHT: 193.5 LBS | TEMPERATURE: 97.7 F | RESPIRATION RATE: 16 BRPM | SYSTOLIC BLOOD PRESSURE: 138 MMHG | OXYGEN SATURATION: 99 % | BODY MASS INDEX: 29.33 KG/M2 | HEIGHT: 68 IN | DIASTOLIC BLOOD PRESSURE: 72 MMHG | HEART RATE: 73 BPM

## 2024-02-09 DIAGNOSIS — O24.414 INSULIN CONTROLLED GESTATIONAL DIABETES MELLITUS (GDM) IN THIRD TRIMESTER: Primary | ICD-10-CM

## 2024-02-09 DIAGNOSIS — O09.529 AMA (ADVANCED MATERNAL AGE) MULTIGRAVIDA 35+, UNSPECIFIED TRIMESTER: ICD-10-CM

## 2024-02-09 DIAGNOSIS — O09.899 SUPERVISION OF OTHER HIGH RISK PREGNANCY, ANTEPARTUM: ICD-10-CM

## 2024-02-09 PROCEDURE — 99207 PR COMPLICATED OB VISIT: CPT | Performed by: FAMILY MEDICINE

## 2024-02-09 PROCEDURE — 87653 STREP B DNA AMP PROBE: CPT | Performed by: FAMILY MEDICINE

## 2024-02-09 ASSESSMENT — PAIN SCALES - GENERAL: PAINLEVEL: NO PAIN (0)

## 2024-02-10 LAB — GP B STREP DNA SPEC QL NAA+PROBE: NEGATIVE

## 2024-02-10 NOTE — PROGRESS NOTES
Having occasional contractions  Lots of stressors at work with boss gone  GBS today  Doing NSTs and BPPs with good results  Education done  Questions answered  +mvmt

## 2024-02-14 ENCOUNTER — PRENATAL OFFICE VISIT (OUTPATIENT)
Dept: FAMILY MEDICINE | Facility: OTHER | Age: 39
End: 2024-02-14
Attending: FAMILY MEDICINE
Payer: COMMERCIAL

## 2024-02-14 ENCOUNTER — HOSPITAL ENCOUNTER (OUTPATIENT)
Dept: ULTRASOUND IMAGING | Facility: HOSPITAL | Age: 39
Discharge: HOME | End: 2024-02-14
Attending: FAMILY MEDICINE
Payer: COMMERCIAL

## 2024-02-14 ENCOUNTER — HOSPITAL ENCOUNTER (OUTPATIENT)
Facility: HOSPITAL | Age: 39
Discharge: HOME | End: 2024-02-14
Attending: FAMILY MEDICINE | Admitting: FAMILY MEDICINE
Payer: COMMERCIAL

## 2024-02-14 VITALS
HEART RATE: 96 BPM | SYSTOLIC BLOOD PRESSURE: 121 MMHG | DIASTOLIC BLOOD PRESSURE: 82 MMHG | TEMPERATURE: 97.5 F | RESPIRATION RATE: 16 BRPM | OXYGEN SATURATION: 98 %

## 2024-02-14 VITALS
SYSTOLIC BLOOD PRESSURE: 110 MMHG | OXYGEN SATURATION: 98 % | HEART RATE: 82 BPM | BODY MASS INDEX: 29.27 KG/M2 | DIASTOLIC BLOOD PRESSURE: 80 MMHG | WEIGHT: 192.5 LBS | TEMPERATURE: 97.9 F

## 2024-02-14 DIAGNOSIS — R79.89 LOW SERUM PROGESTERONE: ICD-10-CM

## 2024-02-14 DIAGNOSIS — O24.414 INSULIN CONTROLLED GESTATIONAL DIABETES MELLITUS (GDM) IN SECOND TRIMESTER: ICD-10-CM

## 2024-02-14 DIAGNOSIS — O09.899 SUPERVISION OF OTHER HIGH RISK PREGNANCY, ANTEPARTUM: Primary | ICD-10-CM

## 2024-02-14 DIAGNOSIS — O24.414 INSULIN CONTROLLED GESTATIONAL DIABETES MELLITUS (GDM) IN THIRD TRIMESTER: ICD-10-CM

## 2024-02-14 DIAGNOSIS — O09.529 AMA (ADVANCED MATERNAL AGE) MULTIGRAVIDA 35+, UNSPECIFIED TRIMESTER: ICD-10-CM

## 2024-02-14 PROCEDURE — 76819 FETAL BIOPHYS PROFIL W/O NST: CPT

## 2024-02-14 PROCEDURE — 59025 FETAL NON-STRESS TEST: CPT | Mod: 59

## 2024-02-14 PROCEDURE — 59025 FETAL NON-STRESS TEST: CPT | Mod: 26 | Performed by: FAMILY MEDICINE

## 2024-02-14 PROCEDURE — 76816 OB US FOLLOW-UP PER FETUS: CPT

## 2024-02-14 PROCEDURE — 99207 PR COMPLICATED OB VISIT: CPT | Performed by: FAMILY MEDICINE

## 2024-02-14 ASSESSMENT — PAIN SCALES - GENERAL: PAINLEVEL: NO PAIN (0)

## 2024-02-14 NOTE — PLAN OF CARE
Viki Busch        NST:  reactive  Start: 0738  Stop: 0810    Physician:Dr. Reza  Reason For Test:GDM on insulin  Estimated Date of Delivery: Mar 2, 2024   Gestational Age: 37w4d     Verified with second RN: CARMEN Amin    Comments:  Pt here for weekly NST for GDM on insulin. Educated on test and placed on monitor. VSS. NST reactive and second RN verified. Dr. Reza paged and updated on reactive strip. Order to discharge home given. AVS given and reviewed. States understanding and denies any questions orc concerns. Pt discharge home ind.       Nicole Ramirez, RN

## 2024-02-14 NOTE — DISCHARGE INSTRUCTIONS
Learning About When to Call Your Doctor During Pregnancy (After 20 Weeks)  Overview  It's common to have concerns about what might be a problem when you're pregnant. Most pregnancies don't have any serious problems. But it's still important to know when to call your doctor if you have certain symptoms or signs of labor.  These are general suggestions. Your doctor may give you some more information about when to call.  When to call your doctor (after 20 weeks)  Call 911  anytime you think you may need emergency care. For example, call if:  You have severe vaginal bleeding. This means you are soaking through a pad each hour for 2 or more hours.  You have sudden, severe pain in your belly.  You have chest pain, are short of breath, or cough up blood.  You passed out (lost consciousness).  You have a seizure.  You see or feel the umbilical cord.  You think you are about to deliver your baby and can't make it safely to the hospital or birthing center.  Call your doctor now or seek immediate medical care if:  You have vaginal bleeding.  You have belly pain.  You have a fever.  You are dizzy or lightheaded, or you feel like you may faint.  You have signs of a blood clot in your leg (called a deep vein thrombosis), such as:  Pain in the calf, back of the knee, thigh, or groin.  Swelling in your leg or groin.  A color change on the leg or groin. The skin may be reddish or purplish, depending on your usual skin color.  You have symptoms of preeclampsia, such as:  Sudden swelling of your face, hands, or feet.  New vision problems (such as dimness, blurring, or seeing spots).  A severe headache.  You have a sudden release of fluid from your vagina. (You think your water broke.)  You've been having regular contractions for an hour. This means that you've had at least 6 contractions within 1 hour, even after you change your position and drink fluids.  You notice that your baby has stopped moving or is moving less than  "normal.  You have signs of heart failure, such as:  New or increased shortness of breath.  New or worse swelling in your legs, ankles, or feet.  Sudden weight gain, such as more than 2 to 3 pounds in a day or 5 pounds in a week.  Feeling so tired or weak that you cannot do your usual activities.  You have symptoms of a urinary tract infection. These may include:  Pain or burning when you urinate.  A frequent need to urinate without being able to pass much urine.  Pain in the flank, which is just below the rib cage and above the waist on either side of the back.  Blood in your urine.  Watch closely for changes in your health, and be sure to contact your doctor if:  You have vaginal discharge that smells bad.  You feel sad, anxious, or hopeless for more than a few days.  You have skin changes, such as a rash, itching, or a yellow color to your skin.  You have other concerns about your pregnancy.  If you have labor signs at 37 weeks or more  If you have signs of labor at 37 weeks or more, your doctor may tell you to call when your labor becomes more active. Symptoms of active labor include:  Contractions that are regular.  Contractions that are less than 5 minutes apart.  Contractions that are hard to talk through.  Follow-up care is a key part of your treatment and safety. Be sure to make and go to all appointments, and call your doctor if you are having problems. It's also a good idea to know your test results and keep a list of the medicines you take.  Where can you learn more?  Go to https://www.[employer].net/patiented  Enter N531 in the search box to learn more about \"Learning About When to Call Your Doctor During Pregnancy (After 20 Weeks).\"  Current as of: July 11, 2023               Content Version: 13.8    3403-4288 [employer].   Care instructions adapted under license by your healthcare professional. If you have questions about a medical condition or this instruction, always ask your healthcare " professional. [employer] disclaims any warranty or liability for your use of this information.

## 2024-02-15 ENCOUNTER — MYC MEDICAL ADVICE (OUTPATIENT)
Dept: FAMILY MEDICINE | Facility: OTHER | Age: 39
End: 2024-02-15

## 2024-02-15 NOTE — PROGRESS NOTES
Stress is better   No real contractions  Getting things ready for delivery  Reviewed NST and BPP, discussed with MFM, not concerning  Education done  GBS negative  Follow-up 1 wk or sooner aat BC

## 2024-02-21 ENCOUNTER — HOSPITAL ENCOUNTER (OUTPATIENT)
Facility: HOSPITAL | Age: 39
Discharge: HOME | End: 2024-02-21
Attending: FAMILY MEDICINE | Admitting: FAMILY MEDICINE
Payer: COMMERCIAL

## 2024-02-21 ENCOUNTER — PRENATAL OFFICE VISIT (OUTPATIENT)
Dept: FAMILY MEDICINE | Facility: OTHER | Age: 39
End: 2024-02-21
Attending: FAMILY MEDICINE
Payer: COMMERCIAL

## 2024-02-21 VITALS
RESPIRATION RATE: 18 BRPM | DIASTOLIC BLOOD PRESSURE: 81 MMHG | OXYGEN SATURATION: 96 % | HEART RATE: 81 BPM | TEMPERATURE: 98 F | SYSTOLIC BLOOD PRESSURE: 137 MMHG

## 2024-02-21 VITALS
OXYGEN SATURATION: 99 % | DIASTOLIC BLOOD PRESSURE: 80 MMHG | HEART RATE: 77 BPM | SYSTOLIC BLOOD PRESSURE: 122 MMHG | HEIGHT: 68 IN | TEMPERATURE: 97.7 F | WEIGHT: 189.7 LBS | BODY MASS INDEX: 28.75 KG/M2

## 2024-02-21 DIAGNOSIS — O24.414 INSULIN CONTROLLED GESTATIONAL DIABETES MELLITUS (GDM) IN THIRD TRIMESTER: Primary | ICD-10-CM

## 2024-02-21 DIAGNOSIS — R79.89 LOW SERUM PROGESTERONE: ICD-10-CM

## 2024-02-21 DIAGNOSIS — O09.899 SUPERVISION OF OTHER HIGH RISK PREGNANCY, ANTEPARTUM: ICD-10-CM

## 2024-02-21 PROCEDURE — 59025 FETAL NON-STRESS TEST: CPT | Mod: 26 | Performed by: FAMILY MEDICINE

## 2024-02-21 PROCEDURE — 59025 FETAL NON-STRESS TEST: CPT

## 2024-02-21 PROCEDURE — 99207 PR COMPLICATED OB VISIT: CPT | Performed by: FAMILY MEDICINE

## 2024-02-21 PROCEDURE — G0463 HOSPITAL OUTPT CLINIC VISIT: HCPCS | Mod: 25

## 2024-02-21 ASSESSMENT — PAIN SCALES - GENERAL: PAINLEVEL: NO PAIN (0)

## 2024-02-21 NOTE — DISCHARGE INSTRUCTIONS
Discharge Instructions for Undelivered Patients    Diet:  * Drink 8 to 12 glasses of liquids (milk, juice, water) every day  * You may eat meals and snacks.    Activity:  * Count fetal kicks every day.  * Call your doctor if your baby is moving less than usual.    Call your provider if you notice:  * Swelling in your face or increased swelling in your hands or legs.  * Headaches that are not relieved by Tylenol (acetaminophen).  * Changes in your vision (blurring; seeing spots or stars).  * Nausea (sick to your stomach) and vomiting (throwing up).  * Weight gain of 5 pounds per week.  * Heartburn that doesn't go away.  * Signs of bladder infection: Pain when you urinate (use the toilet), needing to go more often or more urgently.  * The bag of peters (membrane) breaks, or you notice leaking in your underwear.  * Bright red blood in your underwear.  * Abdominal (lower belly) or stomach pain.  * Second (plus) baby: Contractions (tightenings) less than 10 minutes apart and getting stronger.  * Increase or change in vaginal discharge (note the color and amount).      [facility]: [phone]

## 2024-02-21 NOTE — PLAN OF CARE
Patient presented to the unit from the clinic for an NST. See flowsheets for data. Dr. Reza notified of reactive, extended NST. Telephone order for NST and discharge obtained. AVS reviewed and patient denied questions. Patient discharged to home @ 10:35 am.

## 2024-02-21 NOTE — PLAN OF CARE
Viki Busch        NST:  reactive  Start: 0952  Stop: 1021    Physician: Dr. Reza  Reason For Test: Advanced Maternal Age and Gestational Diabetes  Estimated Date of Delivery: Mar 2, 2024   Gestational Age: 38w4d     Verified with second RN: Nicole MORALES    Comments: Extended.       Juana Mcintosh RN

## 2024-02-22 NOTE — PROGRESS NOTES
Had contractions the past 2 nights, was breathing through some but stopped on their own  Education done  Bpp wasn't scheduled this week second to original induction for 2/19, will see if we can still get one this week  NST today was good  Sugars are good, insulin at 26 units  Her body seems to be readying similar to last delivery

## 2024-02-25 ENCOUNTER — TELEPHONE (OUTPATIENT)
Dept: OBGYN | Facility: HOSPITAL | Age: 39
End: 2024-02-25

## 2024-02-25 NOTE — TELEPHONE ENCOUNTER
Pt called to remind of scheduled induction of labor tomorrow at 0730.  Questions answered and pt aware to call with any questions or concerns.

## 2024-02-26 ENCOUNTER — HOSPITAL ENCOUNTER (OUTPATIENT)
Facility: HOSPITAL | Age: 39
Discharge: HOME | End: 2024-02-26
Attending: FAMILY MEDICINE | Admitting: FAMILY MEDICINE
Payer: COMMERCIAL

## 2024-02-26 ENCOUNTER — APPOINTMENT (OUTPATIENT)
Dept: ULTRASOUND IMAGING | Facility: HOSPITAL | Age: 39
End: 2024-02-26
Attending: FAMILY MEDICINE
Payer: COMMERCIAL

## 2024-02-26 VITALS
DIASTOLIC BLOOD PRESSURE: 73 MMHG | TEMPERATURE: 98.7 F | HEART RATE: 86 BPM | RESPIRATION RATE: 18 BRPM | OXYGEN SATURATION: 98 % | SYSTOLIC BLOOD PRESSURE: 120 MMHG

## 2024-02-26 PROBLEM — Z34.90 PREGNANCY: Status: ACTIVE | Noted: 2024-02-26

## 2024-02-26 LAB
ABO/RH(D): NORMAL
ANTIBODY SCREEN: NEGATIVE
ERYTHROCYTE [DISTWIDTH] IN BLOOD BY AUTOMATED COUNT: 13.6 % (ref 10–15)
HCT VFR BLD AUTO: 39.4 % (ref 35–47)
HGB BLD-MCNC: 13.7 G/DL (ref 11.7–15.7)
HOLD SPECIMEN: NORMAL
HOLD SPECIMEN: NORMAL
MCH RBC QN AUTO: 31 PG (ref 26.5–33)
MCHC RBC AUTO-ENTMCNC: 34.8 G/DL (ref 31.5–36.5)
MCV RBC AUTO: 89 FL (ref 78–100)
PLATELET # BLD AUTO: 180 10E3/UL (ref 150–450)
RBC # BLD AUTO: 4.42 10E6/UL (ref 3.8–5.2)
SPECIMEN EXPIRATION DATE: NORMAL
WBC # BLD AUTO: 6.5 10E3/UL (ref 4–11)

## 2024-02-26 PROCEDURE — 36415 COLL VENOUS BLD VENIPUNCTURE: CPT | Performed by: FAMILY MEDICINE

## 2024-02-26 PROCEDURE — 86900 BLOOD TYPING SEROLOGIC ABO: CPT | Performed by: FAMILY MEDICINE

## 2024-02-26 PROCEDURE — 85027 COMPLETE CBC AUTOMATED: CPT | Performed by: FAMILY MEDICINE

## 2024-02-26 PROCEDURE — 76816 OB US FOLLOW-UP PER FETUS: CPT

## 2024-02-26 PROCEDURE — 120N000001 HC R&B MED SURG/OB

## 2024-02-26 PROCEDURE — 76819 FETAL BIOPHYS PROFIL W/O NST: CPT

## 2024-02-26 RX ORDER — CITRIC ACID/SODIUM CITRATE 334-500MG
30 SOLUTION, ORAL ORAL
Status: DISCONTINUED | OUTPATIENT
Start: 2024-02-26 | End: 2024-02-26 | Stop reason: HOSPADM

## 2024-02-26 RX ORDER — NALOXONE HYDROCHLORIDE 0.4 MG/ML
0.4 INJECTION, SOLUTION INTRAMUSCULAR; INTRAVENOUS; SUBCUTANEOUS
Status: DISCONTINUED | OUTPATIENT
Start: 2024-02-26 | End: 2024-02-26 | Stop reason: HOSPADM

## 2024-02-26 RX ORDER — ONDANSETRON 4 MG/1
4 TABLET, ORALLY DISINTEGRATING ORAL EVERY 6 HOURS PRN
Status: DISCONTINUED | OUTPATIENT
Start: 2024-02-26 | End: 2024-02-26 | Stop reason: HOSPADM

## 2024-02-26 RX ORDER — METOCLOPRAMIDE HYDROCHLORIDE 5 MG/ML
10 INJECTION INTRAMUSCULAR; INTRAVENOUS EVERY 6 HOURS PRN
Status: DISCONTINUED | OUTPATIENT
Start: 2024-02-26 | End: 2024-02-26 | Stop reason: HOSPADM

## 2024-02-26 RX ORDER — PROCHLORPERAZINE MALEATE 10 MG
10 TABLET ORAL EVERY 6 HOURS PRN
Status: DISCONTINUED | OUTPATIENT
Start: 2024-02-26 | End: 2024-02-26 | Stop reason: HOSPADM

## 2024-02-26 RX ORDER — OXYTOCIN/0.9 % SODIUM CHLORIDE 30/500 ML
100-340 PLASTIC BAG, INJECTION (ML) INTRAVENOUS CONTINUOUS PRN
Status: DISCONTINUED | OUTPATIENT
Start: 2024-02-26 | End: 2024-02-26 | Stop reason: HOSPADM

## 2024-02-26 RX ORDER — METOCLOPRAMIDE 10 MG/1
10 TABLET ORAL EVERY 6 HOURS PRN
Status: DISCONTINUED | OUTPATIENT
Start: 2024-02-26 | End: 2024-02-26 | Stop reason: HOSPADM

## 2024-02-26 RX ORDER — FENTANYL CITRATE 50 UG/ML
50 INJECTION, SOLUTION INTRAMUSCULAR; INTRAVENOUS EVERY 30 MIN PRN
Status: DISCONTINUED | OUTPATIENT
Start: 2024-02-26 | End: 2024-02-26 | Stop reason: HOSPADM

## 2024-02-26 RX ORDER — KETOROLAC TROMETHAMINE 30 MG/ML
30 INJECTION, SOLUTION INTRAMUSCULAR; INTRAVENOUS
Status: DISCONTINUED | OUTPATIENT
Start: 2024-02-26 | End: 2024-02-26 | Stop reason: HOSPADM

## 2024-02-26 RX ORDER — CARBOPROST TROMETHAMINE 250 UG/ML
250 INJECTION, SOLUTION INTRAMUSCULAR
Status: DISCONTINUED | OUTPATIENT
Start: 2024-02-26 | End: 2024-02-26 | Stop reason: HOSPADM

## 2024-02-26 RX ORDER — OXYTOCIN/0.9 % SODIUM CHLORIDE 30/500 ML
340 PLASTIC BAG, INJECTION (ML) INTRAVENOUS CONTINUOUS PRN
Status: DISCONTINUED | OUTPATIENT
Start: 2024-02-26 | End: 2024-02-26 | Stop reason: HOSPADM

## 2024-02-26 RX ORDER — ONDANSETRON 2 MG/ML
4 INJECTION INTRAMUSCULAR; INTRAVENOUS EVERY 6 HOURS PRN
Status: DISCONTINUED | OUTPATIENT
Start: 2024-02-26 | End: 2024-02-26 | Stop reason: HOSPADM

## 2024-02-26 RX ORDER — TRANEXAMIC ACID 10 MG/ML
1 INJECTION, SOLUTION INTRAVENOUS EVERY 30 MIN PRN
Status: DISCONTINUED | OUTPATIENT
Start: 2024-02-26 | End: 2024-02-26 | Stop reason: HOSPADM

## 2024-02-26 RX ORDER — OXYTOCIN 10 [USP'U]/ML
10 INJECTION, SOLUTION INTRAMUSCULAR; INTRAVENOUS
Status: DISCONTINUED | OUTPATIENT
Start: 2024-02-26 | End: 2024-02-26 | Stop reason: HOSPADM

## 2024-02-26 RX ORDER — NALOXONE HYDROCHLORIDE 0.4 MG/ML
0.2 INJECTION, SOLUTION INTRAMUSCULAR; INTRAVENOUS; SUBCUTANEOUS
Status: DISCONTINUED | OUTPATIENT
Start: 2024-02-26 | End: 2024-02-26 | Stop reason: HOSPADM

## 2024-02-26 RX ORDER — PROCHLORPERAZINE 25 MG
25 SUPPOSITORY, RECTAL RECTAL EVERY 12 HOURS PRN
Status: DISCONTINUED | OUTPATIENT
Start: 2024-02-26 | End: 2024-02-26 | Stop reason: HOSPADM

## 2024-02-26 RX ORDER — METHYLERGONOVINE MALEATE 0.2 MG/ML
200 INJECTION INTRAVENOUS
Status: DISCONTINUED | OUTPATIENT
Start: 2024-02-26 | End: 2024-02-26 | Stop reason: HOSPADM

## 2024-02-26 RX ORDER — LOPERAMIDE HCL 2 MG
2 CAPSULE ORAL
Status: DISCONTINUED | OUTPATIENT
Start: 2024-02-26 | End: 2024-02-26 | Stop reason: HOSPADM

## 2024-02-26 RX ORDER — LOPERAMIDE HCL 2 MG
4 CAPSULE ORAL
Status: DISCONTINUED | OUTPATIENT
Start: 2024-02-26 | End: 2024-02-26 | Stop reason: HOSPADM

## 2024-02-26 RX ORDER — SODIUM CHLORIDE, SODIUM LACTATE, POTASSIUM CHLORIDE, CALCIUM CHLORIDE 600; 310; 30; 20 MG/100ML; MG/100ML; MG/100ML; MG/100ML
INJECTION, SOLUTION INTRAVENOUS CONTINUOUS PRN
Status: DISCONTINUED | OUTPATIENT
Start: 2024-02-26 | End: 2024-02-26 | Stop reason: HOSPADM

## 2024-02-26 RX ORDER — LIDOCAINE 40 MG/G
CREAM TOPICAL
Status: DISCONTINUED | OUTPATIENT
Start: 2024-02-26 | End: 2024-02-26 | Stop reason: HOSPADM

## 2024-02-26 RX ORDER — IBUPROFEN 800 MG/1
800 TABLET, FILM COATED ORAL
Status: DISCONTINUED | OUTPATIENT
Start: 2024-02-26 | End: 2024-02-26 | Stop reason: HOSPADM

## 2024-02-26 RX ORDER — MISOPROSTOL 200 UG/1
400 TABLET ORAL
Status: DISCONTINUED | OUTPATIENT
Start: 2024-02-26 | End: 2024-02-26 | Stop reason: HOSPADM

## 2024-02-26 ASSESSMENT — ACTIVITIES OF DAILY LIVING (ADL)
ADLS_ACUITY_SCORE: 18

## 2024-02-26 NOTE — H&P
[facility] Labor and Delivery History and Physical    Heath Mota MRN# [id number]   Age: 38 year old YOB: 1985     Date of Admission:  2024    Primary care provider: Ludy Reza           Chief Complaint:   Heath Mota is a 38 year old female who is 39w2d pregnant and being admitted for induction of labor, indication GDM and elderly multigravida.          Pregnancy history:     OBSTETRIC HISTORY:    OB History    Para Term  AB Living   6 4 4 0 1 4   SAB IAB Ectopic Multiple Live Births   1 0 0 0 1      # Outcome Date GA Lbr Chi/2nd Weight Sex Delivery Anes PTL Lv   6 Current            5 Term 22 39w0d 01:14 / 00:06 3.385 kg (7 lb 7.4 oz) M Vag-Spont INT N TAMAR      Name: SVETLANAMALE-HEATH      Apgar1: 9  Apgar5: 9   4 Term 03/15/18 40w2d 21:50 / 00:27 3.805 kg (8 lb 6.2 oz) F Vag-Spont INT N       Name: JITENDRA MOTA      Apgar1: 7  Apgar5: 8   3 SAB            2 Term            1 Term               Obstetric Comments   Breast and bottle fed       EDC: Estimated Date of Delivery: Mar 2, 2024    Prenatal Labs:   Lab Results   Component Value Date    ABO A 2018    RH Pos 2018    AS Negative 2023    HEPBANG Nonreactive 2023    CHPCRT Negative 2017    GCPCRT Negative 2017    TREPAB Negative 2018    HGB 13.4 2024       GBS Status:   Lab Results   Component Value Date    GBS Negative 2018       Active Problem List  Patient Active Problem List   Diagnosis    PCOS (polycystic ovarian syndrome)    Premenstrual syndrome    Hypovitaminosis D    Low serum progesterone    Galactorrhea    Procreative counseling and advice using natural family planning    Ectropion of cervix    AMA (advanced maternal age) multigravida 35+, unspecified trimester    Supervision of other high risk pregnancy, antepartum    ACP (advance care planning)    Excessive fetal growth affecting management of pregnancy in third  trimester, single or unspecified fetus     (normal spontaneous vaginal delivery)    Insulin controlled gestational diabetes mellitus (GDM) in third trimester    Pregnancy       Medication Prior to Admission  Medications Prior to Admission   Medication Sig Dispense Refill Last Dose    hydrOXYzine aakash (VISTARIL) 25 MG capsule Take 1-2 capsules (25-50 mg) by mouth at bedtime as needed, may repeat once for other (sleep) 40 capsule 1 Past Week    insulin  UNIT/ML injection Prime needle with 2 units, then inject 26 units under the skin. Dose adjusting per [facility]. 15 mL 0 2024    Prenatal Vit-Fe Fumarate-FA (PRENATAL MULTIVITAMIN W/IRON) 27-0.8 MG tablet Take 1 tablet by mouth daily 90 tablet 3 2024    insulin pen needle (32G X 4 MM) 32G X 4 MM miscellaneous Use 1 pen needle daily 100 each 3    .        Maternal Past Medical History:     Past Medical History:   Diagnosis Date    Abnormal cervical Papanicolaou smear     Herpes zoster without complication 2021    right hip, not treated    Infection due to 2019 novel coronavirus 2021    PCOS (polycystic ovarian syndrome) 2017    Premenstrual syndrome 2017                       Family History:     Family History   Problem Relation Age of Onset    Thyroid Disease Mother     Hyperlipidemia Father     Family History Negative Sister     Family History Negative Brother     Family History Negative Brother     Other - See Comments Maternal Grandmother         depression    Other - See Comments Maternal Grandfather         old age    Other - See Comments Paternal Grandmother         old age    Other - See Comments Paternal Grandfather         old age               Social History:     Social History     Tobacco Use    Smoking status: Former     Packs/day: 0.20     Years: 6.00     Additional pack years: 0.00     Total pack years: 1.20     Types: Cigarettes     Passive exposure: Never    Smokeless tobacco: Never    Tobacco comments:     quit     Substance Use Topics    Alcohol use: Not Currently     Comment: 2-3x/month            Review of Systems:   Review of systems negative except as stated above.         Physical Exam:   Vitals were reviewed  Blood pressure 120/73, pulse 86, temperature 98.7  F (37.1  C), temperature source Oral, resp. rate 18, last menstrual period 05/22/2023, SpO2 98%, not currently breastfeeding.  Constitutional:   awake, alert, cooperative, no apparent distress, and appears stated age   Eyes:   Lids and lashes normal, pupils equal, round and reactive to light, extra ocular muscles intact, sclera clear, conjunctiva normal   ENT:   Normocephalic, without obvious abnormality, atramatic, external ears without lesions, oral pharynx with moist mucus membranes, tonsils without erythema or exudates, gums normal and good dentition.   Neck:   Supple, symmetrical, trachea midline, no adenopathy, thyroid symmetric, not enlarged and no tenderness, skin normal   Hematologic / Lymphatic:   no cervical lymphadenopathy   Lungs:   No increased work of breathing, good air exchange, clear to auscultation bilaterally, no crackles or wheezing   Cardiovascular:   Normal apical impulse, regular rate and rhythm, normal S1 and S2, no S3 or S4, and no murmur noted   Abdomen:   No scars, normal bowel sounds, soft, non-distended, non-tender, no masses palpated, no hepatosplenomegally   Genitounirinary:   External Genitalia:  General appearance; normal  Vagina:  General appearance normal  Cervix:  See below  Uterus:  gravid   Musculoskeletal:   There is no redness, warmth, or swelling of the joints.  Full range of motion noted.  Motor strength is 5 out of 5 all extremities bilaterally.  Tone is normal.   Neurologic:   Awake, alert, oriented to name, place and time.  Cranial nerves II-XII are grossly intact.  Motor is 5 out of 5 bilaterally.  Sensory is intact.  gait is normal.   Neuropsychiatric:   General: normal, calm and normal eye contact  Level of  consciousness: alert / normal  Orientation: oriented to self, place, time and situation   Skin:   no bruising or bleeding and scattered tattooes      Cervix:   Membranes: intact   Dilation: 1.5   Effacement: 40%   Station:-2   Consistency: average   Position: Posterior  Presentation:Vertex  Fetal Heart Rate Tracing: reactive and reassuring  Tocometer: external monitor                       Assessment:   Viki Busch is a 39w2d pregnant female admitted with induction of labor, indication GDM A2 and elderly multigravida.          Plan:   Admit - see IP orders  Labor induction with cytotec for 6 doses and then pitocin if needed  Encourage ambulation, using the ball  Anticipate JADYN Reza MD  2024  8:25 AM

## 2024-02-26 NOTE — PLAN OF CARE
Patient discharged at 1:24 PM via ambulation accompanied by spouse and staff. Prescriptions sent to patients preferred pharmacy. All belongings sent with patient. Discharge instructions reviewed with patient. Listed belongings gathered and returned to patient. Patient discharged to home, and will return in morning for induction of labor.

## 2024-02-26 NOTE — PLAN OF CARE
Data: Patient admitted to room 4242 at 0736. Patient is a . Prenatal record reviewed.   OB History    Para Term  AB Living   6 4 4 0 1 4   SAB IAB Ectopic Multiple Live Births   1 0 0 0 1      # Outcome Date GA Lbr Chi/2nd Weight Sex Delivery Anes PTL Lv   6 Current            5 Term 22 39w0d 01:14 / 00:06 3.385 kg (7 lb 7.4 oz) M Vag-Spont INT N TAMAR      Name: LINDA MOTA-HEATH      Apgar1: 9  Apgar5: 9   4 Term 03/15/18 40w2d 21:50 / 00:27 3.805 kg (8 lb 6.2 oz) F Vag-Spont INT N       Name: SVETLANABABY1 HEATH      Apgar1: 7  Apgar5: 8   3 SAB            2 Term            1 Term               Obstetric Comments   Breast and bottle fed   .  Medical History:   Past Medical History:   Diagnosis Date    Abnormal cervical Papanicolaou smear     Herpes zoster without complication 2021    right hip, not treated    Infection due to  novel coronavirus 2021    PCOS (polycystic ovarian syndrome) 2017    Premenstrual syndrome 2017   .  Gestational age 39w2d. Vital signs per doc flowsheet. Fetal movement present. Patient reports Induction Of Labor   as reason for admission. Support persons is present.  Action: Verbal consent for EFM, external fetal monitors applied. Admission assessment completed. Patient and support persons educated on labor process. Patient instructed to report change in fetal movement, contractions, vaginal leaking of fluid or bleeding, abdominal pain, or any concerns related to the pregnancy to her nurse/physician. Patient oriented to room, call light in reach.   Response: Dr. Reza informed of arrival. Provider at bedside. Plan per provider is to monitor patient and encourage ambulation. Patient verbalized understanding of education and verbalized agreement with plan. Patient currently not laboring.

## 2024-02-26 NOTE — DISCHARGE INSTRUCTIONS
Discharge Instructions for Undelivered Patients    Diet:  * Drink 8 to 12 glasses of liquids (milk, juice, water) every day  * You may eat meals and snacks.    Activity:  * Count fetal kicks every day.  * Call your doctor if your baby is moving less than usual.    Call your provider if you notice:  * Swelling in your face or increased swelling in your hands or legs.  * Headaches that are not relieved by Tylenol (acetaminophen).  * Changes in your vision (blurring; seeing spots or stars).  * Nausea (sick to your stomach) and vomiting (throwing up).  * Weight gain of 5 pounds per week.  * Heartburn that doesn't go away.  * Signs of bladder infection: Pain when you urinate (use the toilet), needing to go more often or more urgently.  * The bag of peters (membrane) breaks, or you notice leaking in your underwear.  * Bright red blood in your underwear.  * Abdominal (lower belly) or stomach pain.  * Second (plus) baby: Contractions (tightenings) less than 10 minutes apart and getting stronger.  * Increase or change in vaginal discharge (note the color and amount).      [facility]: [phone]

## 2024-02-27 ENCOUNTER — ANESTHESIA EVENT (OUTPATIENT)
Dept: OBGYN | Facility: HOSPITAL | Age: 39
End: 2024-02-27
Payer: COMMERCIAL

## 2024-02-27 ENCOUNTER — ANESTHESIA (OUTPATIENT)
Dept: OBGYN | Facility: HOSPITAL | Age: 39
End: 2024-02-27
Payer: COMMERCIAL

## 2024-02-27 ENCOUNTER — HOSPITAL ENCOUNTER (INPATIENT)
Facility: HOSPITAL | Age: 39
LOS: 2 days | Discharge: HOME | End: 2024-02-29
Attending: FAMILY MEDICINE | Admitting: FAMILY MEDICINE
Payer: COMMERCIAL

## 2024-02-27 DIAGNOSIS — O24.414 INSULIN CONTROLLED GESTATIONAL DIABETES MELLITUS (GDM) IN THIRD TRIMESTER: Primary | ICD-10-CM

## 2024-02-27 LAB
ERYTHROCYTE [DISTWIDTH] IN BLOOD BY AUTOMATED COUNT: 13.7 % (ref 10–15)
GLUCOSE BLDC GLUCOMTR-MCNC: 106 MG/DL (ref 70–99)
GLUCOSE BLDC GLUCOMTR-MCNC: 115 MG/DL (ref 70–99)
GLUCOSE BLDC GLUCOMTR-MCNC: 121 MG/DL (ref 70–99)
GLUCOSE BLDC GLUCOMTR-MCNC: 67 MG/DL (ref 70–99)
GLUCOSE BLDC GLUCOMTR-MCNC: 68 MG/DL (ref 70–99)
GLUCOSE BLDC GLUCOMTR-MCNC: 70 MG/DL (ref 70–99)
GLUCOSE BLDC GLUCOMTR-MCNC: 79 MG/DL (ref 70–99)
GLUCOSE BLDC GLUCOMTR-MCNC: 86 MG/DL (ref 70–99)
GLUCOSE BLDC GLUCOMTR-MCNC: 89 MG/DL (ref 70–99)
HCT VFR BLD AUTO: 37.6 % (ref 35–47)
HGB BLD-MCNC: 12.9 G/DL (ref 11.7–15.7)
HOLD SPECIMEN: NORMAL
MCH RBC QN AUTO: 30.9 PG (ref 26.5–33)
MCHC RBC AUTO-ENTMCNC: 34.3 G/DL (ref 31.5–36.5)
MCV RBC AUTO: 90 FL (ref 78–100)
PLATELET # BLD AUTO: 165 10E3/UL (ref 150–450)
RBC # BLD AUTO: 4.17 10E6/UL (ref 3.8–5.2)
WBC # BLD AUTO: 5 10E3/UL (ref 4–11)

## 2024-02-27 PROCEDURE — 250N000011 HC RX IP 250 OP 636: Performed by: NURSE ANESTHETIST, CERTIFIED REGISTERED

## 2024-02-27 PROCEDURE — 86780 TREPONEMA PALLIDUM: CPT | Performed by: FAMILY MEDICINE

## 2024-02-27 PROCEDURE — 250N000009 HC RX 250: Performed by: FAMILY MEDICINE

## 2024-02-27 PROCEDURE — 59400 OBSTETRICAL CARE: CPT | Performed by: STUDENT IN AN ORGANIZED HEALTH CARE EDUCATION/TRAINING PROGRAM

## 2024-02-27 PROCEDURE — 250N000013 HC RX MED GY IP 250 OP 250 PS 637: Performed by: FAMILY MEDICINE

## 2024-02-27 PROCEDURE — 59400 OBSTETRICAL CARE: CPT | Performed by: NURSE ANESTHETIST, CERTIFIED REGISTERED

## 2024-02-27 PROCEDURE — 00HU33Z INSERTION OF INFUSION DEVICE INTO SPINAL CANAL, PERCUTANEOUS APPROACH: ICD-10-PCS | Performed by: STUDENT IN AN ORGANIZED HEALTH CARE EDUCATION/TRAINING PROGRAM

## 2024-02-27 PROCEDURE — 120N000001 HC R&B MED SURG/OB

## 2024-02-27 PROCEDURE — 36415 COLL VENOUS BLD VENIPUNCTURE: CPT | Performed by: FAMILY MEDICINE

## 2024-02-27 PROCEDURE — 85027 COMPLETE CBC AUTOMATED: CPT | Performed by: FAMILY MEDICINE

## 2024-02-27 PROCEDURE — 722N000001 HC LABOR CARE VAGINAL DELIVERY SINGLE

## 2024-02-27 PROCEDURE — 250N000009 HC RX 250: Performed by: STUDENT IN AN ORGANIZED HEALTH CARE EDUCATION/TRAINING PROGRAM

## 2024-02-27 PROCEDURE — 258N000003 HC RX IP 258 OP 636: Performed by: FAMILY MEDICINE

## 2024-02-27 RX ORDER — SODIUM CHLORIDE 9 MG/ML
INJECTION, SOLUTION INTRAVENOUS CONTINUOUS
Status: DISCONTINUED | OUTPATIENT
Start: 2024-02-27 | End: 2024-02-27 | Stop reason: HOSPADM

## 2024-02-27 RX ORDER — OXYTOCIN/0.9 % SODIUM CHLORIDE 30/500 ML
100-340 PLASTIC BAG, INJECTION (ML) INTRAVENOUS CONTINUOUS PRN
Status: DISCONTINUED | OUTPATIENT
Start: 2024-02-27 | End: 2024-02-29 | Stop reason: HOSPADM

## 2024-02-27 RX ORDER — NALOXONE HYDROCHLORIDE 0.4 MG/ML
0.4 INJECTION, SOLUTION INTRAMUSCULAR; INTRAVENOUS; SUBCUTANEOUS
Status: DISCONTINUED | OUTPATIENT
Start: 2024-02-27 | End: 2024-02-27 | Stop reason: HOSPADM

## 2024-02-27 RX ORDER — DOCUSATE SODIUM 100 MG/1
100 CAPSULE, LIQUID FILLED ORAL DAILY
Status: DISCONTINUED | OUTPATIENT
Start: 2024-02-27 | End: 2024-02-29 | Stop reason: HOSPADM

## 2024-02-27 RX ORDER — LIDOCAINE 40 MG/G
CREAM TOPICAL
Status: DISCONTINUED | OUTPATIENT
Start: 2024-02-27 | End: 2024-02-27 | Stop reason: HOSPADM

## 2024-02-27 RX ORDER — BUPIVACAINE HYDROCHLORIDE 2.5 MG/ML
INJECTION, SOLUTION EPIDURAL; INFILTRATION; INTRACAUDAL
Status: COMPLETED | OUTPATIENT
Start: 2024-02-27 | End: 2024-02-27

## 2024-02-27 RX ORDER — MODIFIED LANOLIN
OINTMENT (GRAM) TOPICAL
Status: DISCONTINUED | OUTPATIENT
Start: 2024-02-27 | End: 2024-02-29 | Stop reason: HOSPADM

## 2024-02-27 RX ORDER — ONDANSETRON 2 MG/ML
4 INJECTION INTRAMUSCULAR; INTRAVENOUS EVERY 6 HOURS PRN
Status: DISCONTINUED | OUTPATIENT
Start: 2024-02-27 | End: 2024-02-27 | Stop reason: HOSPADM

## 2024-02-27 RX ORDER — LOPERAMIDE HCL 2 MG
4 CAPSULE ORAL
Status: DISCONTINUED | OUTPATIENT
Start: 2024-02-27 | End: 2024-02-29 | Stop reason: HOSPADM

## 2024-02-27 RX ORDER — BISACODYL 10 MG
10 SUPPOSITORY, RECTAL RECTAL DAILY PRN
Status: DISCONTINUED | OUTPATIENT
Start: 2024-02-27 | End: 2024-02-29 | Stop reason: HOSPADM

## 2024-02-27 RX ORDER — TRANEXAMIC ACID 10 MG/ML
1 INJECTION, SOLUTION INTRAVENOUS EVERY 30 MIN PRN
Status: DISCONTINUED | OUTPATIENT
Start: 2024-02-27 | End: 2024-02-29 | Stop reason: HOSPADM

## 2024-02-27 RX ORDER — OXYTOCIN 10 [USP'U]/ML
10 INJECTION, SOLUTION INTRAMUSCULAR; INTRAVENOUS
Status: DISCONTINUED | OUTPATIENT
Start: 2024-02-27 | End: 2024-02-27 | Stop reason: HOSPADM

## 2024-02-27 RX ORDER — FENTANYL CITRATE 50 UG/ML
25 INJECTION, SOLUTION INTRAMUSCULAR; INTRAVENOUS ONCE
Status: DISCONTINUED | OUTPATIENT
Start: 2024-02-27 | End: 2024-02-27 | Stop reason: HOSPADM

## 2024-02-27 RX ORDER — LOPERAMIDE HCL 2 MG
2 CAPSULE ORAL
Status: DISCONTINUED | OUTPATIENT
Start: 2024-02-27 | End: 2024-02-27 | Stop reason: HOSPADM

## 2024-02-27 RX ORDER — MISOPROSTOL 200 UG/1
400 TABLET ORAL
Status: DISCONTINUED | OUTPATIENT
Start: 2024-02-27 | End: 2024-02-27 | Stop reason: HOSPADM

## 2024-02-27 RX ORDER — METOCLOPRAMIDE HYDROCHLORIDE 5 MG/ML
10 INJECTION INTRAMUSCULAR; INTRAVENOUS EVERY 6 HOURS PRN
Status: DISCONTINUED | OUTPATIENT
Start: 2024-02-27 | End: 2024-02-27 | Stop reason: HOSPADM

## 2024-02-27 RX ORDER — CITRIC ACID/SODIUM CITRATE 334-500MG
30 SOLUTION, ORAL ORAL
Status: DISCONTINUED | OUTPATIENT
Start: 2024-02-27 | End: 2024-02-27 | Stop reason: HOSPADM

## 2024-02-27 RX ORDER — PROCHLORPERAZINE 25 MG
25 SUPPOSITORY, RECTAL RECTAL EVERY 12 HOURS PRN
Status: DISCONTINUED | OUTPATIENT
Start: 2024-02-27 | End: 2024-02-27 | Stop reason: HOSPADM

## 2024-02-27 RX ORDER — OXYTOCIN 10 [USP'U]/ML
10 INJECTION, SOLUTION INTRAMUSCULAR; INTRAVENOUS
Status: DISCONTINUED | OUTPATIENT
Start: 2024-02-27 | End: 2024-02-29 | Stop reason: HOSPADM

## 2024-02-27 RX ORDER — DEXTROSE MONOHYDRATE 25 G/50ML
25-50 INJECTION, SOLUTION INTRAVENOUS
Status: DISCONTINUED | OUTPATIENT
Start: 2024-02-27 | End: 2024-02-27 | Stop reason: HOSPADM

## 2024-02-27 RX ORDER — SODIUM CHLORIDE, SODIUM LACTATE, POTASSIUM CHLORIDE, CALCIUM CHLORIDE 600; 310; 30; 20 MG/100ML; MG/100ML; MG/100ML; MG/100ML
INJECTION, SOLUTION INTRAVENOUS CONTINUOUS PRN
Status: DISCONTINUED | OUTPATIENT
Start: 2024-02-27 | End: 2024-02-27 | Stop reason: HOSPADM

## 2024-02-27 RX ORDER — NALOXONE HYDROCHLORIDE 0.4 MG/ML
0.2 INJECTION, SOLUTION INTRAMUSCULAR; INTRAVENOUS; SUBCUTANEOUS
Status: DISCONTINUED | OUTPATIENT
Start: 2024-02-27 | End: 2024-02-27 | Stop reason: HOSPADM

## 2024-02-27 RX ORDER — ACETAMINOPHEN 325 MG/1
650 TABLET ORAL EVERY 4 HOURS PRN
Status: DISCONTINUED | OUTPATIENT
Start: 2024-02-27 | End: 2024-02-29 | Stop reason: HOSPADM

## 2024-02-27 RX ORDER — METHYLERGONOVINE MALEATE 0.2 MG/ML
200 INJECTION INTRAVENOUS
Status: DISCONTINUED | OUTPATIENT
Start: 2024-02-27 | End: 2024-02-29 | Stop reason: HOSPADM

## 2024-02-27 RX ORDER — MISOPROSTOL 200 UG/1
400 TABLET ORAL
Status: DISCONTINUED | OUTPATIENT
Start: 2024-02-27 | End: 2024-02-29 | Stop reason: HOSPADM

## 2024-02-27 RX ORDER — IBUPROFEN 800 MG/1
800 TABLET, FILM COATED ORAL EVERY 6 HOURS PRN
Status: DISCONTINUED | OUTPATIENT
Start: 2024-02-27 | End: 2024-02-29 | Stop reason: HOSPADM

## 2024-02-27 RX ORDER — KETOROLAC TROMETHAMINE 30 MG/ML
30 INJECTION, SOLUTION INTRAMUSCULAR; INTRAVENOUS
Status: DISCONTINUED | OUTPATIENT
Start: 2024-02-27 | End: 2024-02-27

## 2024-02-27 RX ORDER — ONDANSETRON 2 MG/ML
4 INJECTION INTRAMUSCULAR; INTRAVENOUS EVERY 6 HOURS PRN
Status: DISCONTINUED | OUTPATIENT
Start: 2024-02-27 | End: 2024-02-27

## 2024-02-27 RX ORDER — METOCLOPRAMIDE 10 MG/1
10 TABLET ORAL EVERY 6 HOURS PRN
Status: DISCONTINUED | OUTPATIENT
Start: 2024-02-27 | End: 2024-02-27 | Stop reason: HOSPADM

## 2024-02-27 RX ORDER — CARBOPROST TROMETHAMINE 250 UG/ML
250 INJECTION, SOLUTION INTRAMUSCULAR
Status: DISCONTINUED | OUTPATIENT
Start: 2024-02-27 | End: 2024-02-27 | Stop reason: HOSPADM

## 2024-02-27 RX ORDER — TRANEXAMIC ACID 10 MG/ML
1 INJECTION, SOLUTION INTRAVENOUS EVERY 30 MIN PRN
Status: DISCONTINUED | OUTPATIENT
Start: 2024-02-27 | End: 2024-02-27 | Stop reason: HOSPADM

## 2024-02-27 RX ORDER — METHYLERGONOVINE MALEATE 0.2 MG/ML
200 INJECTION INTRAVENOUS
Status: DISCONTINUED | OUTPATIENT
Start: 2024-02-27 | End: 2024-02-27 | Stop reason: HOSPADM

## 2024-02-27 RX ORDER — NALBUPHINE HYDROCHLORIDE 20 MG/ML
2.5-5 INJECTION, SOLUTION INTRAMUSCULAR; INTRAVENOUS; SUBCUTANEOUS EVERY 6 HOURS PRN
Status: DISCONTINUED | OUTPATIENT
Start: 2024-02-27 | End: 2024-02-29 | Stop reason: HOSPADM

## 2024-02-27 RX ORDER — ONDANSETRON 4 MG/1
4 TABLET, ORALLY DISINTEGRATING ORAL EVERY 6 HOURS PRN
Status: DISCONTINUED | OUTPATIENT
Start: 2024-02-27 | End: 2024-02-27 | Stop reason: HOSPADM

## 2024-02-27 RX ORDER — ONDANSETRON 4 MG/1
4 TABLET, ORALLY DISINTEGRATING ORAL EVERY 6 HOURS PRN
Status: DISCONTINUED | OUTPATIENT
Start: 2024-02-27 | End: 2024-02-27

## 2024-02-27 RX ORDER — FENTANYL CITRATE 50 UG/ML
INJECTION, SOLUTION INTRAMUSCULAR; INTRAVENOUS
Status: COMPLETED | OUTPATIENT
Start: 2024-02-27 | End: 2024-02-27

## 2024-02-27 RX ORDER — IBUPROFEN 800 MG/1
800 TABLET, FILM COATED ORAL
Status: DISCONTINUED | OUTPATIENT
Start: 2024-02-27 | End: 2024-02-27

## 2024-02-27 RX ORDER — NICOTINE POLACRILEX 4 MG
15-30 LOZENGE BUCCAL
Status: DISCONTINUED | OUTPATIENT
Start: 2024-02-27 | End: 2024-02-27 | Stop reason: HOSPADM

## 2024-02-27 RX ORDER — FENTANYL CITRATE-0.9 % NACL/PF 10 MCG/ML
100 PLASTIC BAG, INJECTION (ML) INTRAVENOUS EVERY 5 MIN PRN
Status: DISCONTINUED | OUTPATIENT
Start: 2024-02-27 | End: 2024-02-27 | Stop reason: HOSPADM

## 2024-02-27 RX ORDER — CARBOPROST TROMETHAMINE 250 UG/ML
250 INJECTION, SOLUTION INTRAMUSCULAR
Status: DISCONTINUED | OUTPATIENT
Start: 2024-02-27 | End: 2024-02-29 | Stop reason: HOSPADM

## 2024-02-27 RX ORDER — HYDROCORTISONE 25 MG/G
CREAM TOPICAL 3 TIMES DAILY PRN
Status: DISCONTINUED | OUTPATIENT
Start: 2024-02-27 | End: 2024-02-29 | Stop reason: HOSPADM

## 2024-02-27 RX ORDER — OXYTOCIN/0.9 % SODIUM CHLORIDE 30/500 ML
340 PLASTIC BAG, INJECTION (ML) INTRAVENOUS CONTINUOUS PRN
Status: DISCONTINUED | OUTPATIENT
Start: 2024-02-27 | End: 2024-02-27 | Stop reason: HOSPADM

## 2024-02-27 RX ORDER — PROCHLORPERAZINE MALEATE 10 MG
10 TABLET ORAL EVERY 6 HOURS PRN
Status: DISCONTINUED | OUTPATIENT
Start: 2024-02-27 | End: 2024-02-27 | Stop reason: HOSPADM

## 2024-02-27 RX ORDER — BUPIVACAINE HYDROCHLORIDE 2.5 MG/ML
1 INJECTION, SOLUTION EPIDURAL; INFILTRATION; INTRACAUDAL ONCE
Status: DISCONTINUED | OUTPATIENT
Start: 2024-02-27 | End: 2024-02-27 | Stop reason: HOSPADM

## 2024-02-27 RX ORDER — OXYTOCIN/0.9 % SODIUM CHLORIDE 30/500 ML
1-24 PLASTIC BAG, INJECTION (ML) INTRAVENOUS CONTINUOUS
Status: DISCONTINUED | OUTPATIENT
Start: 2024-02-27 | End: 2024-02-27 | Stop reason: HOSPADM

## 2024-02-27 RX ORDER — MULTIVIT WITH MINERALS/LUTEIN
1000 TABLET ORAL DAILY
COMMUNITY
End: 2024-04-11

## 2024-02-27 RX ORDER — OXYTOCIN/0.9 % SODIUM CHLORIDE 30/500 ML
340 PLASTIC BAG, INJECTION (ML) INTRAVENOUS CONTINUOUS PRN
Status: DISCONTINUED | OUTPATIENT
Start: 2024-02-27 | End: 2024-02-29 | Stop reason: HOSPADM

## 2024-02-27 RX ORDER — LOPERAMIDE HCL 2 MG
2 CAPSULE ORAL
Status: DISCONTINUED | OUTPATIENT
Start: 2024-02-27 | End: 2024-02-29 | Stop reason: HOSPADM

## 2024-02-27 RX ORDER — LOPERAMIDE HCL 2 MG
4 CAPSULE ORAL
Status: DISCONTINUED | OUTPATIENT
Start: 2024-02-27 | End: 2024-02-27 | Stop reason: HOSPADM

## 2024-02-27 RX ADMIN — FENTANYL CITRATE 25 MCG: 50 INJECTION INTRAMUSCULAR; INTRAVENOUS at 13:40

## 2024-02-27 RX ADMIN — MISOPROSTOL 20 MCG: 200 TABLET ORAL at 07:54

## 2024-02-27 RX ADMIN — MISOPROSTOL 40 MCG: 200 TABLET ORAL at 09:57

## 2024-02-27 RX ADMIN — BUPIVACAINE HYDROCHLORIDE 1.5 ML: 2.5 INJECTION, SOLUTION EPIDURAL; INFILTRATION; INTRACAUDAL at 13:40

## 2024-02-27 RX ADMIN — Medication 2 MILLI-UNITS/MIN: at 14:16

## 2024-02-27 RX ADMIN — MISOPROSTOL 40 MCG: 200 TABLET ORAL at 10:59

## 2024-02-27 RX ADMIN — MISOPROSTOL 20 MCG: 200 TABLET ORAL at 09:00

## 2024-02-27 RX ADMIN — MISOPROSTOL 800 MCG: 200 TABLET ORAL at 17:54

## 2024-02-27 RX ADMIN — Medication 340 ML/HR: at 17:38

## 2024-02-27 RX ADMIN — SODIUM CHLORIDE, POTASSIUM CHLORIDE, SODIUM LACTATE AND CALCIUM CHLORIDE 1000 ML: 600; 310; 30; 20 INJECTION, SOLUTION INTRAVENOUS at 13:07

## 2024-02-27 RX ADMIN — SODIUM CHLORIDE, POTASSIUM CHLORIDE, SODIUM LACTATE AND CALCIUM CHLORIDE: 600; 310; 30; 20 INJECTION, SOLUTION INTRAVENOUS at 14:16

## 2024-02-27 RX ADMIN — BUPIVACAINE HYDROCHLORIDE 1.2 ML: 2.5 INJECTION, SOLUTION EPIDURAL; INFILTRATION; INTRACAUDAL at 16:47

## 2024-02-27 ASSESSMENT — ACTIVITIES OF DAILY LIVING (ADL)
ADLS_ACUITY_SCORE: 18

## 2024-02-27 NOTE — H&P
[facility] Labor and Delivery History and Physical    Heath Mota MRN# [id number]   Age: 38 year old YOB: 1985     Date of Admission:  2024    Primary care provider: Ludy Reza provider:  Narda Winter           Chief Complaint:   Heath Mota is a 38 year old female who is 39w3d pregnant and being admitted for induction of labor, indication Gestational diabetes A2 that is well controlled on insulin. Prenatal record/H and P reviewed with patient and unchanged.  Patient had BPP done yesterday  and was 8/8.  Patient reports feeling well.  Denies any cramping, LOF or bleeding.  First dose of oral cytotec given for induction of labor.  No appreciable contractions at this time.           Pregnancy history:     OBSTETRIC HISTORY:    OB History    Para Term  AB Living   6 4 4 0 1 4   SAB IAB Ectopic Multiple Live Births   1 0 0 0 1      # Outcome Date GA Lbr Chi/2nd Weight Sex Delivery Anes PTL Lv   6 Current            5 Term 22 39w0d 01:14 / 00:06 3.385 kg (7 lb 7.4 oz) M Vag-Spont INT N TAMAR      Name: SVETLANAMALE-HEATH      Apgar1: 9  Apgar5: 9   4 Term 03/15/18 40w2d 21:50 / 00:27 3.805 kg (8 lb 6.2 oz) F Vag-Spont INT N       Name: CRISTAL MOTA1 HEATH      Apgar1: 7  Apgar5: 8   3 SAB            2 Term            1 Term               Obstetric Comments   Breast and bottle fed       EDC: Estimated Date of Delivery: Mar 2, 2024    Prenatal Labs:   Lab Results   Component Value Date    ABO A 2018    RH Pos 2018    AS Negative 2024    HEPBANG Nonreactive 2023    CHPCRT Negative 2017    GCPCRT Negative 2017    TREPAB Negative 2018    HGB 12.9 2024       GBS Status:   Lab Results   Component Value Date    GBS Negative 2018       Active Problem List  Patient Active Problem List   Diagnosis    PCOS (polycystic ovarian syndrome)    Premenstrual syndrome    Hypovitaminosis D    Low serum  progesterone    Galactorrhea    Procreative counseling and advice using natural family planning    Ectropion of cervix    AMA (advanced maternal age) multigravida 35+, unspecified trimester    Supervision of other high risk pregnancy, antepartum    ACP (advance care planning)    Excessive fetal growth affecting management of pregnancy in third trimester, single or unspecified fetus     (normal spontaneous vaginal delivery)    Insulin controlled gestational diabetes mellitus (GDM) in third trimester    Pregnancy       Medication Prior to Admission  Medications Prior to Admission   Medication Sig Dispense Refill Last Dose    hydrOXYzine aakash (VISTARIL) 25 MG capsule Take 1-2 capsules (25-50 mg) by mouth at bedtime as needed, may repeat once for other (sleep) 40 capsule 1 Past Month    insulin  UNIT/ML injection Prime needle with 2 units, then inject 26 units under the skin. Dose adjusting per [facility]. 15 mL 0 2024 at     Prenatal Vit-Fe Fumarate-FA (PRENATAL MULTIVITAMIN W/IRON) 27-0.8 MG tablet Take 1 tablet by mouth daily 90 tablet 3 2024 at     vitamin C (ASCORBIC ACID) 1000 MG TABS Take 1,000 mg by mouth daily   2024 at     insulin pen needle (32G X 4 MM) 32G X 4 MM miscellaneous Use 1 pen needle daily 100 each 3    .        Maternal Past Medical History:     Past Medical History:   Diagnosis Date    Abnormal cervical Papanicolaou smear     Herpes zoster without complication 2021    right hip, not treated    Infection due to 2019 novel coronavirus 2021    PCOS (polycystic ovarian syndrome) 2017    Premenstrual syndrome 2017                       Family History:   Unchanged from prenatal record            Social History:   Unchanged from prenatal record         Review of Systems:   Per HPI.  Other systems reviewed and negative         Physical Exam:     Vitals:    24 0611 24 0616 24 0710   BP: 127/73  112/73   BP Location: Left arm  Left arm  "  Patient Position: Semi-Ware's  Semi-Ware's   Cuff Size: Adult Regular  Adult Regular   Pulse: 80  75   Resp: 16  18   Temp: 97.5  F (36.4  C)  98  F (36.7  C)   TempSrc: Oral  Oral   SpO2: 100%  98%   Weight:  85.7 kg (189 lb)    Height:  1.727 m (5' 8\")      Chest: CTA  CV:  RRR without murmers  General:  Alert and oriented  Abdomen soft, non-tender, gravid  Ext: neg  Cervix:   Membranes: intact   Dilation: 2   Effacement: 40%   Station:-2   Consistency: Average   Position: Posterior  Presentation:Cephalic.  Position confirmed by bedside ultrasound  Fetal Heart Rate Tracing: reactive and reassuring  Tocometer: external monitor                       Assessment:   Viki Busch is a 39w3d pregnant female who is a grand multipara at  who has been admitted today with IOL, indication gestation diabetes A2.        Plan:   Admit, Routine intrapartum care and monitoring per protocol.  Oral pitocin protocol for induction of labor followed by pitocin for augmentation as needed.  POC glucose for monitoring of GDM  Up ad gene, encourage ambulation and using peanut ball  NPO when in active labor  Will obtain consent for neuraxial analgesia  Anticipate  with no complications     Narda Winter MD     "

## 2024-02-27 NOTE — ANESTHESIA PROCEDURE NOTES
"Intrathecal injection Procedure Note    Pre-Procedure   Staff -        CRNA: Jorge Alberto Snow APRN CRNA       Performed By: CRNA       Location: OR       Procedure Start/Stop Times: 2/27/2024 1:40 PM and 2/27/2024 1:45 PM       Pre-Anesthestic Checklist: patient identified, IV checked, risks and benefits discussed, informed consent, monitors and equipment checked, pre-op evaluation, at physician/surgeon's request and post-op pain management  Timeout:       Correct Patient: Yes        Correct Procedure: Yes        Correct Site: Yes        Correct Position: Yes   Procedure Documentation  Procedure: intrathecal injection       Diagnosis: labor pain       Patient Position: sitting       Patient Prep/Sterile Barriers: sterile gloves, mask, patient draped       Skin prep: Betadine       Insertion Site: L3-4. (midline approach).       Needle Gauge: 25.        Needle Length (Inches): 3.5        Spinal Needle Type: Lesly tip       Introducer used       Introducer: 20 G       # of attempts: 1 and  # of redirects:     Assessment/Narrative         Paresthesias: No.       CSF fluid: clear.    Medication(s) Administered   0.25% PF Bupivacaine (Intrathecal) - Intrathecal, Back   1.5 mL - 2/27/2024 1:40:00 PM  Fentanyl PF (Intrathecal) - Intrathecal, Back   25 mcg - 2/27/2024 1:40:00 PM  Medication Administration Time: 2/27/2024 1:40 PM      FOR [facility] ([facility]) ONLY:   Pain Team Contact information: please page the Pain Team [address] [place]. Search \"Pain\". During daytime hours, please page the attending first. At night please page the resident first.      "

## 2024-02-27 NOTE — PLAN OF CARE
Face to face report given with opportunity to observe patient.    Report given to CARMEN Arias.    Monserrat Estrada RN   2/27/2024  7:06 AM

## 2024-02-27 NOTE — ANESTHESIA POSTPROCEDURE EVALUATION
Patient: Viki Busch    Procedure: * No procedures listed *       Anesthesia Type:  Spinal    Note:  Disposition: Inpatient   Postop Pain Control: Uneventful            Sign Out: Well controlled pain   PONV: No   Neuro/Psych: Uneventful            Sign Out: Acceptable/Baseline neuro status   Airway/Respiratory: Uneventful            Sign Out: Acceptable/Baseline resp. status   CV/Hemodynamics: Uneventful            Sign Out: Acceptable CV status; No obvious hypovolemia; No obvious fluid overload   Other NRE: NONE   DID A NON-ROUTINE EVENT OCCUR? No       Last vitals:  Vitals:    02/27/24 1709 02/27/24 1710 02/27/24 1711   BP: 129/74  140/64   Pulse:      Resp:      Temp:      SpO2:  96%        Electronically Signed By: HALLE Garcia CRNA  February 27, 2024  5:26 PM

## 2024-02-27 NOTE — PLAN OF CARE
Data: Patient admitted to room 4242 at 0605 for IOL. Patient is a . Prenatal record reviewed.   OB History    Para Term  AB Living   6 4 4 0 1 4   SAB IAB Ectopic Multiple Live Births   1 0 0 0 1      # Outcome Date GA Lbr Chi/2nd Weight Sex Delivery Anes PTL Lv   6 Current            5 Term 22 39w0d 01:14 / 00:06 3.385 kg (7 lb 7.4 oz) M Vag-Spont INT N TAMAR      Name: LINDA MOTA-HEATH      Apgar1: 9  Apgar5: 9   4 Term 03/15/18 40w2d 21:50 / 00:27 3.805 kg (8 lb 6.2 oz) F Vag-Spont INT N       Name: CRISTAL MOTA1 HEATH      Apgar1: 7  Apgar5: 8   3 SAB            2 Term            1 Term               Obstetric Comments   Breast and bottle fed   .  Medical History:   Past Medical History:   Diagnosis Date    Abnormal cervical Papanicolaou smear     Herpes zoster without complication 2021    right hip, not treated    Infection due to 2019 novel coronavirus 2021    PCOS (polycystic ovarian syndrome) 2017    Premenstrual syndrome 2017   .  Gestational age 39w3d. Vital signs per doc flowsheet. Fetal movement present. Patient reports scheduled IOL as reason for admission.  Action: Verbal consent for EFM, external fetal monitors applied. Admission assessment completed. Patient educated on labor process. Patient instructed to report change in fetal movement, contractions, vaginal leaking of fluid or bleeding, abdominal pain, or any concerns related to the pregnancy to her nurse/physician. Patient oriented to room, call light in reach.   Response: Dr. Reza informed of pt's arrival for scheduled IOL. Orders pending. Patient verbalized understanding of education and verbalized agreement with plan.

## 2024-02-27 NOTE — ANESTHESIA PROCEDURE NOTES
"Intrathecal injection Procedure Note    Pre-Procedure   Staff -        CRNA: Jorge Alberto Snow APRN CRNA       Performed By: CRNA       Location: OR       Procedure Start/Stop Times: 2/27/2024 4:47 PM and 2/27/2024 4:50 PM       Pre-Anesthestic Checklist: patient identified, IV checked, risks and benefits discussed, informed consent, monitors and equipment checked, pre-op evaluation, at physician/surgeon's request and post-op pain management  Timeout:       Correct Patient: Yes        Correct Procedure: Yes        Correct Site: Yes        Correct Position: Yes   Procedure Documentation  Procedure: intrathecal injection       Diagnosis: labor pain       Patient Position: sitting       Patient Prep/Sterile Barriers: sterile gloves, mask, patient draped       Skin prep: Betadine       Insertion Site: L3-4. (midline approach).       Needle Gauge: 25.        Needle Length (Inches): 3.5        Spinal Needle Type: Lesly tip       Introducer used       Introducer: 20 G       # of attempts: 1 and  # of redirects:     Assessment/Narrative         Paresthesias: No.       CSF fluid: clear.    Medication(s) Administered   0.25% PF Bupivacaine (Intrathecal) - Intrathecal   1.2 mL - 2/27/2024 4:47:00 PM  Medication Administration Time: 2/27/2024 4:47 PM      FOR [facility]) ONLY:   Pain Team Contact information: please page the Pain Team Via [employer]. Search \"Pain\". During daytime hours, please page the attending first. At night please page the resident first.      " Rinvoq Counseling: I discussed with the patient the risks of Rinvoq therapy including but not limited to upper respiratory tract infections, shingles, cold sores, bronchitis, nausea, cough, fever, acne, and headache. Live vaccines should be avoided.  This medication has been linked to serious infections; higher rate of mortality; malignancy and lymphoproliferative disorders; major adverse cardiovascular events; thrombosis; thrombocytopenia, anemia, and neutropenia; lipid elevations; liver enzyme elevations; and gastrointestinal perforations.

## 2024-02-27 NOTE — PROGRESS NOTES
(S)Patient lying comfortably.  Endorsing contractions at regular intervals which are mild.    (O)  Vitals:    24 1645 24 1649 24 1650 24 1651   BP: 126/63 136/75  139/77   BP Location:       Patient Position:       Cuff Size:       Pulse:       Resp:       Temp:       TempSrc:       SpO2: 100%  99%    Weight:       Height:         Cervix:   Membranes: intact   Dilation: 4   Effacement: 70%   Station:-2    Fetal Heart Rate Tracing: reactive and reassuring  Cat:1          Tocometer: external monitor    (A/P)  Continue oral cytotec  Pitocin as needed for augmentation of labor  Up ad geen  Wants to hold off on epidural for now  Anticipate

## 2024-02-27 NOTE — PLAN OF CARE
Labor Shift Note  Data: See Flowsheets activity for contraction and fetal documentation. Signs and symptoms of infection Absent. Complications in labor: Absent. Support person is present.  Interventions: Continue uterine/fetal assessment per orders and nursing discretion. Vital Signs per order set. Comfort measures/pain control/labor management: Frequent position changes to facilitate labor with epidural anesthesia.  Anticipate   Continue labor augmentation with Pitocin  Labor Lounge  Plan: Anticipate . Provide labor/coping assistance as needed by patient and support person.  Observe for and notify care provider of indications of progressing labor, need for pain medications, or signs of fetal/maternal compromise.

## 2024-02-27 NOTE — ANESTHESIA CARE TRANSFER NOTE
Patient: Viki Busch    Procedure: * No procedures listed *       Diagnosis: * No pre-op diagnosis entered *  Diagnosis Additional Information: No value filed.    Anesthesia Type:   Spinal     Note:    Oropharynx: spontaneously breathing  Level of Consciousness: awake  Oxygen Supplementation: room air    Independent Airway: airway patency satisfactory and stable  Dentition: dentition unchanged  Vital Signs Stable: post-procedure vital signs reviewed and stable  Report to RN Given: handoff report given  Patient transferred to: Labor and Delivery    Handoff Report: Identifed the Patient, Identified the Reponsible Provider, Reviewed the pertinent medical history, Discussed the surgical course, Reviewed Intra-OP anesthesia mangement and issues during anesthesia, Set expectations for post-procedure period and Allowed opportunity for questions and acknowledgement of understanding  Vitals:  Vitals Value Taken Time   /77 02/27/24 1651   Temp     Pulse     Resp     SpO2 99 % 02/27/24 1650       Electronically Signed By: HALLE Garcia CRNA  February 27, 2024  4:53 PM

## 2024-02-27 NOTE — ANESTHESIA PREPROCEDURE EVALUATION
Anesthesia Pre-Procedure Evaluation    Patient: Viki Busch   MRN: [id number] : 1985        Procedure :           Past Medical History:   Diagnosis Date     Abnormal cervical Papanicolaou smear      Herpes zoster without complication 2021    right hip, not treated     Infection due to 2019 novel coronavirus 2021     PCOS (polycystic ovarian syndrome) 2017     Premenstrual syndrome 2017      Past Surgical History:   Procedure Laterality Date     HC TOOTH EXTRACTION W/FORCEP Bilateral 2016      No Known Allergies   Social History     Tobacco Use     Smoking status: Former     Packs/day: 0.20     Years: 6.00     Additional pack years: 0.00     Total pack years: 1.20     Types: Cigarettes     Passive exposure: Never     Smokeless tobacco: Never     Tobacco comments:     quit 2010   Substance Use Topics     Alcohol use: Not Currently     Comment: 2-3x/month      Wt Readings from Last 1 Encounters:   24 85.7 kg (189 lb)        Anesthesia Evaluation   Pt has had prior anesthetic. Type: Regional and OB Labor Epidural.    No history of anesthetic complications       ROS/MED HX  ENT/Pulmonary:       Neurologic:       Cardiovascular:       METS/Exercise Tolerance:     Hematologic:       Musculoskeletal:       GI/Hepatic:       Renal/Genitourinary:       Endo:     (+)                  gestational diabetes and Insulin,    Psychiatric/Substance Use:       Infectious Disease:       Malignancy:       Other:            Physical Exam    Airway        Mallampati: II   TM distance: > 3 FB   Neck ROM: full   Mouth opening: > 3 cm    Respiratory Devices and Support         Dental  no notable dental history         Cardiovascular   cardiovascular exam normal          Pulmonary   pulmonary exam normal            Other findings: Platelets >100  OUTSIDE LABS:  CBC:   Lab Results   Component Value Date    WBC 5.0 2024    WBC 6.5 2024    HGB 12.9 2024    HGB 13.7 2024    HCT 37.6  "02/27/2024    HCT 39.4 02/26/2024     02/27/2024     02/26/2024     BMP:   Lab Results   Component Value Date     01/08/2024    POTASSIUM 3.7 01/08/2024    CHLORIDE 105 01/08/2024    CO2 21 (L) 01/08/2024    BUN 14.3 01/08/2024    CR 0.91 01/08/2024     (H) 02/27/2024    GLC 70 02/27/2024     COAGS: No results found for: \"PTT\", \"INR\", \"FIBR\"  POC:   Lab Results   Component Value Date    HCG Positive (A) 10/07/2021     HEPATIC:   Lab Results   Component Value Date    ALBUMIN 3.8 01/08/2024    PROTTOTAL 6.4 01/08/2024    ALT 13 01/08/2024    AST 17 01/08/2024    ALKPHOS 79 01/08/2024    BILITOTAL 0.4 01/08/2024     OTHER:   Lab Results   Component Value Date    DEQUAN 8.9 01/08/2024    TSH 2.34 08/09/2023       Anesthesia Plan    ASA Status:  2       Anesthesia Type: Spinal.              Consents    Anesthesia Plan(s) and associated risks, benefits, and realistic alternatives discussed. Questions answered and patient/representative(s) expressed understanding.     - Discussed:     - Discussed with:  Patient            Postoperative Care            Comments:    Other Comments: Discussed risks and benefits of spinal anesthetic with patient including itching, sore back, infection, hematoma, spinal headache, CV complications, nerve damage, inability to place, conversion to general anesthesia, loss of airway, and death. Pt wishes to proceed.            Jorge Alberto Snow, APRN CRNA    I have reviewed the pertinent notes and labs in the chart from the past 30 days and (re)examined the patient.  Any updates or changes from those notes are reflected in this note.                  "

## 2024-02-27 NOTE — DISCHARGE SUMMARY
Discharge Summary    Viki Busch MRN# [id number]   YOB: 1985 Age: 38 year old     Date of Admission:  2024  Date of Discharge:  2024  Admitting Physician:  Ludy Reza MD  Discharge Physician:  [place] att. providers found  Discharging Service:  [facility]     Home clinic: [facility]  Primary Provider: Ludy Reza          Admission Diagnoses:   Pregnancy [Z34.90]          Discharge Diagnosis:     Patient Active Problem List   Diagnosis    PCOS (polycystic ovarian syndrome)    Premenstrual syndrome    Hypovitaminosis D    Low serum progesterone    Galactorrhea    Procreative counseling and advice using natural family planning    Ectropion of cervix    AMA (advanced maternal age) multigravida 35+, unspecified trimester    Supervision of other high risk pregnancy, antepartum    ACP (advance care planning)    Excessive fetal growth affecting management of pregnancy in third trimester, single or unspecified fetus     (normal spontaneous vaginal delivery)    Insulin controlled gestational diabetes mellitus (GDM) in third trimester    Pregnancy                Discharge Disposition:     Discharged to home           Condition on Discharge:     Discharge condition: Stable   Discharge vitals: Blood pressure 120/73, pulse 86, temperature 98.7  F (37.1  C), temperature source Oral, resp. rate 18, last menstrual period 2023, SpO2 98%, not currently breastfeeding.   Code status on discharge: Full Code           Procedures / Labs / Imaging:   No procedures performed during this admission          Medications Prior to Admission:     Medications Prior to Admission   Medication Sig Dispense Refill Last Dose    hydrOXYzine aakash (VISTARIL) 25 MG capsule Take 1-2 capsules (25-50 mg) by mouth at bedtime as needed, may repeat once for other (sleep) 40 capsule 1 Past Week    insulin  UNIT/ML injection Prime needle with 2 units, then inject 26 units under the skin. Dose  adjusting per [facility]. 15 mL 0 2/25/2024    Prenatal Vit-Fe Fumarate-FA (PRENATAL MULTIVITAMIN W/IRON) 27-0.8 MG tablet Take 1 tablet by mouth daily 90 tablet 3 2/25/2024    vitamin C (ASCORBIC ACID) 1000 MG TABS Take 1,000 mg by mouth daily       insulin pen needle (32G X 4 MM) 32G X 4 MM miscellaneous Use 1 pen needle daily 100 each 3              Discharge Medications:     No current facility-administered medications for this encounter.     No current outpatient medications on file.     Facility-Administered Medications Ordered in Other Encounters   Medication    carboprost (HEMABATE) injection 250 mcg    And    loperamide (IMODIUM) capsule 4 mg    ketorolac (TORADOL) injection 30 mg    Or    ketorolac (TORADOL) injection 30 mg    Or    ibuprofen (ADVIL/MOTRIN) tablet 800 mg    lactated ringers BOLUS 1,000 mL    Or    lactated ringers BOLUS 500 mL    lactated ringers infusion    lidocaine (LMX4) cream    lidocaine 1 % 0.1-1 mL    lidocaine 1 % 0.1-20 mL    loperamide (IMODIUM) capsule 2 mg    Medication Instructions - cervical ripening and induction medications    methylergonovine (METHERGINE) injection 200 mcg    metoclopramide (REGLAN) injection 10 mg    Or    metoclopramide (REGLAN) tablet 10 mg    misoprostol (CYTOTEC) tablet 400 mcg    Or    misoprostol (CYTOTEC) suppository 800 mcg    misoprostol solution 200 mcg    naloxone (NARCAN) injection 0.2 mg    Or    naloxone (NARCAN) injection 0.4 mg    Or    naloxone (NARCAN) injection 0.2 mg    Or    naloxone (NARCAN) injection 0.4 mg    ondansetron (ZOFRAN ODT) ODT tab 4 mg    Or    ondansetron (ZOFRAN) injection 4 mg    oxytocin (PITOCIN) 30 units in 500 mL 0.9% NaCl infusion    oxytocin (PITOCIN) 30 units in 500 mL 0.9% NaCl infusion    oxytocin (PITOCIN) injection 10 Units    oxytocin (PITOCIN) injection 10 Units    prochlorperazine (COMPAZINE) injection 10 mg    Or    prochlorperazine (COMPAZINE) tablet 10 mg    Or    prochlorperazine (COMPAZINE)  suppository 25 mg    sodium chloride (PF) 0.9% PF flush 3 mL    sodium chloride (PF) 0.9% PF flush 3 mL    sodium citrate-citric acid (BICITRA) solution 30 mL    tranexamic acid 1 g in 100 mL NS IV bag (premix)             Consultations:     No consultations were requested during this admission             Brief History of Illness:   Viki Busch is a 38 year old female who was admitted for induction but due to staffing issues, unable to start          Hospital Course:       Pregnancy    * No resolved hospital problems. *              Final Day of Progress before Discharge:       Assessment and Plan:  Principal Problem:    Pregnancy    Assessment: AMA and GDM A2 planned induction    Plan: unable to complete due to staffing issues, discharge to home and follow-up tomorrow for induction.                Physical Exam:  Vitals were reviewed  Blood pressure 120/73, pulse 86, temperature 98.7  F (37.1  C), temperature source Oral, resp. rate 18, last menstrual period 05/22/2023, SpO2 98%, not currently breastfeeding.  Constitutional:   awake, alert, cooperative, no apparent distress, and appears stated age     Lungs:   No increased work of breathing, good air exchange, clear to auscultation bilaterally, no crackles or wheezing     Cardiovascular:   Normal apical impulse, regular rate and rhythm, normal S1 and S2, no S3 or S4, and no murmur noted     Neuropsychiatric:   General: normal, calm, and normal eye contact  Affect: normal and pleasant     Skin:   no bruising or bleeding     Additional findings:   Abdomen:  soft, non-tender, non-distended, BS+          Data:  All laboratory data reviewed         Significant Results:     None             Pending Results:     None           Discharge Instructions and Follow-Up:     Discharge diet: Regular   Discharge activity: Activity as tolerated   Discharge follow-up: Follow-up tomorrow in hospital for induction   Outpatient therapy: None    Home Care agency: None    Supplies  and equipment: None   Lines and drains: None    Wound care: None         Ludy Reza MD

## 2024-02-27 NOTE — PLAN OF CARE
"Labor Shift Note  Data: See Flowsheets activity for contraction and fetal documentation.   Vitals:    24 0611 24 0616 24 0710   BP: 127/73  112/73   BP Location: Left arm  Left arm   Patient Position: Semi-Ware's  Semi-Ware's   Cuff Size: Adult Regular  Adult Regular   Pulse: 80  75   Resp: 16  18   Temp: 97.5  F (36.4  C)  98  F (36.7  C)   TempSrc: Oral  Oral   SpO2: 100%  98%   Weight:  85.7 kg (189 lb)    Height:  1.727 m (5' 8\")    Signs and symptoms of infection Absent. Complications in labor: Absent. Support person is present.  Interventions: Continue uterine/fetal assessment per orders and nursing discretion. Vital Signs per order set. Comfort measures/pain control/labor management: Pain medication options of Nitrous Oxide, Fentanyl IV and epidural reviewed with pt. Pt is interested in intrathecal, open to other pain management.   Labor induction with oral Misoprostol risks and benefits reviewed with pt. Agreeable to plan.  Plan: Anticipate . Provide labor/coping assistance as needed by patient and support person. Observe for and notify care provider of indications of progressing labor, need for pain medications, or signs of fetal/maternal compromise. Dr. Reza up to see patient this morning. RN notified of induction being transferred to the care of Dr. Winter.   "

## 2024-02-27 NOTE — PROVIDER NOTIFICATION
02/27/24 1235   Comments   Nursing Comments Dr. Logan on unit, MD made aware of vaginal bleeding and x1 golfball sized clot. Verbal order obtained to assess patients cervix

## 2024-02-28 LAB
HGB BLD-MCNC: 13.8 G/DL (ref 11.7–15.7)
HOLD SPECIMEN: NORMAL
T PALLIDUM AB SER QL: NONREACTIVE

## 2024-02-28 PROCEDURE — 250N000013 HC RX MED GY IP 250 OP 250 PS 637: Performed by: STUDENT IN AN ORGANIZED HEALTH CARE EDUCATION/TRAINING PROGRAM

## 2024-02-28 PROCEDURE — 36415 COLL VENOUS BLD VENIPUNCTURE: CPT | Performed by: STUDENT IN AN ORGANIZED HEALTH CARE EDUCATION/TRAINING PROGRAM

## 2024-02-28 PROCEDURE — 120N000001 HC R&B MED SURG/OB

## 2024-02-28 PROCEDURE — 85018 HEMOGLOBIN: CPT | Performed by: STUDENT IN AN ORGANIZED HEALTH CARE EDUCATION/TRAINING PROGRAM

## 2024-02-28 RX ADMIN — DOCUSATE SODIUM 100 MG: 100 CAPSULE, LIQUID FILLED ORAL at 09:02

## 2024-02-28 ASSESSMENT — ACTIVITIES OF DAILY LIVING (ADL)
ADLS_ACUITY_SCORE: 18

## 2024-02-28 NOTE — PROGRESS NOTES
[facility]   Post-Partum Progress Note            Interval History:     Doing well.  Pain is well-controlled.  No fevers.  No history of foul-smelling vaginal discharge.  Good appetite.  Denies chest pain, shortness of breath, nausea or vomiting. Post partum lochia is normal.  Ambulating ad gene without issue.  Breastfeeding ad gene and baby has good latch.              Significant Problems:      Past Medical History:   Diagnosis Date    Abnormal cervical Papanicolaou smear 2010    Herpes zoster without complication 07/2021    right hip, not treated    Infection due to 2019 novel coronavirus 11/2021    PCOS (polycystic ovarian syndrome) 2017    Premenstrual syndrome 2017             Review of Systems:    The patient denies any chest pain, shortness of breath, excessive pain, fever, chills, purulent drainage from the wound, nausea or vomiting.          Medications:   All medications related to the patient's surgery have been reviewed  Current Facility-Administered Medications   Medication    acetaminophen (TYLENOL) tablet 650 mg    benzocaine-menthol (DERMOPLAST) topical spray    bisacodyl (DULCOLAX) suppository 10 mg    carboprost (HEMABATE) injection 250 mcg    And    loperamide (IMODIUM) capsule 4 mg    docusate sodium (COLACE) capsule 100 mg    hydrocortisone (Perianal) (ANUSOL-HC) 2.5 % cream    ibuprofen (ADVIL/MOTRIN) tablet 800 mg    lanolin cream    lidocaine 1 % 0.1-20 mL    loperamide (IMODIUM) capsule 2 mg    methylergonovine (METHERGINE) injection 200 mcg    misoprostol (CYTOTEC) tablet 400 mcg    Or    misoprostol (CYTOTEC) suppository 800 mcg    misoprostol solution 200 mcg    nalbuphine (NUBAIN) injection 2.6-5 mg    No MMR Needed - Assessment: Patient does not need MMR vaccine    No Tdap Needed - Assessment: Patient does not need Tdap vaccine    oxytocin (PITOCIN) 30 units in 500 mL 0.9% NaCl infusion    oxytocin (PITOCIN) 30 units in 500 mL 0.9% NaCl infusion    oxytocin (PITOCIN)  "injection 10 Units    oxytocin (PITOCIN) injection 10 Units    tranexamic acid 1 g in 100 mL NS IV bag (premix)             Physical Exam:   All vitals stable  Blood pressure 126/60, pulse 75, temperature 97.6  F (36.4  C), temperature source Oral, resp. rate 18, height 1.727 m (5' 8\"), weight 85.7 kg (189 lb), last menstrual period 05/22/2023, SpO2 95%, unknown if currently breastfeeding.  Uterine fundus is firm, non-tender and at the level of the umbilicus  Cardiac: NSR, no murmurs rubs or gallops  Lungs: CTAB  Extremities: warm and well perfused, no edema.  Capillary refill <3 seconds.  No calf tenderness or swelling noted on my exam          Data:     Admission on 02/27/2024   Component Date Value Ref Range Status    Treponema Antibody Total 02/27/2024 Nonreactive  Nonreactive Final    WBC Count 02/27/2024 5.0  4.0 - 11.0 10e3/uL Final    RBC Count 02/27/2024 4.17  3.80 - 5.20 10e6/uL Final    Hemoglobin 02/27/2024 12.9  11.7 - 15.7 g/dL Final    Hematocrit 02/27/2024 37.6  35.0 - 47.0 % Final    MCV 02/27/2024 90  78 - 100 fL Final    MCH 02/27/2024 30.9  26.5 - 33.0 pg Final    MCHC 02/27/2024 34.3  31.5 - 36.5 g/dL Final    RDW 02/27/2024 13.7  10.0 - 15.0 % Final    Platelet Count 02/27/2024 165  150 - 450 10e3/uL Final    Hold Specimen 02/27/2024 [facility]   Final    GLUCOSE BY METER POCT 02/27/2024 86  70 - 99 mg/dL Final    GLUCOSE BY METER POCT 02/27/2024 89  70 - 99 mg/dL Final    GLUCOSE BY METER POCT 02/27/2024 79  70 - 99 mg/dL Final    GLUCOSE BY METER POCT 02/27/2024 70  70 - 99 mg/dL Final    GLUCOSE BY METER POCT 02/27/2024 121 (H)  70 - 99 mg/dL Final    GLUCOSE BY METER POCT 02/27/2024 106 (H)  70 - 99 mg/dL Final    GLUCOSE BY METER POCT 02/27/2024 68 (L)  70 - 99 mg/dL Final    GLUCOSE BY METER POCT 02/27/2024 67 (L)  70 - 99 mg/dL Final    GLUCOSE BY METER POCT 02/27/2024 115 (H)  70 - 99 mg/dL Final    Hemoglobin 02/28/2024 13.8  11.7 - 15.7 g/dL Final    Hold Specimen 02/28/2024 [facility]   Final "   ]         Assessment and Plan:    Assessment:   Post-partum day #1  Normal spontaneous vaginal delivery  L&D complications: None    Doing well.  No excessive bleeding  Pain well-controlled.  HGB normal  VSS and PE is reassuring      Plan:   Ambulation encouraged  Normal diet  Pain control measures as needed  Anticipate discharge in 1 day        Narda Winter MD  2/28/2024  8:35 PM

## 2024-02-28 NOTE — L&D DELIVERY NOTE
Delivery Summary      Viki Busch a 38 year old  now  female at 39w3d delivered a viable male full term AGA infant via  with APGAR scores of 8 and 9 at 1 and 5 minutes respectively.   Pregnancy was complicated by GDM A2 which was well controlled on insulin 26U daily.  GDM was indication for Viki's IOL.   Labor was induced with oral cytotec and augmented with Pitocin. Patient received intrathecal analgesia for pain relief and progressed to completion rapidly.  Patient pushed to deliver a viable male infant in OA position on 2024. Anterior and posterior shoulders delivered without difficulty. Cord clamped and cut and infant placed on mother's abdomen for immediate bonding and skin to skin; attended to by waiting nursing staff.  Apgars 8 and 9.  Placenta spontaneously delivered intact with 3VC. Inspection revealed no lacerations. Active third stage of labor with pitocin and fundal massage.  Uterus was noted to be firm but there was some persistent trickling of blood so rectal cytotec was given with resolution of bleeding. Mother and baby left DR in stable condition.  QBL: 270 cc  Blood type:A positive    Cord complications: none  Placenta delivered promptly at about 5:45 PM.  Placenta disposition was hospital disposal.  Fundal massage performed and uterine fundus found to be firm to palpation.      Episiotomy: n/A  Perineum, vagina and cervix inspected and no lacerations were noted.    Perineal lacerations: none    GA:39w3d EDC of 3/2/2024  GP:   QBL: 247 cc  Delivery type: vaginal, spontaneous  ROM to delivery: 1 minute   weight: 7 lb 6 oz     Narda Winter MD           Viki Busch MRN# [id number]   Age: 38 year old YOB: 1985     Labor Event Times:    Labor Onset Date 2024       Labor Onset Time 4:40 PM    Dilation Complete Date 2024    Dilation Complete Time 4:40 PM       Start Pushing Date        Start Pushing Time            Labor Length:     1st Stage (hrs/min)       2nd Stage (hrs/min)       3rd Stage (hrs/min)           Labor Events:     Labor No    Rupture Date     Rupture Time     Rupture Type     Fluid Color     Labor Type Induction/Cervical ripening  Augmentation    Induction     Induction Indication         Augmentation     Labor Complications     Additional Complications     Management of Labor        Antibiotics     IV Antibiotic Given     Additional Management     Fetal Status Prior to  Delivery     Fetal Status Comments         Cervical Ripening:    Date 2024     Time 8:00 AM     Type          Delivery:    Episiotomy     Local Anesthetic        Lacerations     Sponge Count Correct       Needle Count Correct     Final Count by:     Sutures     Blood loss (ml)     Packing Intentionally Left In     Number     Comments           Information for the patient's :  Randall Busch [[id number]]     Delivery  2024 5:35 PM by  Vaginal, Spontaneous  Sex:  male Gestational Age: 39w3d  Delivery Clinician:     Living?:            APGARS  One minute Five minutes Ten minutes   Skin color:            Heart rate:            Grimace:            Muscle tone:            Breathing:            Totals: 8  9         Presentation/position:           Resuscitation and Interventions: Method:  None  Oxygen Type:     Intubation Time:   # of Attempts:     ETT Size:        Tracheal Suction:     Tracheal returns:      [place] Care at Delivery:  Viable baby boy born via  per Dr. Winter, RT present, baby placed on mother's abdomen, dried and stimulated with warm blankets, delayed cord clamping per mother's request, lusty cry, heart rate always greater than 100, slight bluish color, pinking up quickly, bands applied to mom, dad, and baby, APGARs 8&9.         Cord information:     Disposition of cord blood:      Blood gases sent?    Complications:     Placenta: Delivered:           appearance.  Comments:  .  Disposition:    [place]  "Measurements:  Weight: 7 lb 6.7 oz (3365 g)  Height: 20.5\"  Head circumference: 33 cm  Chest circumference:     Temperature:     Other providers:       Additional  information:  Forceps:    Verbal Informed Consent Obtained:       Alternative Labor Strategies Discussed:     Emergency Resources Available:       Type:       Accrued Pulling Time:       # of Pulls:      Position:     Fetal Station:       Indications:      Other Indications:     Operative Vaginal Delivery Brief Note Forceps:        Vacuum:    Verbal Informed Consent Obtained:     Alternative Labor Strategies Discussed:     Emergency Resources Available:     Type:      Accrued Pulling Time:       # of Pop-Offs:       # of Pulls:       Position:     Fetal Station:      Indications for Vacuum:       Other Indications:    Operative Vaginal Delivery Brief Note Vacuum:        Shoulder Dystocia Shoulder Dystocia    No data filed          Breech:       : Type:     Indications for Primary:     Indications for Secondary:     Other Indications:        Observed anomalies     Output in Delivery Room:                                   "

## 2024-02-28 NOTE — PLAN OF CARE
Data: Vital signs within normal limits. Postpartum checks within normal limits - see flow record. Patient eating and drinking normally. Patient able to empty bladder independently and is up ambulating. No apparent signs of infection. Patient performing self cares and is able to care for infant.  Action: Patient denied pain. Patient education done about  bath and skin care,  diagnostic tests, infection prevention, and pain management. See flow record.  Response: Positive attachment behaviors observed with infant. Support persons present.   Plan: Anticipate discharge tomorrow.

## 2024-02-28 NOTE — LACTATION NOTE
INPATIENT LACTATION CONSULT    Viki Busch                                                                             [id number]    Consultation Date: 2024    Reason for Lactation Referral:routine lactation assessment    Baby's :24    Baby's Current Age:1d  Baby's Gestational Age:39w 3d    Provider: Dr. Rzea      Maternal History  Maternal History:GDM on insulin, PCOS  Breast Surgery:No  Breast Changes During Pregnancy:No  Breast Feeding History:Yes,  successful,   Delivery: with no complications  Maternal Medications:Vistaril, PNV, Vit C    Maternal Assessment  Breast Size: average  Nipple Appearance - Left: intact  Nipple Appearance - Right: intact  Nipple Erectility - Left: erect with stimulation  Nipple Erectility - Right: erect with stimulation  Areolas Compressibility: soft  Nipple Size: average  Milk Supply: colostrum    Infant Assessment  Birth Weight: 7 lb 6.7 oz  Mouth: normal  Palate: normal  Jaw: normal  Tongue: normal  Frenulum: normal  Digital Suck Exam: not assessed    Feeding  Feeding Time:1330   Effort to Latch:awake and alert, latched easily  gentle stimulation needed for infant to latch  Position: R Breast cross craddle  Devices:none    LATCH Score:  Latch:2 - Good Latch  Audible Swallowin - Spontaneous & frequent  Type of Nipple:2 - Everted  Comfort:2 - Soft, Nontender  Hold:2 - No Assist  Total LATCH Score:10/10    Education  Following education covered with mom. States understanding and denies any questions or concerns.    exclusivity explained and encouraged to establish breastfeeding and order in milk   rooming-in encouraged with explanation of benefits  encourage skin to skin with explanation of benefits  expressed breast milk and lanolin to nipples for healing and comfort as needed  feeding positions  obtaining a deep latch  how to properly unlatching babe  hand expression  handling and storage of expressed milk  frequency  "of feedings (8-12 times in 24 hr period)  how to tell babe is getting enough  feeing cues  burping techniques  anticipatory guidance for \"baby's second night\"    Feeding Plan  Continue to feed on demand when  elicits feeding cues with deep latch. Strive for 8-12 feeding sessions in a 24hr period. Will attempt to do as many feeding sessions skin to skin as possible. Follow-up support provided and OP lactation appt scheduled.      Nicole Ramirez RN, RNC-OB, IBCLC  Lactation Consultant  Corie Callahan  "

## 2024-02-28 NOTE — PLAN OF CARE
Face to face report given with opportunity to observe patient.  Report given to Ursula Ozuna RN. & angie Goddard RN  2/28/2024, 7:25 AM    Goal Outcome Evaluation:

## 2024-02-28 NOTE — PLAN OF CARE
Data: Vital signs within normal limits. Postpartum checks within normal limits - see flow record. Patient eating and drinking normally. Patient able to empty bladder independently and is up ambulating. No apparent signs of infection. Patient performing self cares and is able to care for infant.  Action: Patient denied pain. Patient education done about  care, breastfeeding, frequent voiding, and  diagnostic tests. See flow record.  Response: Positive attachment behaviors observed with infant. Support persons present.   Plan: Anticipate discharge later today or tomorrow morning.

## 2024-02-28 NOTE — PLAN OF CARE
Assessments completed as charted. B/P: 128/65, T: 98, P: 75, R: 16. Rates pain: 0/10 . Voiding without difficulty. Fundus: Midline . Lochia: Light. Activity: unrestricted with out pain  and normal activity. Infant feeding: Breast feeding going well.           Postpartum breastfeeding assessment completed and education provided, see Patient Education Activity.  Items included in the education are:   proper positioning and latch  effectiveness of feeding  manual expression  handling and storing breastmilk  maintenance of breastfeeding for the first 6 months  sign/symptoms of infant feeding issues requiring referral to qualified health care provider  Postpartum care education provided, see Patient Education activity. Patient denies needs. Will monitor.  Carmen Goddard RN  Goal Outcome Evaluation:           PT up independently in room handling infant cares independently

## 2024-02-28 NOTE — PLAN OF CARE
Face to face report given with opportunity to observe patient.    Report given to Dinah Mcintosh RN   2/27/2024  11:16 PM

## 2024-02-28 NOTE — PLAN OF CARE
Vaginal Delivery Note     of viable Male.  Nursery RN María present.  Infant with spontaneous cry, to mother's abdomen, dried and stimulated. Kenzie cares provided.  Mother and baby in stable condition. Baby skin-to-skin with mom. ID bands placed on infant, mom and dad.

## 2024-02-28 NOTE — PROGRESS NOTES
Medical record reviewed.  Met with care team. No apparent needs noted at this time. Care Transitions will remain available if needs arise.  CHARLENE Cox @[phone] February 28, 2024

## 2024-02-29 VITALS
BODY MASS INDEX: 27.24 KG/M2 | HEIGHT: 68 IN | WEIGHT: 179.76 LBS | SYSTOLIC BLOOD PRESSURE: 124 MMHG | DIASTOLIC BLOOD PRESSURE: 61 MMHG | OXYGEN SATURATION: 97 % | HEART RATE: 60 BPM | RESPIRATION RATE: 16 BRPM | TEMPERATURE: 97.8 F

## 2024-02-29 PROBLEM — Z34.90 PREGNANCY: Status: RESOLVED | Noted: 2024-02-26 | Resolved: 2024-02-29

## 2024-02-29 PROCEDURE — 250N000013 HC RX MED GY IP 250 OP 250 PS 637: Performed by: STUDENT IN AN ORGANIZED HEALTH CARE EDUCATION/TRAINING PROGRAM

## 2024-02-29 RX ADMIN — DOCUSATE SODIUM 100 MG: 100 CAPSULE, LIQUID FILLED ORAL at 09:31

## 2024-02-29 ASSESSMENT — ACTIVITIES OF DAILY LIVING (ADL)
ADLS_ACUITY_SCORE: 18

## 2024-02-29 NOTE — PLAN OF CARE
Assessments completed as charted. B/P: 119/61, T: 97.7, P: 75, R: 16. Rates pain: 0/10 . Voiding without difficulty. Fundus: Midline U / -1. Lochia: Light. Passed one large blood clot overnight. Activity: unrestricted with out pain . Infant feeding: Breast feeding going well.           Postpartum breastfeeding assessment completed and education provided, see Patient Education Activity.  Items included in the education are:   proper positioning and latch  effectiveness of feeding  manual expression  handling and storing breastmilk  maintenance of breastfeeding for the first 6 months  sign/symptoms of infant feeding issues requiring referral to qualified health care provider  Postpartum care education provided, see Patient Education activity. Patient denies needs. Will monitor.  Ludy Burgos RN

## 2024-02-29 NOTE — PLAN OF CARE
Face to face report given with opportunity to observe patient.    Report given to CARMEN Byrd RN   2/28/2024  7:02 PM

## 2024-02-29 NOTE — PLAN OF CARE
Goal Outcome Evaluation:      Plan of Care Reviewed With: patient, spouse    Overall Patient Progress: improvingOverall Patient Progress: improving     Pt VSS.  No c/o pain today.  Assessment is WDL.  She is attentive and supportive to the needs of her baby and is breastfeeding without difficulty.  She makes needs known.  Call light in reach.      Patient discharged at 2:00 PM via ambulation accompanied by spouse and staff and staff. Prescriptions - None ordered for discharge. All belongings sent with patient.     Discharge instructions reviewed with pt and spouse. Listed belongings gathered and returned to patient. yes    Patient discharged to home.   Report called to na    Surgical Patient   Surgical Procedures during stay: no  Did patient receive discharge instruction on wound care and recognition of infection symptoms? N/A    MISC  Follow up appointment made:  Yes  Home medications returned to patient: N/A  Patient reports pain was well managed at discharge: Yes

## 2024-02-29 NOTE — DISCHARGE INSTRUCTIONS
Warning Signs after Having a Baby    Keep this paper on your fridge or somewhere else where you can see it.    Call your provider if you have any of these symptoms up to 12 weeks after having your baby.    Thoughts of hurting yourself or your baby  Pain in your chest or trouble breathing  Severe headache not helped by pain medicine  Eyesight concerns (blurry vision, seeing spots or flashes of light, other changes to eyesight)  Fainting, shaking or other signs of a seizure    Call 9-1-1 if you feel that it is an emergency.     The symptoms below can happen to anyone after giving birth. They can be very serious. Call your provider if you have any of these warning signs.    My provider s phone number: _______________________    Losing too much blood (hemorrhage)    Call your provider if you soak through a pad in less than an hour or pass blood clots bigger than a golf ball. These may be signs that you are bleeding too much.    Blood clots in the legs or lungs    After you give birth, your body naturally clots its blood to help prevent blood loss. Sometimes this increased clotting can happen in other areas of the body, like the legs or lungs. This can block your blood flow and be very dangerous.     Call your provider if you:  Have a red, swollen spot on the back of your leg that is warm or painful when you touch it.   Are coughing up blood.     Infection    Call your provider if you have any of these symptoms:  Fever of 100.4 F (38 C) or higher.  Pain or redness around your stitches if you had an incision.   Any yellow, white, or green fluid coming from places where you had stitches or surgery.    Mood Problems (postpartum depression)    Many people feel sad or have mood changes after having a baby. But for some people, these mood swings are worse.     Call your provider right away if you feel so anxious or nervous that you can't care for yourself or your baby.    Preeclampsia (high blood pressure)    Even if you  didn't have high blood pressure when you were pregnant, you are at risk for the high blood pressure disease called preeclampsia. This risk can last up to 12 weeks after giving birth.     Call your provider if you have:   Pain on your right side under your rib cage  Sudden swelling in the hands and face    Remember: You know your body. If something doesn't feel right, get medical help.     For informational purposes only. Not to replace the advice of your health care provider. Copyright 2020 [facility]. All rights reserved. Clinically reviewed by Mine Wetzel, RNC-OB, MSN. CONSTRVCT [id number] - Rev 02/23.

## 2024-03-15 ENCOUNTER — MEDICAL CORRESPONDENCE (OUTPATIENT)
Dept: HEALTH INFORMATION MANAGEMENT | Facility: HOSPITAL | Age: 39
End: 2024-03-15

## 2024-04-11 ENCOUNTER — MYC REFILL (OUTPATIENT)
Dept: FAMILY MEDICINE | Facility: OTHER | Age: 39
End: 2024-04-11

## 2024-04-11 ENCOUNTER — OFFICE VISIT (OUTPATIENT)
Dept: FAMILY MEDICINE | Facility: OTHER | Age: 39
End: 2024-04-11
Attending: FAMILY MEDICINE
Payer: COMMERCIAL

## 2024-04-11 VITALS
DIASTOLIC BLOOD PRESSURE: 80 MMHG | HEART RATE: 80 BPM | RESPIRATION RATE: 18 BRPM | OXYGEN SATURATION: 98 % | BODY MASS INDEX: 26.61 KG/M2 | WEIGHT: 175.6 LBS | SYSTOLIC BLOOD PRESSURE: 125 MMHG | HEIGHT: 68 IN | TEMPERATURE: 98 F

## 2024-04-11 DIAGNOSIS — E55.9 HYPOVITAMINOSIS D: ICD-10-CM

## 2024-04-11 DIAGNOSIS — Z12.31 OTHER SCREENING MAMMOGRAM: ICD-10-CM

## 2024-04-11 DIAGNOSIS — Z12.4 CERVICAL CANCER SCREENING: ICD-10-CM

## 2024-04-11 DIAGNOSIS — E28.2 PCOS (POLYCYSTIC OVARIAN SYNDROME): ICD-10-CM

## 2024-04-11 PROCEDURE — G0123 SCREEN CERV/VAG THIN LAYER: HCPCS | Performed by: FAMILY MEDICINE

## 2024-04-11 PROCEDURE — 99214 OFFICE O/P EST MOD 30 MIN: CPT | Performed by: FAMILY MEDICINE

## 2024-04-11 PROCEDURE — 87624 HPV HI-RISK TYP POOLED RSLT: CPT | Performed by: FAMILY MEDICINE

## 2024-04-11 RX ORDER — PRENATAL VIT/IRON FUM/FOLIC AC 27MG-0.8MG
1 TABLET ORAL DAILY
Qty: 90 TABLET | Refills: 3 | Status: SHIPPED | OUTPATIENT
Start: 2024-04-11

## 2024-04-11 RX ORDER — PRENATAL VIT/IRON FUM/FOLIC AC 27MG-0.8MG
1 TABLET ORAL DAILY
Qty: 90 TABLET | Refills: 3 | OUTPATIENT
Start: 2024-04-11

## 2024-04-11 ASSESSMENT — PAIN SCALES - GENERAL: PAINLEVEL: NO PAIN (0)

## 2024-04-11 NOTE — PROGRESS NOTES
"BMI  Estimated body mass index is 26.7 kg/m  as calculated from the following:    Height as of this encounter: 1.727 m (5' 8\").    Weight as of this encounter: 79.7 kg (175 lb 9.6 oz).   Weight management plan: Discussed healthy diet and exercise guidelines    Patient was agreeable to this plan and had no further questions.  There are no Patient Instructions on file for this visit.    No follow-ups on file.    Leatha Drew is a 38 year old, presenting for the following health issues:  Post Partum Exam        2024     2:54 PM   Additional Questions   Roomed by Lena Alicea LPN, CCP, MHP   Accompanied by self     History of Present Illness       Reason for visit:  Follow up    She eats 0-1 servings of fruits and vegetables daily.She consumes 1 sweetened beverage(s) daily.She exercises with enough effort to increase her heart rate 9 or less minutes per day.  She exercises with enough effort to increase her heart rate 3 or less days per week.   She is taking medications regularly.     Post Partum Visit -                   Delivery date: 2024        Patient had a  and induced vaginal delivery of viable boy, weight 7 lbs 6 ozs,           with Excessive bleeding complications.        Accompanying Signs & Symptoms:                        Breast feeding: Yes   Abdominal pain: No                       Bleeding since delivery: YES- 4.5 weeks of bleeding.         Contraception choice: natural family planning        Patient has not had intercourse since delivery              and complains of No discomfort.        Last PAP:   Lab Results   Component Value Date    PAP NIL 2017           Pt screened for postpartum depression and complaints are: NEGATIVE      Female History and Physical for Post Partum Exam     Viki Busch MRN# [id number]   YOB: 1985 Age: 38 year old     Primary care provider: Ludy Reza                 Chief Complaint:     Doing well  History is obtained " from the patient         History of Present Illness:   This patient is a 38 year old female who presents for post partum visit             Past Medical History:     Past Medical History:   Diagnosis Date    Abnormal cervical Papanicolaou smear 2010    Herpes zoster without complication 07/2021    right hip, not treated    Infection due to 2019 novel coronavirus 11/2021    PCOS (polycystic ovarian syndrome) 2017    Premenstrual syndrome 2017             Past Surgical History:     Past Surgical History:   Procedure Laterality Date    HC TOOTH EXTRACTION W/FORCEP Bilateral 06/01/2016              Gynecologic History:   Patient's last menstrual period was 05/22/2023 (approximate).          Obstetrical History:   See Epic         Social History:     Social History     Tobacco Use    Smoking status: Former     Current packs/day: 0.20     Average packs/day: 0.2 packs/day for 6.0 years (1.2 ttl pk-yrs)     Types: Cigarettes     Passive exposure: Never    Smokeless tobacco: Never    Tobacco comments:     quit 2010   Substance Use Topics    Alcohol use: Not Currently     Comment: 2-3x/month             Family History:     Family History   Problem Relation Age of Onset    Thyroid Disease Mother     Hyperlipidemia Father     Family History Negative Sister     Family History Negative Brother     Family History Negative Brother     Other - See Comments Maternal Grandmother         depression    Other - See Comments Maternal Grandfather         old age    Other - See Comments Paternal Grandmother         old age    Other - See Comments Paternal Grandfather         old age             Immunizations:     Immunization History   Administered Date(s) Administered    Influenza Vaccine >6 months,quad, PF 02/14/2018    TDAP (Adacel,Boostrix) 03/16/2022    TDAP Vaccine (Adacel) 04/07/2017, 12/20/2017            Allergies:   No Known Allergies          Medications:     Current Outpatient Medications:     Prenatal Vit-Fe Fumarate-FA  "(PRENATAL MULTIVITAMIN W/IRON) 27-0.8 MG tablet, Take 1 tablet by mouth daily, Disp: 90 tablet, Rfl: 3            Review of Systems:     A comprehensive, 10 point review of systems was performed and found to be negative              Physical Exam:   Vitals were reviewed  Blood pressure 125/80, pulse 80, temperature 98  F (36.7  C), temperature source Tympanic, resp. rate 18, height 1.727 m (5' 8\"), weight 79.7 kg (175 lb 9.6 oz), last menstrual period 05/22/2023, SpO2 98%, currently breastfeeding.  Constitutional:   awake, alert, cooperative, no apparent distress, and appears stated age   Eyes:   Lids and lashes normal, pupils equal, round and reactive to light, extra ocular muscles intact, sclera clear, conjunctiva normal   ENT:   Normocephalic, without obvious abnormality, atramatic, external ears without lesions, oral pharynx with moist mucus membranes, tonsils without erythema or exudates, gums normal and good dentition.   Neck:   Supple, symmetrical, trachea midline, no adenopathy, thyroid symmetric, not enlarged and no tenderness, skin normal   Hematologic / Lymphatic:   no cervical lymphadenopathy   Lungs:   No increased work of breathing, good air exchange, clear to auscultation bilaterally, no crackles or wheezing   Cardiovascular:   Normal apical impulse, regular rate and rhythm, normal S1 and S2, no S3 or S4, and no murmur noted   Abdomen:   No scars, normal bowel sounds, soft, non-distended, non-tender, no masses palpated, no hepatosplenomegally   Chest / Breast:   Breasts symmetrical, skin without lesion(s), no nipple retraction or dimpling, no nipple discharge, no masses palpated, no axillary or supraclavicular adenopathy   Genitounirinary:   External Genitalia:  General appearance; normal  Urethra:  Fullness absent, Masses absent  Bladder:  Fullness absent, Masses absent  Vagina:  General appearance normal  Cervix:  General appearance normal, anteverted, pap smear done  Uterus:  Size normal, Contour " normal, Position normal  Adenexa:  Masses absent, Tenderness absent, Enlargement absent   Musculoskeletal:   There is no redness, warmth, or swelling of the joints.  Full range of motion noted.  Motor strength is 5 out of 5 all extremities bilaterally.  Tone is normal.   Neurologic:   Awake, alert, oriented to name, place and time.  Cranial nerves II-XII are grossly intact.  Motor is 5 out of 5 bilaterally.  Sensory is intact.  and gait is normal.   Neuropsychiatric:   General: normal, calm and normal eye contact  Level of consciousness: alert / normal  Affect: normal and pleasant  Orientation: oriented to self, place, time and situation  Memory and insight: normal, memory for past and recent events intact and thought process normal   Skin:   no bruising or bleeding, Body Locations:   scattered tattoo          Data:     No visits with results within 1 Day(s) from this visit.   Latest known visit with results is:   Admission on 02/27/2024, Discharged on 02/29/2024   Component Date Value Ref Range Status    Treponema Antibody Total 02/27/2024 Nonreactive  Nonreactive Final    WBC Count 02/27/2024 5.0  4.0 - 11.0 10e3/uL Final    RBC Count 02/27/2024 4.17  3.80 - 5.20 10e6/uL Final    Hemoglobin 02/27/2024 12.9  11.7 - 15.7 g/dL Final    Hematocrit 02/27/2024 37.6  35.0 - 47.0 % Final    MCV 02/27/2024 90  78 - 100 fL Final    MCH 02/27/2024 30.9  26.5 - 33.0 pg Final    MCHC 02/27/2024 34.3  31.5 - 36.5 g/dL Final    RDW 02/27/2024 13.7  10.0 - 15.0 % Final    Platelet Count 02/27/2024 165  150 - 450 10e3/uL Final    Hold Specimen 02/27/2024 [facility]   Final    GLUCOSE BY METER POCT 02/27/2024 86  70 - 99 mg/dL Final    GLUCOSE BY METER POCT 02/27/2024 89  70 - 99 mg/dL Final    GLUCOSE BY METER POCT 02/27/2024 79  70 - 99 mg/dL Final    GLUCOSE BY METER POCT 02/27/2024 70  70 - 99 mg/dL Final    GLUCOSE BY METER POCT 02/27/2024 121 (H)  70 - 99 mg/dL Final    GLUCOSE BY METER POCT 02/27/2024 106 (H)  70 - 99 mg/dL Final     GLUCOSE BY METER POCT 02/27/2024 68 (L)  70 - 99 mg/dL Final    GLUCOSE BY METER POCT 02/27/2024 67 (L)  70 - 99 mg/dL Final    GLUCOSE BY METER POCT 02/27/2024 115 (H)  70 - 99 mg/dL Final    Hemoglobin 02/28/2024 13.8  11.7 - 15.7 g/dL Final    Hold Specimen 02/28/2024 [facility]   Final   ]         Assessment and Plan:     (Z39.2) Routine postpartum follow-up  (primary encounter diagnosis)  Comment:   Plan: follow-up 1 year    (E28.2) PCOS (polycystic ovarian syndrome)  Comment:   Plan: restart medications    (E55.9) Hypovitaminosis D  Comment:   Plan: continue supplementation while breastfeeding    (Z12.4) Cervical cancer screening  Comment:   Plan: A pap thin layer screen with  HPV - recommended        age 30 - 65 years, Prenatal Vit-Fe Fumarate-FA         (PRENATAL MULTIVITAMIN W/IRON) 27-0.8 MG tablet        due    (Z12.31) Other screening mammogram  Comment:   Plan: [facility] SCREENING DIGITAL BILATERAL ([place])        Will be due this year       Patient was agreeable to this plan and had no further questions.        Ludy Reza MD  4/11/2024  6:58 PM

## 2024-04-18 LAB
BKR LAB AP GYN ADEQUACY: NORMAL
BKR LAB AP GYN INTERPRETATION: NORMAL
BKR LAB AP HPV REFLEX: NORMAL
BKR LAB AP PREVIOUS ABNORMAL: NORMAL
PATH REPORT.COMMENTS IMP SPEC: NORMAL
PATH REPORT.COMMENTS IMP SPEC: NORMAL
PATH REPORT.RELEVANT HX SPEC: NORMAL

## 2024-04-19 LAB
HUMAN PAPILLOMA VIRUS 16 DNA: NEGATIVE
HUMAN PAPILLOMA VIRUS 18 DNA: NEGATIVE
HUMAN PAPILLOMA VIRUS FINAL DIAGNOSIS: NORMAL
HUMAN PAPILLOMA VIRUS OTHER HR: NEGATIVE

## 2024-04-25 NOTE — DISCHARGE SUMMARY
"Discharge Summary    Viki Busch MRN# [id number]   YOB: 1985 Age: 38 year old     Date of Admission:  2024  Date of Discharge:  2024  Admitting Physician:  Ludy Reza MD  Discharge Physician:  No att. providers found  Discharging Service:  [facility]     Home clinic: [facility]  Primary Provider: Ludy Reza          Admission Diagnoses:   Pregnancy [Z34.90]          Discharge Diagnosis:     Patient Active Problem List   Diagnosis    PCOS (polycystic ovarian syndrome)    Premenstrual syndrome    Hypovitaminosis D    Low serum progesterone    Galactorrhea    Procreative counseling and advice using natural family planning    Ectropion of cervix    AMA (advanced maternal age) multigravida 35+, unspecified trimester    Supervision of other high risk pregnancy, antepartum    ACP (advance care planning)    Excessive fetal growth affecting management of pregnancy in third trimester, single or unspecified fetus     (normal spontaneous vaginal delivery)    Insulin controlled gestational diabetes mellitus (GDM) in third trimester    Cervical cancer screening    Other screening mammogram                Discharge Disposition:     Discharged to home           Condition on Discharge:     Discharge condition: Stable   Discharge vitals: Blood pressure 124/61, pulse 60, temperature 97.8  F (36.6  C), temperature source Oral, resp. rate 16, height 1.727 m (5' 8\"), weight 81.5 kg (179 lb 12.2 oz), last menstrual period 2023, SpO2 97%, unknown if currently breastfeeding.   Code status on discharge: Full Code           Procedures / Labs / Imaging:   No procedures performed during this admission          Medications Prior to Admission:     No medications prior to admission.             Discharge Medications:     No current facility-administered medications for this encounter.     Current Outpatient Medications   Medication Sig Dispense Refill    Prenatal Vit-Fe " "Fumarate-FA (PRENATAL MULTIVITAMIN W/IRON) 27-0.8 MG tablet Take 1 tablet by mouth daily 90 tablet 3             Consultations:     No consultations were requested during this admission             Brief History of Illness:   Viki Busch is a 38 year old female who was admitted for labor          Hospital Course:       Pregnancy    * No active hospital problems. *              Final Day of Progress before Discharge:       Assessment and Plan:  PPD #1, doing well.  Anticipate discharge today and follow-up with PCP, in 6 weeks                Physical Exam:  Vitals were reviewed  Blood pressure 124/61, pulse 60, temperature 97.8  F (36.6  C), temperature source Oral, resp. rate 16, height 1.727 m (5' 8\"), weight 81.5 kg (179 lb 12.2 oz), last menstrual period 05/22/2023, SpO2 97%, unknown if currently breastfeeding.  Constitutional:   awake, alert, cooperative, no apparent distress, and appears stated age     Lungs:   No increased work of breathing, good air exchange, clear to auscultation bilaterally, no crackles or wheezing     Cardiovascular:   Normal apical impulse, regular rate and rhythm, normal S1 and S2, no S3 or S4, and no murmur noted     Neuropsychiatric:   General: normal, calm, and normal eye contact  Affect: normal     Skin:   no bruising or bleeding     Additional findings:   Abdomen:  soft, non-tender, non-distended, BS+, fundus well below umbilicus          Data:  All laboratory data reviewed         Significant Results:     None             Pending Results:     None           Discharge Instructions and Follow-Up:     Discharge diet: Regular   Discharge activity: Activity as tolerated   Discharge follow-up: Follow up with primary care provider in 6 weeks   Outpatient therapy: None    Home Care agency: None    Supplies and equipment: None   Lines and drains: None    Wound care: None   Other instructions: Take Vitamin D 50,000 international unit(s) weekly with a meal that has fat in it for 3 months " while breastfeeding     Narda Winter MD

## 2024-04-26 NOTE — TELEPHONE ENCOUNTER
"COVID 19 Nurse Triage Plan/Patient Instructions    Please be aware that novel coronavirus (COVID-19) may be circulating in the community. If you develop symptoms such as fever, cough, or SOB or if you have concerns about the presence of another infection including coronavirus (COVID-19), please contact your health care provider or visit [email or web address].     Disposition/Instructions    Additional COVID19 information to add for patients.   How can I protect others?  If you have symptoms (fever, cough, body aches or trouble breathing): Stay home and away from others (self-isolate) until:    At least 10 days have passed since your symptoms started, And     You ve had no fever--and no medicine that reduces fever--for 1 full day (24 hours), And      Your other symptoms have resolved (gotten better).     If you don t have symptoms, but a test showed that you have COVID-19 (you tested positive):    Stay home and away from others (self-isolate). Follow the tips under \"How do I self-isolate?\" below for 10 days (20 days if you have a weak immune system).    You don't need to be retested for COVID-19 before going back to school or work. As long as you're fever-free and feeling better, you can go back to school, work and other activities after waiting the 10 or 20 days.     How do I self-isolate?    Stay in your own room, even for meals. Use your own bathroom if you can.     Stay away from others in your home. No hugging, kissing or shaking hands. No visitors.    Don t go to work, school or anywhere else.     Clean  high touch  surfaces often (doorknobs, counters, handles, etc.). Use a household cleaning spray or wipes. You ll find a full list on the EPA website:  www.epa.gov/pesticide-registration/list-n-disinfectants-use-against-sars-cov-2.    Cover your mouth and nose with a mask, tissue or washcloth to avoid spreading germs.    Wash your hands and face often. Use soap and water.    Caregivers in these groups are " at risk for severe illness due to COVID-19:  o People 65 years and older  o People who live in a nursing home or long-term care facility  o People with chronic disease (lung, heart, cancer, diabetes, kidney, liver, immunologic)  o People who have a weakened immune system, including those who:  - Are in cancer treatment  - Take medicine that weakens the immune system, such as corticosteroids  - Had a bone marrow or organ transplant  - Have an immune deficiency  - Have poorly controlled HIV or AIDS  - Are obese (body mass index of 40 or higher)  - Smoke regularly    Caregivers should wear gloves while washing dishes, handling laundry and cleaning bedrooms and bathrooms.    Use caution when washing and drying laundry: Don t shake dirty laundry, and use the warmest water setting that you can.    For more tips, go to www.cdc.gov/coronavirus/2019-ncov/downloads/10Things.pdf.    How can I take care of myself?  1. Get lots of rest. Drink extra fluids (unless a doctor has told you not to).     2. Take Tylenol (acetaminophen) for fever or pain. If you have liver or kidney problems, ask your family doctor if it s okay to take Tylenol.     Adults can take either:     650 mg (two 325 mg pills) every 4 to 6 hours, or     1,000 mg (two 500 mg pills) every 8 hours as needed.     Note: Don t take more than 3,000 mg in one day.   Acetaminophen is found in many medicines (both prescribed and over-the-counter medicines). Read all labels to be sure you don t take too much.     For children, check the Tylenol bottle for the right dose. The dose is based on the child s age or weight.    3. If you have other health problems (like cancer, heart failure, an organ transplant or severe kidney disease): Call your specialty clinic if you don t feel better in the next 2 days.    4. Know when to call 911: Emergency warning signs include:    Trouble breathing or shortness of breath    Pain or pressure in the chest that doesn t go away    Feeling  confused like you haven t felt before, or not being able to wake up    Bluish-colored lips or face    What are the symptoms of COVID-19?     The most common symptoms are cough, fever and trouble breathing.     Less common symptoms include body aches, chills, diarrhea (loose, watery poops), fatigue (feeling very tired), headache, runny nose, sore throat and loss of smell.    COVID-19 can cause severe coughing (bronchitis) and lung infection (pneumonia).    How does it spread?     The virus may spread when a person coughs or sneezes into the air. The virus can travel about 6 feet this way, and it can live on surfaces.      Common  (household disinfectants) will kill the virus.    Who is at risk?  Anyone can catch COVID-19 if they re around someone who has the virus.    How can others protect themselves?     Stay away from people who have COVID-19 (or symptoms of COVID-19).    Wash hands often with soap and water. Or, use hand  with at least 60% alcohol.    Avoid touching the eyes, nose or mouth.     Wear a face mask when you go out in public, when sick or when caring for a sick person.    Where can I get more information?     [employer]: About COVID-19: [email or web address]    CDC: What to Do If You re Sick: www.cdc.gov/coronavirus/2019-ncov/about/steps-when-sick.html    CDC: Ending Home Isolation: www.cdc.gov/coronavirus/2019-ncov/hcp/disposition-in-home-patients.html     CDC: Caring for Someone: www.cdc.gov/coronavirus/2019-ncov/if-you-are-sick/care-for-someone.html     [facility]: Interim Guidance for Hospital Discharge to Home: www.health.[facility].[place]/diseases/coronavirus/hcp/hospdischarge.pdf    [facility] clinical trials (COVID-19 research studies): clinicalaffairs.[facility]/umn-clinical-trials     Below are the COVID-19 hotlines at the [place] Department of Health ([facility]). Interpreters are available.   o For health questions: Call 956-127-8502 or 1-841.456.5469 (7 a.m. to 7  "p.m.)  o For questions about schools and childcare: Call 607-494-4045 or 1-719.499.1921 (7 a.m. to 7 p.m.)          Thank you for taking steps to prevent the spread of this virus.  o Limit your contact with others.  o Wear a simple mask to cover your cough.  o Wash your hands well and often.    Resources    [facility]: About COVID-19: [email or web address].[facility].[email or web address]/covid19/    CDC: What to Do If You're Sick: www.cdc.gov/coronavirus/2019-ncov/about/steps-when-sick.html    CDC: Ending Home Isolation: www.cdc.gov/coronavirus/2019-ncov/hcp/disposition-in-home-patients.html     CDC: Caring for Someone: www.cdc.gov/coronavirus/2019-ncov/if-you-are-sick/care-for-someone.html     [facility]: Interim Guidance for Hospital Discharge to Home: www.[facility].mn./diseases/coronavirus/hcp/hospdischarge.pdf    [facility] clinical trials (COVID-19 research studies): clinicalaffairs.[facility]-clinical-trials     Below are the COVID-19 hotlines at the [place] Department of Health ([facility]). Interpreters are available.   o For health questions: Call 020-489-1299 or 1-162.420.2549 (7 a.m. to 7 p.m.)  o For questions about schools and childcare: Call 734-302-1958 or 1-800.981.2885 (7 a.m. to 7 p.m.)                       Answer Assessment - Initial Assessment Questions  1. COVID-19 EXPOSURE: \"Please describe how you were exposed to someone with a COVID-19 infection.\"      family  2. PLACE of CONTACT: \"Where were you when you were exposed to COVID-19?\" (e.g., home, school, medical waiting room; which city?)      home  3. TYPE of CONTACT: \"How much contact was there?\" (e.g., sitting next to, live in same house, work in same office, same building)      Lives with  4. DURATION of CONTACT: \"How long were you in contact with the COVID-19 patient?\" (e.g., a few seconds, passed by person, a few minutes, 15 minutes or longer, live with the patient)      hours  5. MASK: \"Were you wearing a mask?\" \"Was the other person wearing a mask?\" " "Note: wearing a mask reduces the risk of an otherwise close contact.      no  6. DATE of CONTACT: \"When did you have contact with a COVID-19 patient?\" (e.g., how many days ago)      today  7. COMMUNITY SPREAD: \"Are there lots of cases of COVID-19 (community spread) where you live?\" (See public health department website, if unsure)        yes  8. SYMPTOMS: \"Do you have any symptoms?\" (e.g., fever, cough, breathing difficulty, loss of taste or smell)      Fever congestion loss of smell headache body aches fatigue  9. PREGNANCY OR POSTPARTUM: \"Is there any chance you are pregnant?\" \"When was your last menstrual period?\" \"Did you deliver in the last 2 weeks?\"      Yes is pregnant  10. HIGH RISK: \"Do you have any heart or lung problems?\" \"Do you have a weak immune system?\" (e.g., heart failure, COPD, asthma, HIV positive, chemotherapy, renal failure, diabetes mellitus, sickle cell anemia, obesity)        no  11. TRAVEL: \"Have you traveled out of the country recently?\" If Yes, ask: \"When and where?\" Also ask about out-of-state travel, since the [facility] has identified some high-risk cities for community spread in the . Note: Travel becomes less relevant if there is widespread community transmission where the patient lives.        no    Protocols used: CORONAVIRUS (COVID-19) EXPOSURE-A- 8.25.2021      " English

## 2024-05-07 ENCOUNTER — MEDICAL CORRESPONDENCE (OUTPATIENT)
Dept: HEALTH INFORMATION MANAGEMENT | Facility: HOSPITAL | Age: 39
End: 2024-05-07

## 2024-07-11 ENCOUNTER — MEDICAL CORRESPONDENCE (OUTPATIENT)
Dept: HEALTH INFORMATION MANAGEMENT | Facility: HOSPITAL | Age: 39
End: 2024-07-11

## 2024-07-13 ENCOUNTER — HEALTH MAINTENANCE LETTER (OUTPATIENT)
Age: 39
End: 2024-07-13

## 2024-09-12 ENCOUNTER — MEDICAL CORRESPONDENCE (OUTPATIENT)
Dept: HEALTH INFORMATION MANAGEMENT | Facility: HOSPITAL | Age: 39
End: 2024-09-12

## 2025-02-26 NOTE — MR AVS SNAPSHOT
After Visit Summary   3/7/2018    Viki Busch    MRN: [id number]           Patient Information     Date Of Birth          1985        Visit Information        Provider Department      3/7/2018 8:30 AM Ludy Reza MD [facility]        Today's Diagnoses     Supervision of other normal pregnancy, antepartum    -  1       Follow-ups after your visit        Your next 10 appointments already scheduled     Mar 14, 2018  8:45 AM CDT   (Arrive by 8:30 AM)   ESTABLISHED PRENATAL with Ludy Reza MD   [facility] ([facility]    [address] [place] [address]   [phone]              Who to contact     If you have questions or need follow up information about today's clinic visit or your schedule please contact [facility] directly at [phone].  Normal or non-critical lab and imaging results will be communicated to you by MyChart, letter or phone within 4 business days after the clinic has received the results. If you do not hear from us within 7 days, please contact the clinic through MyChart or phone. If you have a critical or abnormal lab result, we will notify you by phone as soon as possible.  Submit refill requests through [employer] or call your pharmacy and they will forward the refill request to us. Please allow 3 business days for your refill to be completed.          Additional Information About Your Visit        MyChart Information     [employer] gives you secure access to your electronic health record. If you see a primary care provider, you can also send messages to your care team and make appointments. If you have questions, please call your primary care clinic.  If you do not have a primary care provider, please call [phone] and they will assist you.        Care EveryWhere ID     This is your Care EveryWhere ID. This could be used by other organizations to access your [facility]  records  [id number]        Your Vitals Were     Temperature Last Period BMI (Body Mass Index)             97.1  F (36.2  C) (Tympanic) 06/06/2017 29.69 kg/m2          Blood Pressure from Last 3 Encounters:   03/07/18 110/72   02/28/18 110/68   02/21/18 110/70    Weight from Last 3 Encounters:   03/07/18 195 lb 4 oz (88.6 kg)   02/28/18 196 lb 9.6 oz (89.2 kg)   02/21/18 195 lb 12.8 oz (88.8 kg)              Today, you had the following     No orders found for display       Primary Care Provider Office Phone # Fax #    Ludy Reza [phone]       [facility] [place] [address]  [facility] [address]        Equal Access to Services     [facility] CLAIR : Hadii aad armida hadasho Soomaali, waaxda luqadaha, qaybta kaalmada adeegyada, sergo grossman . So [facility] [phone].    ATENCIÓN: Si habla español, tiene a guerrero disposición servicios gratuitos de asistencia lingüística. Llame al [phone].    We comply with applicable federal civil rights laws and [place] laws. We do not discriminate on the basis of race, color, national origin, age, disability, sex, sexual orientation, or gender identity.            Thank you!     Thank you for choosing [facility]3]  for your care. Our goal is always to provide you with excellent care. Hearing back from our patients is one way we can continue to improve our services. Please take a few minutes to complete the written survey that you may receive in the mail after your visit with us. Thank you!             Your Updated Medication List - Protect others around you: Learn how to safely use, store and throw away your medicines at www.disposemymeds.org.          This list is accurate as of 3/7/18  8:42 AM.  Always use your most recent med list.                   Brand Name Dispense Instructions for use Diagnosis    acetylcysteine 600 MG Caps capsule    N-ACETYL CYSTEINE    60 capsule    Take 1 capsule (600 mg) by mouth 2 times  daily    PCOS (polycystic ovarian syndrome)       fish oil-omega-3 fatty acids 1000 MG capsule      Take 2 g by mouth 2 times daily        PRENATAL FORMULA 27-1 MG Tabs     90 tablet    Take 1 tablet daily    Supervision of other normal pregnancy, antepartum       progesterone 100 MG capsule    PROMETRIUM    60 capsule    TAKE 1 CAPSULE BY MOUTH EVERY NIGHT AT BEDTIME AND INSERT 1 CAPSULE VAGINALLY EVERY NIGHT AT BEDTIME    Supervision of other normal pregnancy, antepartum, Low serum progesterone       vitamin D 89904 UNIT capsule    ERGOCALCIFEROL    4 capsule    Take 1 capsule (50,000 Units) by mouth every 7 days for 8 doses    Supervision of other normal pregnancy, antepartum          pt in er for complaints of weakness and body aches

## 2025-07-19 ENCOUNTER — HEALTH MAINTENANCE LETTER (OUTPATIENT)
Age: 40
End: 2025-07-19

## 2025-08-06 ENCOUNTER — ANCILLARY PROCEDURE (OUTPATIENT)
Dept: MAMMOGRAPHY | Facility: OTHER | Age: 40
End: 2025-08-06
Attending: FAMILY MEDICINE
Payer: COMMERCIAL

## 2025-08-06 DIAGNOSIS — Z12.31 VISIT FOR SCREENING MAMMOGRAM: ICD-10-CM

## 2025-08-06 PROCEDURE — 77067 SCR MAMMO BI INCL CAD: CPT | Performed by: RADIOLOGY

## 2025-08-06 PROCEDURE — 77063 BREAST TOMOSYNTHESIS BI: CPT | Performed by: RADIOLOGY
